# Patient Record
Sex: FEMALE | Race: WHITE | Employment: FULL TIME | URBAN - METROPOLITAN AREA
[De-identification: names, ages, dates, MRNs, and addresses within clinical notes are randomized per-mention and may not be internally consistent; named-entity substitution may affect disease eponyms.]

---

## 2017-07-19 PROBLEM — J30.89 NON-SEASONAL ALLERGIC RHINITIS: Status: ACTIVE | Noted: 2017-07-19

## 2017-08-08 ENCOUNTER — HOSPITAL ENCOUNTER (OUTPATIENT)
Dept: SURGERY | Age: 77
Discharge: HOME | End: 2017-08-08
Payer: COMMERCIAL

## 2017-08-08 ENCOUNTER — HOSPITAL ENCOUNTER (OUTPATIENT)
Dept: PHYSICAL THERAPY | Age: 77
Discharge: HOME | End: 2017-08-08
Payer: COMMERCIAL

## 2017-08-08 VITALS
RESPIRATION RATE: 16 BRPM | HEIGHT: 65 IN | SYSTOLIC BLOOD PRESSURE: 162 MMHG | OXYGEN SATURATION: 98 % | HEART RATE: 62 BPM | DIASTOLIC BLOOD PRESSURE: 90 MMHG | TEMPERATURE: 95.6 F | WEIGHT: 212.38 LBS | BODY MASS INDEX: 35.38 KG/M2

## 2017-08-08 LAB
ANION GAP BLD CALC-SCNC: 7 MMOL/L (ref 7–16)
APPEARANCE UR: ABNORMAL
APTT PPP: 28 SEC (ref 23.5–31.7)
ATRIAL RATE: 66 BPM
BACTERIA SPEC CULT: NORMAL
BACTERIA URNS QL MICRO: 0 /HPF
BASOPHILS # BLD AUTO: 0 K/UL (ref 0–0.2)
BASOPHILS # BLD: 1 % (ref 0–2)
BILIRUB UR QL: NEGATIVE
BUN SERPL-MCNC: 11 MG/DL (ref 8–23)
CALCIUM SERPL-MCNC: 9.2 MG/DL (ref 8.3–10.4)
CALCULATED P AXIS, ECG09: 71 DEGREES
CALCULATED R AXIS, ECG10: -12 DEGREES
CALCULATED T AXIS, ECG11: 74 DEGREES
CASTS URNS QL MICRO: 0 /LPF
CHLORIDE SERPL-SCNC: 105 MMOL/L (ref 98–107)
CO2 SERPL-SCNC: 30 MMOL/L (ref 21–32)
COLOR UR: YELLOW
CREAT SERPL-MCNC: 0.77 MG/DL (ref 0.6–1)
DIAGNOSIS, 93000: NORMAL
DIFFERENTIAL METHOD BLD: ABNORMAL
EOSINOPHIL # BLD: 0.3 K/UL (ref 0–0.8)
EOSINOPHIL NFR BLD: 5 % (ref 0.5–7.8)
EPI CELLS #/AREA URNS HPF: ABNORMAL /HPF
ERYTHROCYTE [DISTWIDTH] IN BLOOD BY AUTOMATED COUNT: 13.1 % (ref 11.9–14.6)
GLUCOSE SERPL-MCNC: 78 MG/DL (ref 65–100)
GLUCOSE UR STRIP.AUTO-MCNC: NEGATIVE MG/DL
HCT VFR BLD AUTO: 41.5 % (ref 35.8–46.3)
HGB BLD-MCNC: 13.8 G/DL (ref 11.7–15.4)
HGB UR QL STRIP: NEGATIVE
IMM GRANULOCYTES # BLD: 0 K/UL (ref 0–0.5)
IMM GRANULOCYTES NFR BLD AUTO: 0.2 % (ref 0–5)
INR PPP: 0.9 (ref 0.9–1.2)
KETONES UR QL STRIP.AUTO: NEGATIVE MG/DL
LEUKOCYTE ESTERASE UR QL STRIP.AUTO: ABNORMAL
LYMPHOCYTES # BLD AUTO: 39 % (ref 13–44)
LYMPHOCYTES # BLD: 2.1 K/UL (ref 0.5–4.6)
MCH RBC QN AUTO: 30.2 PG (ref 26.1–32.9)
MCHC RBC AUTO-ENTMCNC: 33.3 G/DL (ref 31.4–35)
MCV RBC AUTO: 90.8 FL (ref 79.6–97.8)
MONOCYTES # BLD: 0.4 K/UL (ref 0.1–1.3)
MONOCYTES NFR BLD AUTO: 8 % (ref 4–12)
NEUTS SEG # BLD: 2.6 K/UL (ref 1.7–8.2)
NEUTS SEG NFR BLD AUTO: 47 % (ref 43–78)
NITRITE UR QL STRIP.AUTO: NEGATIVE
P-R INTERVAL, ECG05: 162 MS
PH UR STRIP: 7 [PH] (ref 5–9)
PLATELET # BLD AUTO: 272 K/UL (ref 150–450)
PMV BLD AUTO: 10.2 FL (ref 10.8–14.1)
POTASSIUM SERPL-SCNC: 4.1 MMOL/L (ref 3.5–5.1)
PROT UR STRIP-MCNC: NEGATIVE MG/DL
PROTHROMBIN TIME: 9.8 SEC (ref 9.6–12)
Q-T INTERVAL, ECG07: 436 MS
QRS DURATION, ECG06: 82 MS
QTC CALCULATION (BEZET), ECG08: 457 MS
RBC # BLD AUTO: 4.57 M/UL (ref 4.05–5.25)
RBC #/AREA URNS HPF: ABNORMAL /HPF
SERVICE CMNT-IMP: NORMAL
SODIUM SERPL-SCNC: 142 MMOL/L (ref 136–145)
SP GR UR REFRACTOMETRY: 1 (ref 1–1.02)
UROBILINOGEN UR QL STRIP.AUTO: 0.2 EU/DL (ref 0.2–1)
VENTRICULAR RATE, ECG03: 66 BPM
WBC # BLD AUTO: 5.4 K/UL (ref 4.3–11.1)
WBC URNS QL MICRO: ABNORMAL /HPF

## 2017-08-08 PROCEDURE — 77030027138 HC INCENT SPIROMETER -A

## 2017-08-08 PROCEDURE — 85025 COMPLETE CBC W/AUTO DIFF WBC: CPT | Performed by: ORTHOPAEDIC SURGERY

## 2017-08-08 PROCEDURE — 85730 THROMBOPLASTIN TIME PARTIAL: CPT | Performed by: ORTHOPAEDIC SURGERY

## 2017-08-08 PROCEDURE — 80048 BASIC METABOLIC PNL TOTAL CA: CPT | Performed by: ORTHOPAEDIC SURGERY

## 2017-08-08 PROCEDURE — G8980 MOBILITY D/C STATUS: HCPCS

## 2017-08-08 PROCEDURE — G8979 MOBILITY GOAL STATUS: HCPCS

## 2017-08-08 PROCEDURE — 97161 PT EVAL LOW COMPLEX 20 MIN: CPT

## 2017-08-08 PROCEDURE — 81001 URINALYSIS AUTO W/SCOPE: CPT | Performed by: ORTHOPAEDIC SURGERY

## 2017-08-08 PROCEDURE — 93005 ELECTROCARDIOGRAM TRACING: CPT | Performed by: ANESTHESIOLOGY

## 2017-08-08 PROCEDURE — 85610 PROTHROMBIN TIME: CPT | Performed by: ORTHOPAEDIC SURGERY

## 2017-08-08 PROCEDURE — 87641 MR-STAPH DNA AMP PROBE: CPT | Performed by: ORTHOPAEDIC SURGERY

## 2017-08-08 PROCEDURE — G8978 MOBILITY CURRENT STATUS: HCPCS

## 2017-08-08 RX ORDER — TRAMADOL HYDROCHLORIDE 50 MG/1
50 TABLET ORAL
COMMUNITY
End: 2017-08-31

## 2017-08-08 NOTE — PROGRESS NOTES
Anmol Mtz Laniepos  : (78 y.o.) [address] at [address], [facility].  [phone]       Physical Therapy Prehab Plan of Treatment and Evaluation Summary:2017    ICD-10: Treatment Diagnosis:   · Pain in Right Hip (M25.551)  · Stiffness of Right Hip, Not elsewhere classified (M25.651)  Precautions/Allergies:   Codeine; Sulfa (sulfonamide antibiotics); and Zithromax [azithromycin]  MEDICAL/REFERRING DIAGNOSIS:  Unilateral primary osteoarthritis, right hip [M16.11]  REFERRING PHYSICIAN: Julianne Roldan MD  DATE OF SURGERY: 17   Assessment:   Comments:  She ios motivated and prepared for surgery. 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 Very sweet woman. She has had B TKAs in the past. She plans on going home at [place] and will have her family look after her at [place]. PROBLEM LIST (Impacting functional limitations):  Ms. Jac Wyatt presents with the following right lower extremity(s) problems:  1. Strength  2. Range of Motion  3. Home Exercise Program  4. Pain   INTERVENTIONS PLANNED:  1. Home Exercise Program  2. Educational Discussion     TREATMENT PLAN: Effective Dates: 2017 TO 2017. Frequency/Duration: Patient to continue to perform home exercise program at least twice per day up until her surgery. GOALS: (Goals have been discussed and agreed upon with patient.)  Discharge Goals: Time Frame: 1 Day  1. Patient will demonstrate independence with a home exercise program designed to increase strength, range of motion and pain control to minimize functional deficits and optimize patient for total joint replacement. Rehabilitation Potential For Stated Goals: Good  Regarding Dinorah A Ángel's therapy, I certify that the treatment plan above will be carried out by a therapist or under their direction.   Thank you for this referral,  Geraldine Gonsalves, PT               HISTORY:   Present Symptoms:  Pain Intensity 1: 8 (at its worst)  Pain Location 1: Buttocks, Hip, Groin, Knee  Pain Orientation 1: Anterior, Lateral, Medial, Posterior, Right   History of Present Injury/Illness (Reason for Referral):  Medical/Referring Diagnosis: Unilateral primary osteoarthritis, right hip [M16.11]   Past Medical History/Comorbidities:   Ms. Michell Smiley  has a past medical history of Asthma exacerbation and Bilateral arm fractures (2013). Ms. Michell Smiley  has a past surgical history that includes hysterectomy (1960's); knee replacement (Left, 2005); knee replacement (Right, 2006); and orthopaedic (2013). Social History/Living Environment:   Home Environment: Private residence  # Steps to Enter: 3  Hand Rails : Bilateral  One/Two Story Residence: Two story, live on 1st floor  # of Interior Steps: 8  Interior Rails: Left  Living Alone: Yes  Support Systems: Family member(s), Child(messi)  Patient Expects to be Discharged to[de-identified] Private residence  Current DME Used/Available at Home: Mayra Camacho, ajmee, [address] [facility], [place], straight, Shower chair, Commode, bedside  Tub or Shower Type: Tub  Work/Activity:  Works at [employer] in [address] [place] [address]. Does some sitting and walking.   Dominant Side:  RIGHT  Current Medications:  See Pre-assessment nursing note   Number of Personal Factors/Comorbidities that affect the Plan of Care: 0: LOW COMPLEXITY   EXAMINATION:   ADLs (Current Functional Status):   Ambulation:  [x] Independent  [] Walk Indoors Only  [x] Walk Outdoors  [x] Use Assistive Device  [] Use Wheelchair Only Dressing:  [x] Independent  Requires Assistance from Someone for:  [] Sock/Shoes  [] Pants  [] Everything   Bathing/Showering:   [x] Independent  [] Requires Assistance from Someone  [] Sponge Bath Only Household Activities:  [x] Routine house and yard work  [] Light Housework Only  [] None   Observation/Orthostatic Postural Assessment:    Leg length discrepancy, left (L LE 1/4\" shorter than R)  ROM/Flexibility:   AROM: Within functional limits (L LE)                       RLE AROM  R Hip Flexion: 110  R Hip ABduction: 20   Strength:   Strength: Generally decreased, functional (L LE 4/5)              RLE Strength  R Hip Flexion: 3+  R Hip ABduction: 3+  R Knee Extension: 4  R Ankle Dorsiflexion: 4   Functional Mobility:    Sensation: Intact (L LE)    Stand to Sit: Independent  Sit to Stand: Independent  Stand Pivot Transfers: Independent  Distance (ft): 1000 Feet (ft)  Ambulation - Level of Assistance: Independent  Speed/Robyn: Pace decreased (<100 feet/min)  Gait Abnormalities: Antalgic          Balance:    Sitting: Intact  Standing: Intact   Body Structures Involved:  1. Bones  2. Joints  3. Muscles Body Functions Affected:  1. Neuromusculoskeletal  2. Movement Related Activities and Participation Affected:  1. Mobility   Number of elements that affect the Plan of Care: 4+: HIGH COMPLEXITY   CLINICAL PRESENTATION:   Presentation: Stable and uncomplicated: LOW COMPLEXITY   CLINICAL DECISION MAKING:   Outcome Measure: Tool Used: Lower Extremity Functional Scale (LEFS)  Score:  Initial: 60/80 Most Recent: X/80 (Date: -- )   Interpretation of Score: 20 questions each scored on a 5 point scale with 0 representing \"extreme difficulty or unable to perform\" and 4 representing \"no difficulty\". The lower the score, the greater the functional disability. 80/80 represents no disability. Minimal detectable change is 9 points. Score 80 79-65 64-49 48-33 32-17 16-1 0   Modifier CH CI CJ CK CL CM CN     ? Mobility - Walking and Moving Around:     - CURRENT STATUS: CJ - 20%-39% impaired, limited or restricted    - GOAL STATUS: CJ - 20%-39% impaired, limited or restricted    - D/C STATUS:  CJ - 20%-39% impaired, limited or restricted    Medical Necessity:   · Ms. Neal is expected to optimize her lower extremity strength and ROM in preparation for joint replacement surgery.   Reason for Services/Other Comments:  · Achieve baseline assesment of musculoskeletal system, functional mobility and home environment. , educate in PT HEP in preparation for surgery, educate in hospital plan of care. Use of outcome tool(s) and clinical judgement create a POC that gives a: Clear prediction of patient's progress: LOW COMPLEXITY   TREATMENT:   Treatment/Session Assessment:  Patient was instructed in PT- HEP to increase strength and ROM in LEs. Answered all questions. · Post session pain:  1  · Compliance with Program/Exercises: compliant all of the time.   Total Treatment Duration:PT Patient Time In/Time Out  Time In: 3330  Time Out: [address], [place]

## 2017-08-08 NOTE — PERIOP NOTES
Patient verified name, , and surgery as listed in [facility]. Type III surgery, walk in assessment complete. Labs per surgeon: cbc,bmp,PT/PTT and UA ; results pending  Labs per anesthesia protocol: type & screen DOS  EKG:performed per anesthesia protocol, results within limits. Copy placed on chart. Pt with history of asthma, followed by [facility]. Last office visit 17. Per NP note:    Spirometry 2017moderate restrictive defect, no significant interval change. Scooping of expiratory loop consistent with obstructive pattern  She is felt to be at low risk for pulmonary complications associated with surgery. ..regarding CPAP therapy, may need in the immediate postoperative. And on a nightly basis during hospitalization. Discussed that if she reconsiders uses CPAP long-term, will likely need to have sleep study/CPAP titration. Continue Advair, as needed albuterol inhaler/nebulizer, Accolate twice daily. Hibiclens and instructions to return bottle on DOS given per hospital policy. Nasal Swab collected per MD order and instructions for Mupirocin nasal ointment if required. Patient provided with handouts including Guide to Surgery, Pain Management, Hand Hygiene, Blood Transfusion Education, and [place] [facility] Brochure. Patient answered medical/surgical history questions at their best of ability. All prior to admission medications documented in [facility]. Original medication prescription bottle  visualized during patient appointment. Patient instructed to hold all vitamins 7 days prior to surgery and NSAIDS 5 days prior to surgery. Medications to be held: none    Patient instructed to continue previous medications as prescribed prior to surgery and to take the following medications the day of surgery according to anesthesia guidelines with a small sip of water: zafirlukast and nexium.  Also instructed to use advair inhaler and to bring Advair and ProAir inhalers to hospital.      Patient teach back successful and patient demonstrates knowledge of instruction.

## 2017-08-08 NOTE — PROGRESS NOTES
08/08/17 1030   Oxygen Therapy   O2 Sat (%) 98 %   Pulse via Oximetry 87 beats per minute   O2 Device Room air   Pre-Treatment   Breath Sounds Bilateral Clear;Diminished   Pre FEV1 (liters) 1.2 liters   % Predicted 56   Incentive Spirometry Treatment   Actual Volume (ml) 1500 ml     Initial respiratory Assessment completed with pt. Pt was interviewed and evaluated in Joint camp prior to surgery. Patient ID:  Bety Sal  [id number]  36 y.o.  1940  Surgeon: Dr. Patricia Bronson  Date of Surgery: 8/29/2017  Procedure: Total Right Hip Arthroplasty  Primary Care Physician: Angela Fagan [phone]  Specialists: [facility] - LAST APPOINTMENT 7/19/2017                                 Pt instructed in the use of Incentive Spirometry. Pt instructed to bring Incentive Spirometer back on date of surgery & to start using Is upon return to pt room. Pt taught proper cough technique      History of smoking:   FORMER? 1 CIGARETTE PER WEEK FOR 6 MONTHS     Quit date:    Secondhand smoke:      Past procedures with Oxygen desaturation:NONE    Past Medical History:   Diagnosis Date    Arthritis     Asthma exacerbation     hospitalization 2004    Bilateral arm fractures 2013    GERD (gastroesophageal reflux disease)     Hiatal hernia     Morbid obesity ([address]                                     ENT:SINUS                                                                                                                      Respiratory history:ASTHMA                                    DENIES SOB                                  Respiratory meds:  ALBUTEROL NEB PRN,ADVAIR BID                                                        FAMILY PRESENT:                      NO                                        PAST SLEEP STUDY:        YES          HX OF LENNY:                        YES       POSITIVE FOR LENNY- GAVE CPAP TO HER SON.   DANGERS OF NON-COMPLIANCE EXPLAINED TO PT. PT STATES [facility] TOLD HER THE SAME. PT AGREEABLE AT THIS TIME FOR CONSULT TO SLEEP CENTER TO FOLLOW UP                                  LENNY assessment:                                                                                              PHYSICAL EXAM   Body mass index is 35.34 kg/(m^2). Visit Vitals    /90 (BP 1 Location: Left arm, BP Patient Position: Sitting)    Pulse 62    Temp 95.6 °F (35.3 °C)    Resp 16    Ht 5' 5\" (1.651 m)    Wt 96.3 kg (212 lb 6 oz)    SpO2 (P) 98%    BMI 35.34 kg/m2     Neck circumference:  40    cm    Loud snoring:YES       Witnessed apnea or wakening gasping or choking: , APNEA    Awakens with headaches:DENIES    Morning or daytime tiredness/ sleepiness: DENIES       Dry mouth or sore throat in morning:YES   Freidman stage:4    SACS score:22    STOP/BAN                              CPAP:NONE        ALBUTEROL NEB Q6 PRN          SPACER       CONT SAT HS       O2 AT 3 LPM HS     Referrals:SLEEP CENTER CONSULTATION    Pt.  Phone AVWVQY:[phone]

## 2017-08-08 NOTE — PERIOP NOTES
Lab results within limits per anesthesia protocol; OK for surgery. Recent Results (from the past 12 hour(s))   CBC WITH AUTOMATED DIFF    Collection Time: 08/08/17 10:55 AM   Result Value Ref Range    WBC 5.4 4.3 - 11.1 K/uL    RBC 4.57 4.05 - 5.25 M/uL    HGB 13.8 11.7 - 15.4 g/dL    HCT 41.5 35.8 - 46.3 %    MCV 90.8 79.6 - 97.8 FL    MCH 30.2 26.1 - 32.9 PG    MCHC 33.3 31.4 - 35.0 g/dL    RDW 13.1 11.9 - 14.6 %    PLATELET 670 892 - 972 K/uL    MPV 10.2 (L) 10.8 - 14.1 FL    DF AUTOMATED      NEUTROPHILS 47 43 - 78 %    LYMPHOCYTES 39 13 - 44 %    MONOCYTES 8 4.0 - 12.0 %    EOSINOPHILS 5 0.5 - 7.8 %    BASOPHILS 1 0.0 - 2.0 %    IMMATURE GRANULOCYTES 0.2 0.0 - 5.0 %    ABS. NEUTROPHILS 2.6 1.7 - 8.2 K/UL    ABS. LYMPHOCYTES 2.1 0.5 - 4.6 K/UL    ABS. MONOCYTES 0.4 0.1 - 1.3 K/UL    ABS. EOSINOPHILS 0.3 0.0 - 0.8 K/UL    ABS. BASOPHILS 0.0 0.0 - 0.2 K/UL    ABS. IMM.  GRANS. 0.0 0.0 - 0.5 K/UL   METABOLIC PANEL, BASIC    Collection Time: 08/08/17 10:55 AM   Result Value Ref Range    Sodium 142 136 - 145 mmol/L    Potassium 4.1 3.5 - 5.1 mmol/L    Chloride 105 98 - 107 mmol/L    CO2 30 21 - 32 mmol/L    Anion gap 7 7 - 16 mmol/L    Glucose 78 65 - 100 mg/dL    BUN 11 8 - 23 MG/DL    Creatinine 0.77 0.6 - 1.0 MG/DL    GFR est AA >60 >60 ml/min/1.73m2    GFR est non-AA >60 >60 ml/min/1.73m2    Calcium 9.2 8.3 - 10.4 MG/DL   PROTHROMBIN TIME + INR    Collection Time: 08/08/17 10:55 AM   Result Value Ref Range    Prothrombin time 9.8 9.6 - 12.0 sec    INR 0.9 0.9 - 1.2     PTT    Collection Time: 08/08/17 10:55 AM   Result Value Ref Range    aPTT 28.0 23.5 - 31.7 SEC   URINALYSIS W/ RFLX MICROSCOPIC    Collection Time: 08/08/17 10:55 AM   Result Value Ref Range    Color YELLOW      Appearance CLOUDY      Specific gravity 1.005 1.001 - 1.023      pH (UA) 7.0 5.0 - 9.0      Protein NEGATIVE  NEG mg/dL    Glucose NEGATIVE  mg/dL    Ketone NEGATIVE  NEG mg/dL    Bilirubin NEGATIVE  NEG      Blood NEGATIVE  NEG Urobilinogen 0.2 0.2 - 1.0 EU/dL    Nitrites NEGATIVE  NEG      Leukocyte Esterase SMALL (A) NEG      WBC 0-3 0 /hpf    RBC 0-3 0 /hpf    Epithelial cells 0-3 0 /hpf    Bacteria 0 0 /hpf    Casts 0 0 /lpf

## 2017-08-14 PROBLEM — Z91.14 NONCOMPLIANCE WITH CPAP TREATMENT: Status: ACTIVE | Noted: 2017-08-14

## 2017-08-14 NOTE — ADVANCED PRACTICE NURSE
Preoperative Assessment Total Joint Replacement    Patient ID:  Lakia Angeles  [id number]  69 y.o.  1940  Surgeon: Dr. Jayashree Rosas  Date of Surgery: 8/29/2017  Procedure: Total Right Hip Arthroplasty  Primary Care Physician: Christine Aquino -[phone]      SUBJECTIVE:  Lakia Angeles is a 78 y.o. WHITE [place]  female who presents for preoperative evaluation for Total Right Hip arthroplasty. Past Medical History:   Diagnosis Date    Arthritis     Asthma exacerbation     hospitalization 2004    Bilateral arm fractures 2013    GERD (gastroesophageal reflux disease)     Hiatal hernia     Morbid obesity ([address]         Past Surgical History:   Procedure Laterality Date    HX HYSTERECTOMY  1960's    HX KNEE REPLACEMENT Left 2005    HX KNEE REPLACEMENT Right 2006    HX ORTHOPAEDIC  2013    arm     Family History   Problem Relation Age of Onset    Lung Disease Neg Hx       Social History   Substance Use Topics    Smoking status: Never Smoker    Smokeless tobacco: Never Used    Alcohol use No       Prior to Admission medications    Medication Sig Start Date End Date Taking? Authorizing Provider   traMADol (ULTRAM) 50 mg tablet Take 50 mg by mouth every six (6) hours as needed for Pain. Yes Historical Provider   fluticasone-salmeterol (ADVAIR DISKUS) 250-50 mcg/dose diskus inhaler 1 inhalation bid, rinse mouth after use 7/19/17  Yes Kassie Thompson NP   albuterol (PROAIR HFA) 90 mcg/actuation inhaler 2 puffs qid prn 7/19/17  Yes Kassie Thompson NP   zafirlukast (ACCOLATE) 20 mg tablet 1 po bid 7/19/17  Yes Kassie Thompson NP   esomeprazole (NEXIUM) 20 mg capsule Take 20 mg by mouth daily.    Yes Historical Provider   albuterol (PROVENTIL VENTOLIN) 2.5 mg /3 mL (0.083 %) nebulizer solution 1 vial via nebulizer qid prn    Dx - asthma J45.909 7/19/17   Kassie Thompson NP   magic mouthwash solution 1 tsp swish and swallow tid 7/19/17   Kassie Thompson NP   OTHER Handicap Decal  Dx: Asthma J45.40 5/19/16   Fady Alvares MD     Allergies   Allergen Reactions    Codeine Unknown (comments)    Sulfa (Sulfonamide Antibiotics) Unknown (comments)    Zithromax [Azithromycin] Unknown (comments)        REVIEW OF SYSTEMS:  CONSTITUTIONAL:  Denies fever, decreased appetite, weight loss/gain or night sweats. Positive for fatigue. HEENT: Denies vision or hearing changes. CARDIAC: Denies CP, palpitations, carotid artery disease or syncopal episodes. RESPIRATORY: Denies dyspnea on exertion. Positive for obesity and snoring. Falls asleep during the day. GI: Positive GERD. Denies history of GI bleed or melena, PUD. : Denies dysuria, hematuria. Bela Eves HEMATOLOGIC: Denies anemia or blood disorders. ENDOCRINE: Denies thyroid disease. MUSCULOSKELETAL: See HPI. NEUROLOGIC: Denies seizure, peripheral neuropathy or memory loss. PSYCH: Denies depression, anxiety or  insomnia. SKIN: Denies rash or open sores. OBJECTIVE:  PHYSICAL EXAM   Body mass index is 35.34 kg/(m^2). Visit Vitals    /90 (BP 1 Location: Left arm, BP Patient Position: Sitting)    Pulse 62    Temp 95.6 °F (35.3 °C)    Resp 16    Ht 5' 5\" (1.651 m)    Wt 96.3 kg (212 lb 6 oz)    SpO2 98%    BMI 35.34 kg/m2     GENERAL  EYES: Normal Conjunctivae . NECK: Normal ROM. Trachea midline. CARDIOVASCULAR: Regular rate and rhythm. No murmur or gallops. No Lower extremity edema. LUNGS: CTA bilaterally. No wheezes, rhonchi or rales. GI: Abdomen nontender. NEUROLOGIC: Alert and oriented x 3. SKIN: No rash, bruising, swelling, redness or warmth.    Labs:     Reviewed    Problem List:  Patient Active Problem List   Diagnosis Code    Asthma J45.909    LENNY (obstructive sleep apnea) G47.33    Obesity E66.9    Allergic rhinitis J30.9    GERD (gastroesophageal reflux disease) K21.9    Moderate persistent asthma without complication T24.15    Non-seasonal allergic rhinitis J30.89    Noncompliance with CPAP treatment Z91.14     Discussed the importance of compliance with CPAP. We will refer her  to the sleep lab for consultation. Total Joint Surgery Pre-Assessment Recommendations: The patient is not compliant with wearing CPAP. Recommend oxygen saturation monitoring during hospitalization and oxygen at 3 liter via nc qhs.       Signed By: SANGEETA Rosenthal    August 14, 2017

## 2017-08-25 NOTE — H&P
H&P    Patient ID:  Alma Granados  [id number]  77 y.o.  1940  Surgeon:  Connie Gambino MD  Date of Surgery: * No surgery date entered *  Procedure: Right Hip Total Arthroplasty  Primary Care Physician: Cassie Ferrari MD        Subjective:  Alma Granados is a 78 y.o. WHITE OR  female who presents with Right Hip pain. She has history of Right Hip pain for several months ago. Symptoms worse with walking and relieved with rest. Conservative treatment consisting of  activity modification has not helped. The patient  lives with their spouse. The patients goal after surgery is improved pain and function. Past Medical History:   Diagnosis Date    Arthritis     Asthma exacerbation     hospitalization 2004    Bilateral arm fractures 2013    GERD (gastroesophageal reflux disease)     Hiatal hernia     Morbid obesity ([address]       Past Surgical History:   Procedure Laterality Date    HX HYSTERECTOMY  1960's    HX KNEE REPLACEMENT Left 2005    HX KNEE REPLACEMENT Right 2006    HX ORTHOPAEDIC  2013    arm     Family History   Problem Relation Age of Onset    Lung Disease Neg Hx       Social History   Substance Use Topics    Smoking status: Never Smoker    Smokeless tobacco: Never Used    Alcohol use No       Prior to Admission medications    Medication Sig Start Date End Date Taking? Authorizing Provider   traMADol (ULTRAM) 50 mg tablet Take 50 mg by mouth every six (6) hours as needed for Pain.     Historical Provider   albuterol (PROVENTIL VENTOLIN) 2.5 mg /3 mL (0.083 %) nebulizer solution 1 vial via nebulizer qid prn    Dx - asthma J45.909 7/19/17   Vinnie García NP   magic mouthwash solution 1 tsp swish and swallow tid 7/19/17   Vinnie García NP   fluticasone-salmeterol (ADVAIR DISKUS) 250-50 mcg/dose diskus inhaler 1 inhalation bid, rinse mouth after use 7/19/17   Vinnie García NP   albuterol (PROAIR HFA) 90 mcg/actuation inhaler 2 puffs qid prn 7/19/17   Riddhi Spoon Mino Tijerina NP   zafirlukast (ACCOLATE) 20 mg tablet 1 po bid 7/19/17   Ady Garcia NP   OTHER Handicap Decal  Dx: Asthma J45.40 5/19/16   Maria Isabel Melendez MD   esomeprazole (NEXIUM) 20 mg capsule Take 20 mg by mouth daily. Historical Provider     Allergies   Allergen Reactions    Codeine Unknown (comments)    Sulfa (Sulfonamide Antibiotics) Unknown (comments)    Zithromax [Azithromycin] Unknown (comments)        REVIEW OF SYSTEMS:  CONSTITUTIONAL: Denies fever, decreased appetite, weight loss/gain, night sweats or fatigue. HEENT: Denies vision or hearing changes. denies glasses. denies hearing aids. CARDIAC: Denies CP, palpitations, rheumatic fever, murmur, peripheral edema, carotid artery disease or syncopal episodes. RESPIRATORY: Denies dyspnea on exertion, asthma, COPD or orthopnea. GI: Denies GERD, history of GI bleed or melena, PUD, hepatitis or cirrhosis. : Denies dysuria, hematuria. denies BPH symptoms. HEMATOLOGIC: Denies anemia or blood disorders. ENDOCRINE: Denies thyroid disease. MUSCULOSKELETAL: See HPI. NEUROLOGIC: Denies seizure, peripheral neuropathy or memory loss. PSYCH: Denies depression, anxiety or insomnia. SKIN: Denies rash or open sores. Objective:    PHYSICAL EXAM  GENERAL: No data found. EYES: PERRL. EOM intact. MOUTH:Teeth and Gums normal. NECK: Full ROM. Trachea midline. No thyromegaly or JVD. CARDIOVASCULAR: Regular rate and rhythm. No murmur or gallops. No carotid bruits. Peripheral pulses: radial 2 +, PT 2+, DP 2+ bilaterally. LUNGS: CTA bilaterally. No wheezes, rhonchi or rales. GI: positive BS. Abdomen nontender. NEUROLOGIC: Alert and oriented x 3. Bilateral equal strong had grasp and bilateral equal strong plantar flexion and dorsiflexion. GAIT: abnormal  MUSCULOSKELETAL: ROM: full range of motion. Tenderness: None. Crepitus: not present. SKIN: No rash, bruising, swelling, redness or warmth.      Labs:  No results found for this or any previous visit (from the past 24 hour(s)). Xray Right Hip: Joint space narrowing    Assessment:  Advanced Right Hip Osteoarthritis. Total Right Hip Arthroplasty Indicated. Patient Active Problem List   Diagnosis Code    Asthma J45.909    LENNY (obstructive sleep apnea) G47.33    Obesity E66.9    Allergic rhinitis J30.9    GERD (gastroesophageal reflux disease) K21.9    Moderate persistent asthma without complication K27.03    Non-seasonal allergic rhinitis J30.89    Noncompliance with CPAP treatment Z91.14       Plan:  I have advised the patient of the risks and consequences, including possible complications of performing total joint replacement, as well as not doing this operation. The patient had the opportunity to ask questions and have them answered to their satisfaction.      Signed:  YOHANNES More 8/25/2017

## 2017-08-28 ENCOUNTER — ANESTHESIA EVENT (OUTPATIENT)
Dept: SURGERY | Age: 77
DRG: 470 | End: 2017-08-28
Payer: COMMERCIAL

## 2017-08-29 ENCOUNTER — HOME HEALTH ADMISSION (OUTPATIENT)
Dept: HOME HEALTH SERVICES | Facility: HOME HEALTH | Age: 77
End: 2017-08-29
Payer: COMMERCIAL

## 2017-08-29 ENCOUNTER — ANESTHESIA (OUTPATIENT)
Dept: SURGERY | Age: 77
DRG: 470 | End: 2017-08-29
Payer: COMMERCIAL

## 2017-08-29 ENCOUNTER — HOSPITAL ENCOUNTER (INPATIENT)
Age: 77
LOS: 2 days | Discharge: HOME HEALTH | DRG: 470 | End: 2017-08-31
Attending: ORTHOPAEDIC SURGERY | Admitting: ORTHOPAEDIC SURGERY
Payer: COMMERCIAL

## 2017-08-29 ENCOUNTER — APPOINTMENT (OUTPATIENT)
Dept: GENERAL RADIOLOGY | Age: 77
DRG: 470 | End: 2017-08-29
Attending: ORTHOPAEDIC SURGERY
Payer: COMMERCIAL

## 2017-08-29 PROBLEM — M19.90 OSTEOARTHRITIS: Status: ACTIVE | Noted: 2017-08-29

## 2017-08-29 LAB
ABO + RH BLD: NORMAL
BLOOD GROUP ANTIBODIES SERPL: NORMAL
GLUCOSE BLD STRIP.AUTO-MCNC: 96 MG/DL (ref 65–100)
HGB BLD-MCNC: 13.2 G/DL (ref 11.7–15.4)
SPECIMEN EXP DATE BLD: NORMAL

## 2017-08-29 PROCEDURE — 72170 X-RAY EXAM OF PELVIS: CPT

## 2017-08-29 PROCEDURE — 77030011640 HC PAD GRND REM COVD -A: Performed by: ORTHOPAEDIC SURGERY

## 2017-08-29 PROCEDURE — 77030007880 HC KT SPN EPDRL BBMI -B: Performed by: ANESTHESIOLOGY

## 2017-08-29 PROCEDURE — 77030018547 HC SUT ETHBND1 J&J -B: Performed by: ORTHOPAEDIC SURGERY

## 2017-08-29 PROCEDURE — 77030018836 HC SOL IRR NACL ICUM -A: Performed by: ORTHOPAEDIC SURGERY

## 2017-08-29 PROCEDURE — 77030020788: Performed by: ORTHOPAEDIC SURGERY

## 2017-08-29 PROCEDURE — 94762 N-INVAS EAR/PLS OXIMTRY CONT: CPT

## 2017-08-29 PROCEDURE — 77010033678 HC OXYGEN DAILY

## 2017-08-29 PROCEDURE — 74011250636 HC RX REV CODE- 250/636: Performed by: ANESTHESIOLOGY

## 2017-08-29 PROCEDURE — 74011250637 HC RX REV CODE- 250/637: Performed by: ANESTHESIOLOGY

## 2017-08-29 PROCEDURE — 77030032490 HC SLV COMPR SCD KNE COVD -B

## 2017-08-29 PROCEDURE — 82962 GLUCOSE BLOOD TEST: CPT

## 2017-08-29 PROCEDURE — 85018 HEMOGLOBIN: CPT | Performed by: ORTHOPAEDIC SURGERY

## 2017-08-29 PROCEDURE — 74011250636 HC RX REV CODE- 250/636

## 2017-08-29 PROCEDURE — C1776 JOINT DEVICE (IMPLANTABLE): HCPCS | Performed by: ORTHOPAEDIC SURGERY

## 2017-08-29 PROCEDURE — 74011000250 HC RX REV CODE- 250: Performed by: ANESTHESIOLOGY

## 2017-08-29 PROCEDURE — 74011250636 HC RX REV CODE- 250/636: Performed by: ORTHOPAEDIC SURGERY

## 2017-08-29 PROCEDURE — 74011000250 HC RX REV CODE- 250: Performed by: ORTHOPAEDIC SURGERY

## 2017-08-29 PROCEDURE — 77030002966 HC SUT PDS J&J -A: Performed by: ORTHOPAEDIC SURGERY

## 2017-08-29 PROCEDURE — 77030012890

## 2017-08-29 PROCEDURE — 77030003665 HC NDL SPN BBMI -A: Performed by: ANESTHESIOLOGY

## 2017-08-29 PROCEDURE — 65270000029 HC RM PRIVATE

## 2017-08-29 PROCEDURE — 77030006777 HC BLD SAW OSC CNMD -B: Performed by: ORTHOPAEDIC SURGERY

## 2017-08-29 PROCEDURE — 77030003666 HC NDL SPINAL BD -A: Performed by: ORTHOPAEDIC SURGERY

## 2017-08-29 PROCEDURE — 77030035236 HC SUT PDS STRATFX BARB J&J -B: Performed by: ORTHOPAEDIC SURGERY

## 2017-08-29 PROCEDURE — 94760 N-INVAS EAR/PLS OXIMETRY 1: CPT

## 2017-08-29 PROCEDURE — 74011250637 HC RX REV CODE- 250/637: Performed by: ORTHOPAEDIC SURGERY

## 2017-08-29 PROCEDURE — 0SR901A REPLACEMENT OF RIGHT HIP JOINT WITH METAL SYNTHETIC SUBSTITUTE, UNCEMENTED, OPEN APPROACH: ICD-10-PCS | Performed by: ORTHOPAEDIC SURGERY

## 2017-08-29 PROCEDURE — 77030013727 HC IRR FAN PULSVC ZIMM -B: Performed by: ORTHOPAEDIC SURGERY

## 2017-08-29 PROCEDURE — 74011000250 HC RX REV CODE- 250

## 2017-08-29 PROCEDURE — 76010000162 HC OR TIME 1.5 TO 2 HR INTENSV-TIER 1: Performed by: ORTHOPAEDIC SURGERY

## 2017-08-29 PROCEDURE — 74011000258 HC RX REV CODE- 258: Performed by: ORTHOPAEDIC SURGERY

## 2017-08-29 PROCEDURE — 77030018074 HC RTVR SUT ARTH4 S&N -B: Performed by: ORTHOPAEDIC SURGERY

## 2017-08-29 PROCEDURE — 77030018846 HC SOL IRR STRL H20 ICUM -A: Performed by: ORTHOPAEDIC SURGERY

## 2017-08-29 PROCEDURE — 77030020782 HC GWN BAIR PAWS FLX 3M -B: Performed by: ANESTHESIOLOGY

## 2017-08-29 PROCEDURE — 77030031139 HC SUT VCRL2 J&J -A: Performed by: ORTHOPAEDIC SURGERY

## 2017-08-29 PROCEDURE — 77030034849: Performed by: ORTHOPAEDIC SURGERY

## 2017-08-29 PROCEDURE — 77030008467 HC STPLR SKN COVD -B: Performed by: ORTHOPAEDIC SURGERY

## 2017-08-29 PROCEDURE — 76210000017 HC OR PH I REC 1.5 TO 2 HR: Performed by: ORTHOPAEDIC SURGERY

## 2017-08-29 PROCEDURE — 86900 BLOOD TYPING SEROLOGIC ABO: CPT | Performed by: ORTHOPAEDIC SURGERY

## 2017-08-29 PROCEDURE — 36415 COLL VENOUS BLD VENIPUNCTURE: CPT | Performed by: ORTHOPAEDIC SURGERY

## 2017-08-29 PROCEDURE — 77030012935 HC DRSG AQUACEL BMS -B: Performed by: ORTHOPAEDIC SURGERY

## 2017-08-29 PROCEDURE — 76060000034 HC ANESTHESIA 1.5 TO 2 HR: Performed by: ORTHOPAEDIC SURGERY

## 2017-08-29 DEVICE — STEM FEMORAL SUMMIT: Type: IMPLANTABLE DEVICE | Site: HIP | Status: FUNCTIONAL

## 2017-08-29 DEVICE — SHELL ACET DIA54MM HIP TI GRIPTION PRI SLD LOK RNG WIHOUT H: Type: IMPLANTABLE DEVICE | Site: HIP | Status: FUNCTIONAL

## 2017-08-29 DEVICE — LINER ACET OD54MM ID36MM HIP ALTRX PINN: Type: IMPLANTABLE DEVICE | Site: HIP | Status: FUNCTIONAL

## 2017-08-29 DEVICE — ELIMINATOR H APEX FOR 48-60MM PINN HIP SHELL: Type: IMPLANTABLE DEVICE | Site: HIP | Status: FUNCTIONAL

## 2017-08-29 DEVICE — HEAD FEM DIA36MM +5MM OFFSET 12/14 TAPR HIP CERAMIC BIOLOX: Type: IMPLANTABLE DEVICE | Site: HIP | Status: FUNCTIONAL

## 2017-08-29 RX ORDER — ZOLPIDEM TARTRATE 5 MG/1
5 TABLET ORAL
Status: DISCONTINUED | OUTPATIENT
Start: 2017-08-29 | End: 2017-08-31 | Stop reason: HOSPADM

## 2017-08-29 RX ORDER — ALBUTEROL SULFATE 0.83 MG/ML
2.5 SOLUTION RESPIRATORY (INHALATION)
Status: DISCONTINUED | OUTPATIENT
Start: 2017-08-29 | End: 2017-08-29 | Stop reason: SDUPTHER

## 2017-08-29 RX ORDER — SODIUM CHLORIDE, SODIUM LACTATE, POTASSIUM CHLORIDE, CALCIUM CHLORIDE 600; 310; 30; 20 MG/100ML; MG/100ML; MG/100ML; MG/100ML
75 INJECTION, SOLUTION INTRAVENOUS CONTINUOUS
Status: DISCONTINUED | OUTPATIENT
Start: 2017-08-29 | End: 2017-08-29 | Stop reason: HOSPADM

## 2017-08-29 RX ORDER — DEXAMETHASONE SODIUM PHOSPHATE 4 MG/ML
INJECTION, SOLUTION INTRA-ARTICULAR; INTRALESIONAL; INTRAMUSCULAR; INTRAVENOUS; SOFT TISSUE AS NEEDED
Status: DISCONTINUED | OUTPATIENT
Start: 2017-08-29 | End: 2017-08-29 | Stop reason: HOSPADM

## 2017-08-29 RX ORDER — SODIUM CHLORIDE 0.9 % (FLUSH) 0.9 %
5-10 SYRINGE (ML) INJECTION AS NEEDED
Status: DISCONTINUED | OUTPATIENT
Start: 2017-08-29 | End: 2017-08-31 | Stop reason: HOSPADM

## 2017-08-29 RX ORDER — DEXAMETHASONE SODIUM PHOSPHATE 100 MG/10ML
10 INJECTION INTRAMUSCULAR; INTRAVENOUS ONCE
Status: ACTIVE | OUTPATIENT
Start: 2017-08-30 | End: 2017-08-31

## 2017-08-29 RX ORDER — ACETAMINOPHEN 10 MG/ML
1000 INJECTION, SOLUTION INTRAVENOUS ONCE
Status: DISCONTINUED | OUTPATIENT
Start: 2017-08-29 | End: 2017-08-29 | Stop reason: ALTCHOICE

## 2017-08-29 RX ORDER — MIDAZOLAM HYDROCHLORIDE 1 MG/ML
2 INJECTION, SOLUTION INTRAMUSCULAR; INTRAVENOUS ONCE
Status: DISCONTINUED | OUTPATIENT
Start: 2017-08-29 | End: 2017-08-29 | Stop reason: HOSPADM

## 2017-08-29 RX ORDER — HYDRALAZINE HYDROCHLORIDE 20 MG/ML
10 INJECTION INTRAMUSCULAR; INTRAVENOUS
Status: DISCONTINUED | OUTPATIENT
Start: 2017-08-29 | End: 2017-08-31 | Stop reason: HOSPADM

## 2017-08-29 RX ORDER — FAMOTIDINE 20 MG/1
20 TABLET, FILM COATED ORAL ONCE
Status: COMPLETED | OUTPATIENT
Start: 2017-08-29 | End: 2017-08-29

## 2017-08-29 RX ORDER — FENTANYL CITRATE 50 UG/ML
100 INJECTION, SOLUTION INTRAMUSCULAR; INTRAVENOUS ONCE
Status: DISCONTINUED | OUTPATIENT
Start: 2017-08-29 | End: 2017-08-29 | Stop reason: HOSPADM

## 2017-08-29 RX ORDER — OXYCODONE AND ACETAMINOPHEN 5; 325 MG/1; MG/1
1 TABLET ORAL AS NEEDED
Status: DISCONTINUED | OUTPATIENT
Start: 2017-08-29 | End: 2017-08-29 | Stop reason: HOSPADM

## 2017-08-29 RX ORDER — PROMETHAZINE HYDROCHLORIDE 25 MG/1
25 TABLET ORAL
Status: DISCONTINUED | OUTPATIENT
Start: 2017-08-29 | End: 2017-08-31 | Stop reason: HOSPADM

## 2017-08-29 RX ORDER — MIDAZOLAM HYDROCHLORIDE 1 MG/ML
INJECTION, SOLUTION INTRAMUSCULAR; INTRAVENOUS AS NEEDED
Status: DISCONTINUED | OUTPATIENT
Start: 2017-08-29 | End: 2017-08-29 | Stop reason: HOSPADM

## 2017-08-29 RX ORDER — DIPHENHYDRAMINE HYDROCHLORIDE 50 MG/ML
INJECTION, SOLUTION INTRAMUSCULAR; INTRAVENOUS AS NEEDED
Status: DISCONTINUED | OUTPATIENT
Start: 2017-08-29 | End: 2017-08-29 | Stop reason: HOSPADM

## 2017-08-29 RX ORDER — SODIUM CHLORIDE 9 MG/ML
100 INJECTION, SOLUTION INTRAVENOUS CONTINUOUS
Status: DISPENSED | OUTPATIENT
Start: 2017-08-29 | End: 2017-08-31

## 2017-08-29 RX ORDER — HYDROMORPHONE HYDROCHLORIDE 1 MG/ML
1 INJECTION, SOLUTION INTRAMUSCULAR; INTRAVENOUS; SUBCUTANEOUS
Status: DISCONTINUED | OUTPATIENT
Start: 2017-08-29 | End: 2017-08-31 | Stop reason: HOSPADM

## 2017-08-29 RX ORDER — CEFAZOLIN SODIUM IN 0.9 % NACL 2 G/50 ML
2 INTRAVENOUS SOLUTION, PIGGYBACK (ML) INTRAVENOUS ONCE
Status: COMPLETED | OUTPATIENT
Start: 2017-08-29 | End: 2017-08-29

## 2017-08-29 RX ORDER — SODIUM CHLORIDE 0.9 % (FLUSH) 0.9 %
5-10 SYRINGE (ML) INJECTION EVERY 8 HOURS
Status: DISCONTINUED | OUTPATIENT
Start: 2017-08-29 | End: 2017-08-31 | Stop reason: HOSPADM

## 2017-08-29 RX ORDER — NALOXONE HYDROCHLORIDE 0.4 MG/ML
0.2 INJECTION, SOLUTION INTRAMUSCULAR; INTRAVENOUS; SUBCUTANEOUS AS NEEDED
Status: DISCONTINUED | OUTPATIENT
Start: 2017-08-29 | End: 2017-08-29 | Stop reason: HOSPADM

## 2017-08-29 RX ORDER — ONDANSETRON 8 MG/1
8 TABLET, ORALLY DISINTEGRATING ORAL
Status: DISCONTINUED | OUTPATIENT
Start: 2017-08-29 | End: 2017-08-31 | Stop reason: HOSPADM

## 2017-08-29 RX ORDER — ALBUTEROL SULFATE 0.83 MG/ML
2.5 SOLUTION RESPIRATORY (INHALATION)
Status: DISCONTINUED | OUTPATIENT
Start: 2017-08-29 | End: 2017-08-31 | Stop reason: HOSPADM

## 2017-08-29 RX ORDER — SODIUM CHLORIDE 0.9 % (FLUSH) 0.9 %
5-10 SYRINGE (ML) INJECTION AS NEEDED
Status: DISCONTINUED | OUTPATIENT
Start: 2017-08-29 | End: 2017-08-29 | Stop reason: HOSPADM

## 2017-08-29 RX ORDER — LIDOCAINE HYDROCHLORIDE 10 MG/ML
0.1 INJECTION INFILTRATION; PERINEURAL AS NEEDED
Status: DISCONTINUED | OUTPATIENT
Start: 2017-08-29 | End: 2017-08-29 | Stop reason: HOSPADM

## 2017-08-29 RX ORDER — ASPIRIN 81 MG/1
81 TABLET ORAL EVERY 12 HOURS
Status: DISCONTINUED | OUTPATIENT
Start: 2017-08-29 | End: 2017-08-31 | Stop reason: HOSPADM

## 2017-08-29 RX ORDER — ONDANSETRON 2 MG/ML
INJECTION INTRAMUSCULAR; INTRAVENOUS AS NEEDED
Status: DISCONTINUED | OUTPATIENT
Start: 2017-08-29 | End: 2017-08-29 | Stop reason: HOSPADM

## 2017-08-29 RX ORDER — HYDROMORPHONE HYDROCHLORIDE 2 MG/ML
0.5 INJECTION, SOLUTION INTRAMUSCULAR; INTRAVENOUS; SUBCUTANEOUS
Status: DISCONTINUED | OUTPATIENT
Start: 2017-08-29 | End: 2017-08-29 | Stop reason: HOSPADM

## 2017-08-29 RX ORDER — ALBUTEROL SULFATE 90 UG/1
2 AEROSOL, METERED RESPIRATORY (INHALATION)
Status: DISCONTINUED | OUTPATIENT
Start: 2017-08-29 | End: 2017-08-31 | Stop reason: HOSPADM

## 2017-08-29 RX ORDER — CELECOXIB 200 MG/1
200 CAPSULE ORAL EVERY 12 HOURS
Status: DISCONTINUED | OUTPATIENT
Start: 2017-08-29 | End: 2017-08-31 | Stop reason: HOSPADM

## 2017-08-29 RX ORDER — CEFAZOLIN SODIUM IN 0.9 % NACL 2 G/50 ML
2 INTRAVENOUS SOLUTION, PIGGYBACK (ML) INTRAVENOUS EVERY 8 HOURS
Status: COMPLETED | OUTPATIENT
Start: 2017-08-29 | End: 2017-08-30

## 2017-08-29 RX ORDER — ZAFIRLUKAST 20 MG/1
20 TABLET, FILM COATED ORAL
Status: DISCONTINUED | OUTPATIENT
Start: 2017-08-29 | End: 2017-08-31 | Stop reason: HOSPADM

## 2017-08-29 RX ORDER — ROPIVACAINE HYDROCHLORIDE 2 MG/ML
INJECTION, SOLUTION EPIDURAL; INFILTRATION; PERINEURAL AS NEEDED
Status: DISCONTINUED | OUTPATIENT
Start: 2017-08-29 | End: 2017-08-29 | Stop reason: HOSPADM

## 2017-08-29 RX ORDER — PROPOFOL 10 MG/ML
INJECTION, EMULSION INTRAVENOUS
Status: DISCONTINUED | OUTPATIENT
Start: 2017-08-29 | End: 2017-08-29 | Stop reason: HOSPADM

## 2017-08-29 RX ORDER — DIPHENHYDRAMINE HCL 25 MG
25 CAPSULE ORAL
Status: DISCONTINUED | OUTPATIENT
Start: 2017-08-29 | End: 2017-08-31 | Stop reason: HOSPADM

## 2017-08-29 RX ORDER — ACETAMINOPHEN 500 MG
1000 TABLET ORAL EVERY 6 HOURS
Status: DISCONTINUED | OUTPATIENT
Start: 2017-08-30 | End: 2017-08-29 | Stop reason: ALTCHOICE

## 2017-08-29 RX ORDER — MORPHINE SULFATE 10 MG/ML
INJECTION, SOLUTION INTRAMUSCULAR; INTRAVENOUS AS NEEDED
Status: DISCONTINUED | OUTPATIENT
Start: 2017-08-29 | End: 2017-08-29 | Stop reason: HOSPADM

## 2017-08-29 RX ORDER — TRANEXAMIC ACID 100 MG/ML
INJECTION, SOLUTION INTRAVENOUS AS NEEDED
Status: DISCONTINUED | OUTPATIENT
Start: 2017-08-29 | End: 2017-08-29 | Stop reason: HOSPADM

## 2017-08-29 RX ORDER — PANTOPRAZOLE SODIUM 40 MG/1
40 TABLET, DELAYED RELEASE ORAL
Status: DISCONTINUED | OUTPATIENT
Start: 2017-08-30 | End: 2017-08-31 | Stop reason: HOSPADM

## 2017-08-29 RX ORDER — FLUTICASONE PROPIONATE AND SALMETEROL 250; 50 UG/1; UG/1
1 POWDER RESPIRATORY (INHALATION)
Status: DISCONTINUED | OUTPATIENT
Start: 2017-08-29 | End: 2017-08-31 | Stop reason: HOSPADM

## 2017-08-29 RX ORDER — FENTANYL CITRATE 50 UG/ML
INJECTION, SOLUTION INTRAMUSCULAR; INTRAVENOUS AS NEEDED
Status: DISCONTINUED | OUTPATIENT
Start: 2017-08-29 | End: 2017-08-29 | Stop reason: HOSPADM

## 2017-08-29 RX ORDER — AMOXICILLIN 250 MG
2 CAPSULE ORAL DAILY
Status: DISCONTINUED | OUTPATIENT
Start: 2017-08-30 | End: 2017-08-31 | Stop reason: HOSPADM

## 2017-08-29 RX ORDER — HYDROCODONE BITARTRATE AND ACETAMINOPHEN 7.5; 325 MG/1; MG/1
1 TABLET ORAL
Status: DISCONTINUED | OUTPATIENT
Start: 2017-08-29 | End: 2017-08-31 | Stop reason: HOSPADM

## 2017-08-29 RX ORDER — HYDROCODONE BITARTRATE AND ACETAMINOPHEN 7.5; 325 MG/1; MG/1
2 TABLET ORAL
Status: DISCONTINUED | OUTPATIENT
Start: 2017-08-29 | End: 2017-08-31 | Stop reason: HOSPADM

## 2017-08-29 RX ORDER — NALOXONE HYDROCHLORIDE 0.4 MG/ML
.2-.4 INJECTION, SOLUTION INTRAMUSCULAR; INTRAVENOUS; SUBCUTANEOUS
Status: DISCONTINUED | OUTPATIENT
Start: 2017-08-29 | End: 2017-08-31 | Stop reason: HOSPADM

## 2017-08-29 RX ADMIN — CEFAZOLIN 2 G: 1 INJECTION, POWDER, FOR SOLUTION INTRAMUSCULAR; INTRAVENOUS; PARENTERAL at 11:24

## 2017-08-29 RX ADMIN — FENTANYL CITRATE 25 MCG: 50 INJECTION, SOLUTION INTRAMUSCULAR; INTRAVENOUS at 11:51

## 2017-08-29 RX ADMIN — SODIUM CHLORIDE, SODIUM LACTATE, POTASSIUM CHLORIDE, AND CALCIUM CHLORIDE: 600; 310; 30; 20 INJECTION, SOLUTION INTRAVENOUS at 11:24

## 2017-08-29 RX ADMIN — ONDANSETRON 4 MG: 2 INJECTION INTRAMUSCULAR; INTRAVENOUS at 11:45

## 2017-08-29 RX ADMIN — ASPIRIN 81 MG: 81 TABLET, COATED ORAL at 20:07

## 2017-08-29 RX ADMIN — HYDROMORPHONE HYDROCHLORIDE 0.5 MG: 2 INJECTION, SOLUTION INTRAMUSCULAR; INTRAVENOUS; SUBCUTANEOUS at 14:32

## 2017-08-29 RX ADMIN — CELECOXIB 200 MG: 200 CAPSULE ORAL at 20:07

## 2017-08-29 RX ADMIN — FENTANYL CITRATE 25 MCG: 50 INJECTION, SOLUTION INTRAMUSCULAR; INTRAVENOUS at 11:53

## 2017-08-29 RX ADMIN — FAMOTIDINE 20 MG: 20 TABLET ORAL at 09:39

## 2017-08-29 RX ADMIN — MIDAZOLAM HYDROCHLORIDE 2 MG: 1 INJECTION, SOLUTION INTRAMUSCULAR; INTRAVENOUS at 11:24

## 2017-08-29 RX ADMIN — SODIUM CHLORIDE, SODIUM LACTATE, POTASSIUM CHLORIDE, AND CALCIUM CHLORIDE: 600; 310; 30; 20 INJECTION, SOLUTION INTRAVENOUS at 11:58

## 2017-08-29 RX ADMIN — FENTANYL CITRATE 50 MCG: 50 INJECTION, SOLUTION INTRAMUSCULAR; INTRAVENOUS at 11:24

## 2017-08-29 RX ADMIN — DIPHENHYDRAMINE HYDROCHLORIDE 12.5 MG: 50 INJECTION, SOLUTION INTRAMUSCULAR; INTRAVENOUS at 12:23

## 2017-08-29 RX ADMIN — PROPOFOL 50 MCG/KG/MIN: 10 INJECTION, EMULSION INTRAVENOUS at 11:55

## 2017-08-29 RX ADMIN — MIDAZOLAM HYDROCHLORIDE 2 MG: 1 INJECTION, SOLUTION INTRAMUSCULAR; INTRAVENOUS at 12:23

## 2017-08-29 RX ADMIN — CEFAZOLIN 2 G: 1 INJECTION, POWDER, FOR SOLUTION INTRAMUSCULAR; INTRAVENOUS; PARENTERAL at 18:28

## 2017-08-29 RX ADMIN — HYDROCODONE BITARTRATE AND ACETAMINOPHEN 1 TABLET: 7.5; 325 TABLET ORAL at 20:07

## 2017-08-29 RX ADMIN — DEXAMETHASONE SODIUM PHOSPHATE 10 MG: 4 INJECTION, SOLUTION INTRA-ARTICULAR; INTRALESIONAL; INTRAMUSCULAR; INTRAVENOUS; SOFT TISSUE at 11:45

## 2017-08-29 RX ADMIN — HYDROMORPHONE HYDROCHLORIDE 0.5 MG: 2 INJECTION, SOLUTION INTRAMUSCULAR; INTRAVENOUS; SUBCUTANEOUS at 14:37

## 2017-08-29 RX ADMIN — TRANEXAMIC ACID 1000 MG: 100 INJECTION, SOLUTION INTRAVENOUS at 11:44

## 2017-08-29 RX ADMIN — FLUTICASONE PROPIONATE AND SALMETEROL 1 PUFF: 250; 50 POWDER RESPIRATORY (INHALATION) at 20:07

## 2017-08-29 RX ADMIN — LIDOCAINE HYDROCHLORIDE 0.1 ML: 10 INJECTION, SOLUTION INFILTRATION; PERINEURAL at 09:32

## 2017-08-29 NOTE — ANESTHESIA PROCEDURE NOTES
Spinal Block    Start time: 8/29/2017 11:32 AM  End time: 8/29/2017 11:38 AM  Performed by: Viri Dailey  Authorized by: Viri Dailey     Pre-procedure:   Indications: primary anesthetic  Preanesthetic Checklist: patient identified, risks and benefits discussed, anesthesia consent, site marked, patient being monitored and timeout performed    Timeout Time: 11:32          Spinal Block:   Patient Position:  Seated  Prep Region:  Lumbar  Prep: chlorhexidine      Location:  L3-4  Technique:  Single shot  Local:  Lidocaine 1% (1 mL)  Local Dose (mL):  1    Needle:   Needle Type:  Pencan  Needle Gauge:  25 G  Attempts:  1      Events: CSF confirmed, no blood with aspiration and no paresthesia        Assessment:  Insertion:  Uncomplicated  Patient tolerance:  Patient tolerated the procedure well with no immediate complications  Anesthetic medications:  Marcaine: 12 mg in 8.25% Dextrose

## 2017-08-29 NOTE — PERIOP NOTES
TRANSFER - OUT REPORT:    Verbal report given to receiving nurse Bernie(name) on [address]  being transferred to [facility](unit) for routine progression of care       Report consisted of patients Situation, Background, Assessment and   Recommendations(SBAR). Information from the following report(s) OR Summary, Procedure Summary, Intake/Output and MAR was reviewed with the receiving nurse. Opportunity for questions and clarification was provided.       Patient transported with:   O2 @ 1 liters  Tech

## 2017-08-29 NOTE — IP AVS SNAPSHOT
Aba Fernández 
 
 
 [address] 
[phone] Patient: Evette Villa MRN: [id number] DMP:15/24/3933 You are allergic to the following Allergen Reactions Codeine Unknown (comments) Sulfa (Sulfonamide Antibiotics) Unknown (comments) Zithromax (Azithromycin) Unknown (comments) Recent Documentation Height Weight Breastfeeding? BMI OB Status Smoking Status 1.651 m 96.2 kg No 35.28 kg/m2 Hysterectomy Never Smoker Emergency Contacts Name Discharge Info Relation Home Work Mobile Lashawn Johnson  Other Relative [6] [phone] Maurizio Hanson  Brother [24] [phone] About your hospitalization You were admitted on:  August 29, 2017 You last received care in the:  [facility] You were discharged on:  August 31, 2017 Unit phone number:  [phone] Why you were hospitalized Your primary diagnosis was:  S/P Total Hip Arthroplasty Your diagnoses also included:  Osteoarthritis Providers Seen During Your Hospitalizations Provider Role Specialty Primary office phone Alfreda Mancuso MD Attending Provider Orthopedic Surgery [phone] Your Primary Care Physician (PCP) Primary Care Physician Office Phone Office Fax [address] [facility] [phone] ** None ** Follow-up Information Follow up With Details Comments Contact Info Kathleen Gleason MD  As needed [address] [facility] 
[phone] Alfreda Mancuso MD  As scheduled by office [address] [address] 
[phone] [address]  Will contact you within 48 hrs Stari [address] [place] [address] [address] [facility] [address] 
[phone] Current Discharge Medication List  
  
START taking these medications Dose & Instructions Dispensing Information Comments Morning Noon Evening Bedtime  
 aspirin delayed-release 81 mg tablet Your next dose is: Today Dose:  81 mg Take 1 Tab by mouth every twelve (12) hours every twelve (12) hours for 30 days. Quantity:  60 Tab Refills:  0 HYDROcodone-acetaminophen 7.5-325 mg per tablet Commonly known as:  Lavern Dove Your next dose is: Today Dose:  1-2 Tab Take 1-2 Tabs by mouth every four (4) hours as needed. Max Daily Amount: 12 Tabs. Quantity:  60 Tab Refills:  0 CONTINUE these medications which have NOT CHANGED Dose & Instructions Dispensing Information Comments Morning Noon Evening Bedtime * albuterol 2.5 mg /3 mL (0.083 %) nebulizer solution Commonly known as:  PROVENTIL VENTOLIN Your next dose is: Today 1 vial via nebulizer qid prn   Dx - asthma J45.909 Quantity:  120 Each Refills:  11 Dispense # 120 vials * albuterol 90 mcg/actuation inhaler Commonly known as:  PROAIR HFA Your next dose is: Today 2 puffs qid prn Quantity:  1 Inhaler Refills:  11  
     
   
   
  
   
  
 fluticasone-salmeterol 250-50 mcg/dose diskus inhaler Commonly known as:  ADVAIR DISKUS Your next dose is: Today 1 inhalation bid, rinse mouth after use Quantity:  1 Inhaler Refills:  11  
     
   
   
  
   
  
 magic mouthwash solution Your next dose is: Today 1 tsp swish and swallow tid Quantity:  240 mL Refills:  0 NexIUM 20 mg capsule Generic drug:  esomeprazole Your next dose is:  Tomorrow Dose:  20 mg Take 20 mg by mouth daily. Refills:  0  
     
  
   
   
   
  
 OTHER Handicap Decal Dx: Asthma J45.40 Quantity:  1 Each Refills:  0  
     
   
   
   
  
 zafirlukast 20 mg tablet Commonly known as:  Fredo Cruz Your next dose is: Today 1 po bid Quantity:  60 Tab Refills:  11  
     
   
   
 * Notice: This list has 2 medication(s) that are the same as other medications prescribed for you. Read the directions carefully, and ask your doctor or other care provider to review them with you. STOP taking these medications   
 traMADol 50 mg tablet Commonly known as:  ULTRAM  
   
  
  
  
Where to Get Your Medications Information on where to get these meds will be given to you by the nurse or doctor. ! Ask your nurse or doctor about these medications  
  aspirin delayed-release 81 mg tablet HYDROcodone-acetaminophen 7.5-325 mg per tablet Discharge Instructions [facility] [employer] Patient Discharge Instructions Helene Corrales / [id number] : 1940 Admitted 2017 Discharged: 2017 IF YOU HAVE ANY PROBLEMS ONCE YOU ARE AT HOME CALL THE FOLLOWING NUMBERS:  
Main office number: [phone] Take Home Medications · It is important that you take the medication exactly as they are prescribed. · Keep your medication in the bottles provided by the pharmacist and keep a list of the medication names, dosages, and times to be taken in your wallet. · Do not take other medications without consulting your doctor. What to do at [facility] Resume your prehospital diet. If you have excessive nausea or vomitting call your doctor's office Home Physical Therapy is arranged. Use rolling walker when walking. Use Jin Hose stockings until we see you in the office for your follow up appointment with Dr. Tiff Alcantara. Patients who have had a joint replacement should not drive until you are seen for your follow up appointment by Dr. Tiff Alcantara. When to Call - Call if you have a temperature greater then 101 
- Unable to keep food down - Loose control of your bladder or bowel function - Are unable to bear any weight  
- Need a pain medication refill DISCHARGE SUMMARY from Nurse The following personal items collected during your admission are returned to you:  
Dental Appliance: Dental Appliances: Uppers, Lowers, With patient Vision: Visual Aid: Glasses Hearing Aid:   na 
Jewelry: Jewelry: None Clothing: Clothing: Footwear, Pants, Shirt Other Valuables: Other Valuables: Cell Phone, Leeanna Krause Valuables sent to safe:   na 
 
PATIENT INSTRUCTIONS: 
 
After general anesthesia or intravenous sedation, for 24 hours or while taking prescription Narcotics: · Limit your activities · Do not drive and operate hazardous machinery · Do not make important personal or business decisions · Do  not drink alcoholic beverages · If you have not urinated within 8 hours after discharge, please contact your surgeon on call. Report the following to your surgeon: 
· Excessive pain, swelling, redness or odor of or around the surgical area · Temperature over 101 · Nausea and vomiting lasting longer than 4 hours or if unable to take medications · Any signs of decreased circulation or nerve impairment to extremity: change in color, persistent  numbness, tingling, coldness or increase pain · Follow hip precautions @ all times! · Any questions, call office @ [phone] Keep scheduled follow up appointment. If need to change, call office @ [phone]. *  Please give a list of your current medications to your Primary Care Provider. *  Please update this list whenever your medications are discontinued, doses are 
    changed, or new medications (including over-the-counter products) are added. *  Please carry medication information at all times in case of emergency situations. Hip Replacement Surgery: What to Expect at [facility] Your Recovery Hip replacement surgery replaces the worn parts of your hip joint. When you leave the hospital, you will probably be walking with crutches or a walker.  You may be able to climb a few stairs and get in and out of bed and chairs. But you will need someone to help you at home for the next few weeks or until you have more energy and can move around better. If there is no one to help you at home, you may go to a rehabilitation center or long-term care center. You will go home with a bandage and stitches or staples. You can remove the bandage when your doctor tells you to. Your doctor will remove your stitches or staples 10 days to 3 weeks after your surgery. You may still have some mild pain, and the area may be swollen for 3 to 4 months after surgery. Your doctor will give you medicine for the pain. You will continue the rehabilitation program (rehab) you started in the hospital. The better you do with your rehab exercises, the sooner you will get your strength and movement back. Most people are able to return to work 4 weeks to 4 months after surgery. This care sheet gives you a general idea about how long it will take for you to recover. But each person recovers at a different pace. Follow the steps below to get better as quickly as possible. How can you care for yourself at home? Activity · Your doctor may not want your affected leg to cross the center of your body toward the other leg. If so, your therapist may suggest these ideas: ¨ Do not cross your legs. ¨ Be very careful as you get in or out of bed or a car, so your leg does not cross that imaginary line in the middle of your body. · Rest when you feel tired. You may take a nap, but do not stay in bed all day. · Work with your physical therapist to learn the best way to exercise. You may be able to take frequent, short walks using crutches or a walker. You will probably have to use crutches or a walker for at least 4 to 6 weeks. · Your doctor may advise you to stay away from activities that put stress on the joint. This includes sports such as tennis, football, and jogging. · Do not sit for longer than 30 to 45 minutes at a time.  When you sit, use chairs with arms, and do not sit in low chairs. · Do not bend over more than 90 degrees (like the angle in a letter \"L\"). · Sleep on your back with your legs slightly apart or on your side with a pillow between your knees for about 6 weeks or as your doctor tells you. Do not sleep on your stomach or affected leg. · You may need to take sponge baths until your stitches or staples have been removed. You will probably be able to shower 24 hours after they are removed. · Ask your doctor when you can drive again. · Most people are able to return to work 4 weeks to 4 months after surgery. · Ask your doctor when it is okay for you to have sex. Diet · By the time you leave the hospital, you will probably be eating your normal diet. If your stomach is upset, try bland, low-fat foods like plain rice, broiled chicken, toast, and yogurt. Your doctor may recommend that you take iron and vitamin supplements. · Drink plenty of fluids (unless your doctor tells you not to). · Eat healthy foods, and watch your portion sizes. Try to stay at your ideal weight. Too much weight puts more stress on your new hip joint. · You may notice that your bowel movements are not regular right after your surgery. This is common. Try to avoid constipation and straining with bowel movements. You may want to take a fiber supplement every day. If you have not had a bowel movement after a couple of days, ask your doctor about taking a mild laxative. Medicines · Your doctor will tell you if and when you can restart your medicines. He or she will also give you instructions about taking any new medicines. · If you take blood thinners, such as warfarin (Coumadin), clopidogrel (Plavix), or aspirin, be sure to talk to your doctor. He or she will tell you if and when to start taking those medicines again. Make sure that you understand exactly what your doctor wants you to do.  
· Your doctor may give you a blood-thinning medicine to prevent blood clots. If you take a blood thinner, be sure you get instructions about how to take your medicine safely. Blood thinners can cause serious bleeding problems. This medicine could be in pill form or as a shot (injection). If a shot is necessary, your doctor will tell you how to do this. · Be safe with medicines. Take pain medicines exactly as directed. ¨ If the doctor gave you a prescription medicine for pain, take it as prescribed. ¨ If you are not taking a prescription pain medicine, ask your doctor if you can take an over-the-counter medicine. · If you think your pain medicine is making you sick to your stomach: 
¨ Take your medicine after meals (unless your doctor has told you not to). ¨ Ask your doctor for a different pain medicine. · If your doctor prescribed antibiotics, take them as directed. Do not stop taking them just because you feel better. You need to take the full course of antibiotics. Incision care · You will have a bandage over the cut (incision) and staples or stitches. Follow your doctor's instructions on when to take the bandage off. Giving the incision air will help it heal. 
· Your doctor will remove the staples or stitches 10 days to 3 weeks after the surgery and replace them with strips of tape. Leave the strips on for a week or until they fall off. Exercise · Your rehab program will include a number of exercises to do. Always do them as your therapist tells you. Ice and elevation · For pain, put ice or a cold pack on the area for 10 to 20 minutes at a time. Put a thin cloth between the ice and your skin. · Your ankle may swell for about 3 months. Prop up your ankle when you ice it or anytime you sit or lie down. Try to keep it above the level of your heart. This will help reduce swelling. Other instructions · Continue to wear your support stockings as your doctor says. These help to prevent blood clots.  The length of time that you will have to wear them depends on your activity level and the amount of swelling you have. Most people wear these stockings for 4 to 6 weeks after surgery. · Wear medical alert jewelry that says you may need antibiotics before any procedure, including dental work. You can buy this at most drugsClearwater Analyticses. Preventing falls is also very important. To prevent falls: · Arrange furniture so that you will not trip on it. · Get rid of throw rugs, and move electrical cords out of the way. · Walk only in areas with plenty of light. · Put grab bars in showers and bathtubs. · Avoid icy or snowy sidewalks. · Wear shoes with sturdy, flat soles. Follow-up care is a key part of your treatment and safety. Be sure to make and go to all appointments, and call your doctor if you are having problems. It's also a good idea to know your test results and keep a list of the medicines you take. When should you call for help? Call 911 anytime you think you may need emergency care. For example, call if: 
· You passed out (lost consciousness). · You have severe trouble breathing. · You have sudden chest pain and shortness of breath, or you cough up blood. Call your doctor now or seek immediate medical care if: 
· You have signs that your hip may be dislocated, including: ¨ Severe pain and not being able to stand. ¨ A crooked leg that looks like your hip is out of position. ¨ Not being able to bend or straighten your leg. · Your leg or foot is cool or pale or changes color. · You cannot feel or move your leg. · You have signs of a blood clot, such as: 
¨ Pain in your calf, back of the knee, thigh, or groin. ¨ Redness and swelling in your leg or groin. · Your incision comes open and begins to bleed, or the bleeding increases. · You feel like your heart is racing or beating irregularly. · You have signs of infection, such as: 
¨ Increased pain, swelling, warmth, or redness. ¨ Red streaks leading from the incision. ¨ Pus draining from the incision. ¨ A fever. Watch closely for changes in your health, and be sure to contact your doctor if: 
· You do not have a bowel movement after taking a laxative. · You do not get better as expected. Where can you learn more? Go to http://sammi-urszula.info/. Enter R278 in the search box to learn more about \"Hip Replacement Surgery: What to Expect at Home. \" Current as of: March 21, 2017 Content Version: 11.3 © 5740-4476 [employer]. Care instructions adapted under license by [employer] (which disclaims liability or warranty for this information). If you have questions about a medical condition or this instruction, always ask your healthcare professional. [address] any warranty or liability for your use of this information. These are general instructions for a healthy lifestyle: No smoking/ No tobacco products/ Avoid exposure to second hand smoke Surgeon General's Warning:  Quitting smoking now greatly reduces serious risk to your health. Obesity, smoking, and sedentary lifestyle greatly increases your risk for illness A healthy diet, regular physical exercise & weight monitoring are important for maintaining a healthy lifestyle You may be retaining fluid if you have a history of heart failure or if you experience any of the following symptoms:  Weight gain of 3 pounds or more overnight or 5 pounds in a week, increased swelling in our hands or feet or shortness of breath while lying flat in bed. Please call your doctor as soon as you notice any of these symptoms; do not wait until your next office visit. Recognize signs and symptoms of STROKE: 
 
F-face looks uneven A-arms unable to move or move even S-speech slurred or non-existent T-time-call 911 as soon as signs and symptoms begin-DO NOT go Back to bed or wait to see if you get better-TIME IS BRAIN. The discharge information has been reviewed with the patient. The patient verbalized understanding. Information obtained by : 
I understand that if any problems occur once I am at home I am to contact my physician. I understand and acknowledge receipt of the instructions indicated above. Physician's or R.N.'s Signature                                                                  Date/Time Patient or Representative Signature                                                          Date/Time Discharge Orders None [employer] Announcement We are excited to announce that we are making your provider's discharge notes available to you in [employer]. You will see these notes when they are completed and signed by the physician that discharged you from your recent hospital stay. If you have any questions or concerns about any information you see in [employer], please call the Health Information Department where you were seen or reach out to your Primary Care Provider for more information about your plan of care. Introducing [facility]! [facility] introduces [employer] patient portal. Now you can access parts of your medical record, email your doctor's office, and request medication refills online. 1. In your internet browser, go to [email or web address] 2. Click on the First Time User? Click Here link in the Sign In box. You will see the New Member Sign Up page. 3. Enter your [employer] Access Code exactly as it appears below. You will not need to use this code after youve completed the sign-up process. If you do not sign up before the expiration date, you must request a new code. · [employer] Access Code: [email or web address] Expires: 10/3/2017  2:21 PM 
 
4. Enter the last four digits of your Social Security Number (xxxx) and Date of Birth (mm/dd/yyyy) as indicated and click Submit. You will be taken to the next sign-up page. 5. Create a [employer] ID. This will be your [employer] login ID and cannot be changed, so think of one that is secure and easy to remember. 6. Create a [employer] password. You can change your password at any time. 7. Enter your Password Reset Question and Answer. This can be used at a later time if you forget your password. 8. Enter your e-mail address. You will receive e-mail notification when new information is available in [address] 9. Click Sign Up. You can now view and download portions of your medical record. 10. Click the Download Summary menu link to download a portable copy of your medical information. If you have questions, please visit the Frequently Asked Questions section of the [employer] website. Remember, [employer] is NOT to be used for urgent needs. For medical emergencies, dial 911. Now available from your iPhone and Android! General Information Please provide this summary of care documentation to your next provider. Patient Signature:  ____________________________________________________________ Date:  ____________________________________________________________  
  
Sara Smith Provider Signature:  ____________________________________________________________ Date:  ____________________________________________________________

## 2017-08-29 NOTE — ANESTHESIA PREPROCEDURE EVALUATION
Anesthetic History   No history of anesthetic complications            Review of Systems / Medical History  Patient summary reviewed, nursing notes reviewed and pertinent labs reviewed    Pulmonary        Sleep apnea    Asthma : well controlled       Neuro/Psych   Within defined limits           Cardiovascular                  Exercise tolerance: >4 METS     GI/Hepatic/Renal     GERD: well controlled      Hiatal hernia     Endo/Other        Obesity     Other Findings            Physical Exam    Airway  Mallampati: II  TM Distance: 4 - 6 cm  Neck ROM: normal range of motion   Mouth opening: Normal     Cardiovascular    Rhythm: regular           Dental    Dentition: Lower partial plate and Full upper dentures     Pulmonary  Breath sounds clear to auscultation               Abdominal  GI exam deferred       Other Findings            Anesthetic Plan    ASA: 3  Anesthesia type: spinal            Anesthetic plan and risks discussed with: Patient and Son / Daughter

## 2017-08-29 NOTE — PROGRESS NOTES
08/29/17 1700   Oxygen Therapy   O2 Sat (%) 96 %   Pulse via Oximetry 81 beats per minute   O2 Device Nasal cannula   O2 Flow Rate (L/min) 1 l/min   Patient achieved   1500    Ml/sec on IS. Patient encouraged to do every hour while awake-patient agreed and demonstrated. No shortness of breath or distress noted. BS are clear b/l. Pt sleepy, o2 left on.    Joint camp notes reviewed- continuous sat # 6 ordered HS

## 2017-08-29 NOTE — PERIOP NOTES
Betadine lavage:  17.5cc of betadine lot #[id number], exp. 04/19,  in 500cc of . 9NS Lot #[id number], exp.  [id number] : RIGHT HIP

## 2017-08-29 NOTE — OP NOTES
[facility]  Total Hip Procedure Note  Patient:Dinorah Neal   : 1940  Medical Record [id number]      Pre-operative Diagnosis:  Unilateral primary osteoarthritis, right hip [M16.11]  Post-operative Diagnosis: Unilateral primary osteoarthritis, right hip [M16.11]    Surgeon: Wiley Severance, MD  Assistant:Steven Horn PA-C    Anesthesia: Spinal      Procedure: Total Hip Arthroplasty   The complexity of the total joint surgery requires the use of a first assistant for positioning, retraction and assistance in closure. The patient's Body mass index is 35.28 kg/(m^2). , BMI's greater then 40 make surgical exposure and retraction extremely difficult and increase operative time. Radha Cheng was brought to the operating room and positioned on the operating table. She was anethestized with anesthesia. A flores catheter was placed preoperatively and IV antibiotics per CMS protocol was administered. Prior to the incision being made a timeout was called identifying the patient, procedure ,operative side and surgeon. Radha Cheng was positioned in the lateral decubitus position with the  right  side up. The limb was prepped and draped in the usual sterile fashion. The direct lateral approach was utilized to expose the hip. The incision was carried through the subcutaneous tissue and underlying fascia with homeostasis obtained using the bovie cauterization. A Charmley retractor was inserted. The abductors were taken down at the junction of the anterior and middle third. The sciatic nerve was palpated and identified. Following comparison of leg lengths, the femoral head was dislocated. The neck was osteotomized in the appropriate position just above the lesser trochanteric region. The acetabular retractor was placed and the appropriate capsulotomy performed. Soft tissue was removed from the acetabulum. The acetabulum was sequentially reamed.   Using trial components the acetabulum was sized to a 54 mm acetabular cup. Utilizing the femoral retractor, the canal was prepared with appropriate laterization and reamed with the starter reamer. The canal was progressively reamed to a 5 tapered reamer. The canal was then broached progressively to size 5. The calcar planar was untilized. A trial reduction with a size +5.0 neck length was utilized. The Head size was 36mm. This was found to be the most stable to flexion greater than 90 degrees and an internal and external rotation. Limb lengths were found to be equilibrated and with appropriate stability as mentioned above. All trial components were removed. The cementless permanent stem was impacted into place. A trial reduction was performed and the appropiate neck length was selected. A permanent 36mm femoral head was impacted into place. Dinorah A Peskopos's hip was reduced and the stability was as mentioned as above. The betadine lavage protocol was used. The sciatic nerve was palpated and noted to be intact. The abductors were repaired through drill holes in the greater trochanter. The remainder was closed in layers with staples for the skin. The patient was then rolled to a supine position. The sponge and needle counts were correct. The patient tolerated the procedure without difficulty and left the operating room in satisfactory condition. EBL:300cc  Additional Findings: Severe DJD with chronic RCT of the hip  Implants:   Implant Name Type Inv.  Item Serial No.  Lot No. LRB No. Used Action   LINER ACET PINN NEUT [id number] -- ALTRX - [id number]  LINER ACET PINN NEUT [id number] -- ALTRX [id number] [facility] [id number] Right 1 Implanted   CUP ACET PINN 100 W/ 54 --  - [id number]  CUP ACET PINN 100 W/ 54 --  [id number] [facility] [id number] Right 1 Implanted   PLUG ACET APCL H ELIM POS STP --  - [id number]  PLUG ACET APCL H ELIM POS STP --  [id number] [facility] [id number] Right 1 Implanted   STEM FEM 12/14 TAPR STD SZ 5 -- [place] - [id number]  STEM FEM 12/14 TAPR STD SZ 5 -- [employer] [id number] [facility] [employer] [id number] Right 1 Implanted   HEAD FEM S-ROM 36MM +5MM NK -- Mariangel Boateng - [id number]   HEAD FEM S-ROM 36MM +5MM NK -- Mariangel Boateng [id number] [facility] [employer] [id number] Right 1 Implanted     Signed By: Srini Sotelo MD

## 2017-08-29 NOTE — PERIOP NOTES
TRANSFER - OUT REPORT:    Verbal report given to Jody(name) on Dinorah Neal  being transferred to pre-op(unit) for routine progression of care       Report consisted of patients Situation, Background, Assessment and   Recommendations(SBAR). Information from the following report(s) SBAR and MAR was reviewed with the receiving nurse. Lines:   Peripheral IV 86/60/40 Left Cephalic (Active)   Site Assessment Clean, dry, & intact 8/29/2017  9:31 AM   Phlebitis Assessment 0 8/29/2017  9:31 AM   Infiltration Assessment 0 8/29/2017  9:31 AM   Dressing Status Clean, dry, & intact 8/29/2017  9:31 AM   Dressing Type Transparent;Tape 8/29/2017  9:31 AM   Hub Color/Line Status Green; Infusing 8/29/2017  9:31 AM        Opportunity for questions and clarification was provided.       Patient transported with:   Tech   BS=96  Heart rate irregular

## 2017-08-29 NOTE — PERIOP NOTES
TRANSFER - IN REPORT:    Verbal report received from Domingo Simon RN on [address]  being received from [address] [place] [address] for routine progression of care      Report consisted of patients Situation, Background, Assessment and   Recommendations(SBAR). Information from the following report(s) SBAR and MAR was reviewed with the receiving nurse. Opportunity for questions and clarification was provided. Assessment completed upon patients arrival to unit and care assumed.

## 2017-08-29 NOTE — INTERVAL H&P NOTE
H&P Update:  Dinorah Neal was seen and examined. History and physical has been reviewed. The patient has been examined.  There have been no significant clinical changes since the completion of the originally dated History and Physical.    Signed By: YOHANNES Aguilar     August 29, 2017 11:06 AM

## 2017-08-29 NOTE — CONSULTS
Hospitalist Consult Note     Admit Date:  2017  5:07 AM   Name:  Kasey Villa   Age:  78 y.o.  :  1940   MRN:  [id number]   PCP:  Hua Christy MD  Treatment Team: Attending Provider: Leticia Keller MD; Utilization Review: Jodell Essex, RN; Consulting Provider: Carlyle Goldman MD; Care Manager: CARLOS Sommers    HPI:       Ms. Richard Cunningham is a 77 yo female with PMH of asthma, LENNY on CPAP postop right RAVI. Sleepy postop, denies dyspnea         ROS  as noted in HPI.   Past Medical History:   Diagnosis Date    Arthritis     Asthma exacerbation     hospitalization     Bilateral arm fractures 2013    GERD (gastroesophageal reflux disease)     Hiatal hernia     Morbid obesity ([address]       Past Surgical History:   Procedure Laterality Date    HX HYSTERECTOMY  's    HX KNEE REPLACEMENT Left     HX KNEE REPLACEMENT Right 2006    HX ORTHOPAEDIC  2013    arm      Current Facility-Administered Medications   Medication Dose Route Frequency    albuterol (PROVENTIL VENTOLIN) nebulizer solution 2.5 mg  2.5 mg Nebulization Q6H PRN    albuterol (PROVENTIL HFA, VENTOLIN HFA, PROAIR HFA) inhaler 2 Puff  2 Puff Inhalation Q6H PRN    albuterol (PROVENTIL VENTOLIN) nebulizer solution 2.5 mg  2.5 mg Nebulization Q6H PRN    [START ON 2017] pantoprazole (PROTONIX) tablet 40 mg  40 mg Oral ACB    zafirlukast (ACCOLATE) tablet 20 mg  20 mg Oral ACB&D    0.9% sodium chloride infusion  100 mL/hr IntraVENous CONTINUOUS    sodium chloride (NS) flush 5-10 mL  5-10 mL IntraVENous Q8H    sodium chloride (NS) flush 5-10 mL  5-10 mL IntraVENous PRN    ceFAZolin in 0.9% NS (ANCEF) IVPB soln 2 g  2 g IntraVENous Q8H    celecoxib (CELEBREX) capsule 200 mg  200 mg Oral Q12H    HYDROmorphone (PF) (DILAUDID) injection 1 mg  1 mg IntraVENous Q3H PRN    naloxone (NARCAN) injection 0.2-0.4 mg  0.2-0.4 mg IntraVENous Q10MIN PRN    [START ON 2017] dexamethasone (DECADRON) injection 10 mg  10 mg IntraVENous ONCE    promethazine (PHENERGAN) tablet 25 mg  25 mg Oral Q6H PRN    diphenhydrAMINE (BENADRYL) capsule 25 mg  25 mg Oral Q4H PRN    [START ON 8/30/2017] senna-docusate (PERICOLACE) 8.6-50 mg per tablet 2 Tab  2 Tab Oral DAILY    zolpidem (AMBIEN) tablet 5 mg  5 mg Oral QHS PRN    aspirin delayed-release tablet 81 mg  81 mg Oral Q12H    HYDROcodone-acetaminophen (NORCO) 7.5-325 mg per tablet 1 Tab  1 Tab Oral Q4H PRN    ondansetron (ZOFRAN ODT) tablet 8 mg  8 mg Oral Q8H PRN    hydrALAZINE (APRESOLINE) 20 mg/mL injection 10 mg  10 mg IntraVENous Q6H PRN    fluticasone-salmeterol (ADVAIR) 250mcg-50mcg/puff  1 Puff Inhalation BID RT    HYDROcodone-acetaminophen (NORCO) 7.5-325 mg per tablet 2 Tab  2 Tab Oral Q4H PRN     Allergies   Allergen Reactions    Codeine Unknown (comments)    Sulfa (Sulfonamide Antibiotics) Unknown (comments)    Zithromax [Azithromycin] Unknown (comments)      Social History   Substance Use Topics    Smoking status: Never Smoker    Smokeless tobacco: Never Used    Alcohol use No      Family History   Problem Relation Age of Onset    Lung Disease Neg Hx       Immunization History   Administered Date(s) Administered    Influenza Vaccine 09/01/2014, 09/01/2015, 10/01/2016    Pneumococcal Polysaccharide (PPSV-23) 07/01/2010    TB Skin Test (PPD) Intradermal 01/28/2013       Objective:   Patient Vitals for the past 24 hrs:   Temp Pulse Resp BP SpO2   08/29/17 1500 - 87 15 174/81 97 %   08/29/17 1430 - 91 14 175/84 96 %   08/29/17 1415 - 92 15 (!) 183/94 97 %   08/29/17 1400 - 79 15 (!) 195/98 100 %   08/29/17 1345 - 86 15 188/81 98 %   08/29/17 1330 - 79 14 149/68 99 %   08/29/17 1325 - 86 15 160/72 98 %   08/29/17 1320 - 83 15 125/69 100 %   08/29/17 1315 98.2 °F (36.8 °C) 87 12 119/65 100 %   08/29/17 0911 97.6 °F (36.4 °C) 86 17 (!) 163/97 94 %     Oxygen Therapy  O2 Sat (%): 97 % (08/29/17 1500)  O2 Device: Nasal cannula (08/29/17 1320)  O2 Flow Rate (L/min): 1 l/min (08/29/17 1500)    Intake/Output Summary (Last 24 hours) at 08/29/17 1603  Last data filed at 08/29/17 1421   Gross per 24 hour   Intake             2290 ml   Output              400 ml   Net             1890 ml       Physical Exam:  General:    Well nourished. Alert. Eyes:   Normal sclera. Extraocular movements intact. ENT:  Normocephalic, atraumatic. Moist mucous membranes  CV:   RRR. No murmur, rub, or gallop. Lungs:  CTAB. No wheezing, rhonchi, or rales. Anterior exam   Abdomen: Soft, nontender, nondistended. Bowel sounds normal.   Extremities: Warm and dry. No cyanosis or edema. Neurologic: grossly intact. Skin:     No rashes or jaundice. Psych:  Normal mood and affect. I reviewed the labs, imaging, EKGs, telemetry, and other studies done this admission. Data Review:   Recent Results (from the past 24 hour(s))   TYPE & SCREEN    Collection Time: 08/29/17  9:20 AM   Result Value Ref Range    Crossmatch Expiration 09/01/2017     ABO/Rh(D) Nesha Nicolas POSITIVE     Antibody screen NEG    GLUCOSE, POC    Collection Time: 08/29/17  9:36 AM   Result Value Ref Range    Glucose (POC) 96 65 - 100 mg/dL       All Micro Results     None          Other Studies:  No results found.     Assessment and Plan:     Hospital Problems as of 8/29/2017  Date Reviewed: 7/19/2017          Codes Class Noted - Resolved POA    Osteoarthritis ICD-10-CM: M19.90  ICD-9-CM: 715.90  8/29/2017 - Present Unknown              PLAN:  · Continue albuterol, advair and accolate  · CPAP QHS  · Hydralazine prn  · Will be available as needed thanks     Signed:  Juana Vidal MD

## 2017-08-30 PROBLEM — Z96.649 S/P TOTAL HIP ARTHROPLASTY: Status: ACTIVE | Noted: 2017-08-30

## 2017-08-30 LAB
ANION GAP SERPL CALC-SCNC: 4 MMOL/L (ref 7–16)
BUN SERPL-MCNC: 13 MG/DL (ref 8–23)
CALCIUM SERPL-MCNC: 8.7 MG/DL (ref 8.3–10.4)
CHLORIDE SERPL-SCNC: 105 MMOL/L (ref 98–107)
CO2 SERPL-SCNC: 31 MMOL/L (ref 21–32)
CREAT SERPL-MCNC: 0.69 MG/DL (ref 0.6–1)
GLUCOSE SERPL-MCNC: 128 MG/DL (ref 65–100)
HGB BLD-MCNC: 12.3 G/DL (ref 11.7–15.4)
POTASSIUM SERPL-SCNC: 5.6 MMOL/L (ref 3.5–5.1)
SODIUM SERPL-SCNC: 140 MMOL/L (ref 136–145)

## 2017-08-30 PROCEDURE — 65270000029 HC RM PRIVATE

## 2017-08-30 PROCEDURE — 74011250637 HC RX REV CODE- 250/637: Performed by: ORTHOPAEDIC SURGERY

## 2017-08-30 PROCEDURE — 74011250636 HC RX REV CODE- 250/636: Performed by: ORTHOPAEDIC SURGERY

## 2017-08-30 PROCEDURE — 77010033678 HC OXYGEN DAILY

## 2017-08-30 PROCEDURE — 94760 N-INVAS EAR/PLS OXIMETRY 1: CPT

## 2017-08-30 PROCEDURE — 97110 THERAPEUTIC EXERCISES: CPT

## 2017-08-30 PROCEDURE — 36415 COLL VENOUS BLD VENIPUNCTURE: CPT | Performed by: ORTHOPAEDIC SURGERY

## 2017-08-30 PROCEDURE — 97116 GAIT TRAINING THERAPY: CPT

## 2017-08-30 PROCEDURE — 94762 N-INVAS EAR/PLS OXIMTRY CONT: CPT

## 2017-08-30 PROCEDURE — 97535 SELF CARE MNGMENT TRAINING: CPT

## 2017-08-30 PROCEDURE — 97161 PT EVAL LOW COMPLEX 20 MIN: CPT

## 2017-08-30 PROCEDURE — 85018 HEMOGLOBIN: CPT | Performed by: ORTHOPAEDIC SURGERY

## 2017-08-30 PROCEDURE — 80048 BASIC METABOLIC PNL TOTAL CA: CPT | Performed by: ORTHOPAEDIC SURGERY

## 2017-08-30 PROCEDURE — 97165 OT EVAL LOW COMPLEX 30 MIN: CPT

## 2017-08-30 PROCEDURE — 97150 GROUP THERAPEUTIC PROCEDURES: CPT

## 2017-08-30 RX ORDER — ASPIRIN 81 MG/1
81 TABLET ORAL EVERY 12 HOURS
Qty: 60 TAB | Refills: 0 | Status: SHIPPED | OUTPATIENT
Start: 2017-08-30 | End: 2017-09-29

## 2017-08-30 RX ORDER — HYDROCODONE BITARTRATE AND ACETAMINOPHEN 7.5; 325 MG/1; MG/1
1-2 TABLET ORAL
Qty: 60 TAB | Refills: 0 | Status: SHIPPED | OUTPATIENT
Start: 2017-08-30 | End: 2018-08-10

## 2017-08-30 RX ADMIN — CELECOXIB 200 MG: 200 CAPSULE ORAL at 21:48

## 2017-08-30 RX ADMIN — HYDROCODONE BITARTRATE AND ACETAMINOPHEN 1 TABLET: 7.5; 325 TABLET ORAL at 05:20

## 2017-08-30 RX ADMIN — ASPIRIN 81 MG: 81 TABLET, COATED ORAL at 21:48

## 2017-08-30 RX ADMIN — SODIUM CHLORIDE 100 ML/HR: 900 INJECTION, SOLUTION INTRAVENOUS at 00:51

## 2017-08-30 RX ADMIN — PANTOPRAZOLE SODIUM 40 MG: 40 TABLET, DELAYED RELEASE ORAL at 05:20

## 2017-08-30 RX ADMIN — SENNOSIDES AND DOCUSATE SODIUM 2 TABLET: 8.6; 5 TABLET ORAL at 10:09

## 2017-08-30 RX ADMIN — ZAFIRLUKAST 20 MG: 20 TABLET, FILM COATED ORAL at 16:30

## 2017-08-30 RX ADMIN — FLUTICASONE PROPIONATE AND SALMETEROL 1 PUFF: 250; 50 POWDER RESPIRATORY (INHALATION) at 21:49

## 2017-08-30 RX ADMIN — Medication 10 ML: at 05:20

## 2017-08-30 RX ADMIN — HYDROCODONE BITARTRATE AND ACETAMINOPHEN 1 TABLET: 7.5; 325 TABLET ORAL at 10:09

## 2017-08-30 RX ADMIN — FLUTICASONE PROPIONATE AND SALMETEROL 1 PUFF: 250; 50 POWDER RESPIRATORY (INHALATION) at 08:59

## 2017-08-30 RX ADMIN — ZAFIRLUKAST 20 MG: 20 TABLET, FILM COATED ORAL at 05:22

## 2017-08-30 RX ADMIN — HYDROCODONE BITARTRATE AND ACETAMINOPHEN 1 TABLET: 7.5; 325 TABLET ORAL at 00:52

## 2017-08-30 RX ADMIN — ASPIRIN 81 MG: 81 TABLET, COATED ORAL at 10:09

## 2017-08-30 RX ADMIN — HYDROCODONE BITARTRATE AND ACETAMINOPHEN 1 TABLET: 7.5; 325 TABLET ORAL at 13:14

## 2017-08-30 RX ADMIN — CEFAZOLIN 2 G: 1 INJECTION, POWDER, FOR SOLUTION INTRAMUSCULAR; INTRAVENOUS; PARENTERAL at 01:48

## 2017-08-30 NOTE — PROGRESS NOTES
Patient is A&Ox4. Able to verbalize needs. Resting quietly with no distress noted. Dressing to surgical site is dry and intact. Neurovascular and peripheral vascular checks WNL. Arcos draining clear yellow urine to bag. Denies needs. Bed low and locked. Call light within reach. Instructed to call for assistance. Patient verbalizes understanding. Will monitor.

## 2017-08-30 NOTE — ANESTHESIA POSTPROCEDURE EVALUATION
Post-Anesthesia Evaluation and Assessment    Patient: Yolanda Streeter MRN: [id number]  SSN: [id number]    YOB: 1940  Age: 78 y.o. Sex: female       Cardiovascular Function/Vital Signs  Visit Vitals    /75    Pulse 75    Temp 35.9 °C (96.6 °F)    Resp 16    Ht 5' 5\" (1.651 m)    Wt 96.2 kg (212 lb)    SpO2 98%    Breastfeeding No    BMI 35.28 kg/m2       Patient is status post spinal anesthesia for Procedure(s):  HIP ARTHROPLASTY TOTAL/ RIGHT. Nausea/Vomiting: None    Postoperative hydration reviewed and adequate. Pain:  Pain Scale 1: Numeric (0 - 10) (08/30/17 0522)  Pain Intensity 1: 0 (08/30/17 0522)   Managed    Neurological Status:   Neuro (WDL): Within Defined Limits (08/29/17 1415)  Neuro  Neurologic State: Alert (08/29/17 1900)  Orientation Level: Oriented X4 (08/29/17 1900)  Speech: Clear (08/29/17 1900)  LUE Motor Response: Purposeful (08/29/17 1900)  LLE Motor Response: Purposeful (08/29/17 1900)  RUE Motor Response: Purposeful (08/29/17 1900)  RLE Motor Response: Purposeful (08/29/17 1900)   At baseline    Mental Status and Level of Consciousness: Arousable    Pulmonary Status:   O2 Device: Nasal cannula (08/29/17 2030)   Adequate oxygenation and airway patent    Complications related to anesthesia: None    Post-anesthesia assessment completed.  No concerns    Signed By: Uvaldo You MD     August 30, 2017

## 2017-08-30 NOTE — DISCHARGE SUMMARY
[address] [place] [address]  Total Joint Discharge Summary      Patient ID:  Radha Cheng  [id number]  45 y.o.  1940    Admit date: 8/29/2017  Discharge date and time: 8-30-17  Admitting Physician: Wiley Severance, MD  Surgeon: Same  Admission Diagnoses: Unilateral primary osteoarthritis, right hip [M16.11]  Discharge Diagnoses: Principal Problem:    S/P total hip arthroplasty (8/30/2017)    Active Problems:    Osteoarthritis (8/29/2017)                                Perioperative Antibiotics: Ancef 1 to 2 mg was given depending on patient's weight. If allergic to Ancef or due to other indications, patient was given Vancomycin. Hospital Medications given:   [unfilled]  [unfilled]  [unfilled]    Discharge Medications given:  Current Discharge Medication List      START taking these medications    Details   aspirin delayed-release 81 mg tablet Take 1 Tab by mouth every twelve (12) hours every twelve (12) hours for 30 days. Qty: 60 Tab, Refills: 0      HYDROcodone-acetaminophen (NORCO) 7.5-325 mg per tablet Take 1-2 Tabs by mouth every four (4) hours as needed. Max Daily Amount: 12 Tabs. Qty: 60 Tab, Refills: 0         CONTINUE these medications which have NOT CHANGED    Details   albuterol (PROAIR HFA) 90 mcg/actuation inhaler 2 puffs qid prn  Qty: 1 Inhaler, Refills: 11      zafirlukast (ACCOLATE) 20 mg tablet 1 po bid  Qty: 60 Tab, Refills: 11      esomeprazole (NEXIUM) 20 mg capsule Take 20 mg by mouth daily.       albuterol (PROVENTIL VENTOLIN) 2.5 mg /3 mL (0.083 %) nebulizer solution 1 vial via nebulizer qid prn    Dx - asthma J45.909  Qty: 120 Each, Refills: 11    Comments: Dispense # 120 vials      magic mouthwash solution 1 tsp swish and swallow tid  Qty: 240 mL, Refills: 0      fluticasone-salmeterol (ADVAIR DISKUS) 250-50 mcg/dose diskus inhaler 1 inhalation bid, rinse mouth after use  Qty: 1 Inhaler, Refills: 11      OTHER Handicap Decal  Dx: Asthma J45.40  Qty: 1 Each, Refills: 0 STOP taking these medications       traMADol (ULTRAM) 50 mg tablet Comments:   Reason for Stopping:                Additional DVT Prophylaxis:  MATTHEW Hose,Plexi-Pulse    Postoperative transfusions:   none  Post Op complications: none    Hemoglobin at discharge:   Lab Results   Component Value Date/Time    HGB 12.3 08/30/2017 04:09 AM       Wound appears to be healing without any evidence of infection. Physical Therapy started on the day following surgery and progressed to independent ambulation with the aid of a walker. At the time of discharge, able to go up and down stairs and had understanding of precautions needed following surgery.       PT/OT:            Assistive Device: Walker (comment)                Discharged to: home    Discharge instructions:  -Rx pain medication given  - Anticoagulate with: Ecotrin 81 mg PO BID x 4 weeks  -Resume pre hospital diet             -Resume home medications per medical continuation form     -Ambulate with walker, appropriate total joint protocol  -Follow up in office as scheduled       Signed:  YOHANNES Mason  8/30/2017  7:18 AM

## 2017-08-30 NOTE — PROGRESS NOTES
2017         Post Op day: 1 Day Post-Op     Admit Date: 2017  Admit Diagnosis: Unilateral primary osteoarthritis, right hip [M16.11]        Subjective: Doing well, No complaints, No SOB, No Chest Pain, No Nausea or Vomiting     Objective:   Vital Signs are Stable, No Acute Distress, Alert and Oriented, Dressing is Dry,  Neurovascular exam is normal.     Assessment / Plan :  Patient Active Problem List   Diagnosis Code    Asthma J45.909    LENNY (obstructive sleep apnea) G47.33    Obesity E66.9    Allergic rhinitis J30.9    GERD (gastroesophageal reflux disease) K21.9    Moderate persistent asthma without complication U08.40    Non-seasonal allergic rhinitis J30.89    Noncompliance with CPAP treatment Z91.14    Osteoarthritis M19.90    S/P total hip arthroplasty Z96.649    Patient Vitals for the past 8 hrs:   BP Temp Pulse Resp SpO2   17 0500 130/75 96.6 °F (35.9 °C) 75 16 98 %   17 0052 156/83 97.4 °F (36.3 °C) 87 18 98 %    Temp (24hrs), Av.9 °F (36.1 °C), Min:95.6 °F (35.3 °C), Max:98.2 °F (36.8 °C)    Body mass index is 35.28 kg/(m^2).     Lab Results   Component Value Date/Time    HGB 12.3 2017 04:09 AM      Pt seen by and discussed with [address] today if therapy goes well       Signed By: YOHANNES Wyatt

## 2017-08-30 NOTE — PROGRESS NOTES
Care Management Interventions  Mode of Transport at Discharge: Self  Transition of Care Consult (CM Consult): [address]: Yes  Discharge Durable Medical Equipment: No  Physical Therapy Consult: Yes  Occupational Therapy Consult: Yes  Current Support Network: Lives with Spouse  Confirm Follow Up Transport: Family  Plan discussed with Pt/Family/Caregiver: Yes  Freedom of Choice Offered: Yes  Discharge Location  Discharge Placement: Home with home health    Patient is a 78y.o. year old female admitted for Right RAVI . Patient lives with Her spouse and plans to return home on discharge. Order received to arrange home health. Patient without preference towards agency. Referral sent to [facility]. Patient denies any equipment needs as he has a walker and bedside commode. Will follow until discharge.   Idalia Ho

## 2017-08-30 NOTE — PROGRESS NOTES
Problem: Mobility Impaired (Adult and Pediatric)  Goal: *Acute Goals and Plan of Care (Insert Text)  GOALS (1-4 days):  (1.)Ms. Neal will move from supine to sit and sit to supine in bed with INDEPENDENT. (2.)Ms. Neal will transfer from bed to chair and chair to bed with INDEPENDENT using the least restrictive device. (3.)Ms. Neal will ambulate with INDEPENDENT for 250 feet with the least restrictive device. (4.)Ms. Neal will ambulate up/down 3 steps with bilateral railing with MINIMAL ASSIST with no device. (5.)Ms. Neal will increase right knee ROM to 5°-80°.  ________________________________________________________________________________________________  Outcome: Progressing Towards Goal      PHYSICAL THERAPY JOINT CAMP TKA: Initial Assessment, Treatment Day: 1st and PM 8/30/2017  INPATIENT: Hospital Day: 2  Payor: Ace Ellison / Plan: Gini Beltrán / Product Type: PPO /      NAME/AGE/GENDER: Helene Corrales is a 78 y.o. female           PRIMARY DIAGNOSIS:  Unilateral primary osteoarthritis, right hip [M16.11]              Procedure(s) and Anesthesia Type:     * HIP ARTHROPLASTY TOTAL/ RIGHT - Spinal (Right)  ICD-10: Treatment Diagnosis:        · Pain in Right Hip (M25.551)  · Stiffness of Right Hip, Not elsewhere classified (M25.651)  · Difficulty in walking, Not elsewhere classified (R26.2)  · Other abnormalities of gait and mobility (R26.89)       ASSESSMENT:      Ms. Michael Luis presents status post right total hip replacement with decreased independence with bed mobility, transfers, gait, and therapeutic exercise. Therapy will maximize independence with functional mobility. Pt progressing with out of bed activity this AM. Pt progressing with ambulation. This section established at most recent assessment   PROBLEM LIST (Impairments causing functional limitations):  1. Decreased Strength  2. Decreased Transfer Abilities  3.  Decreased Ambulation Ability/Technique  4. Decreased Balance  5. Increased Pain  6. Decreased Activity Tolerance  7. Decreased Flexibility/Joint Mobility    INTERVENTIONS PLANNED: (Benefits and precautions of physical therapy have been discussed with the patient.)  1. Bed Mobility  2. Gait Training  3. Home Exercise Program (HEP)  4. Therapeutic Exercise/Strengthening  5. Transfer Training  6. Range of Motion: active/assisted/passive  7. Therapeutic Activities  8. Group Therapy      TREATMENT PLAN: Frequency/Duration: Follow patient BID   to address above goals. Rehabilitation Potential For Stated Goals: GOOD      RECOMMENDED REHABILITATION/EQUIPMENT: (at time of discharge pending progress): Home Health: Physical Therapy. HISTORY:   History of Present Injury/Illness (Reason for Referral):  Decreased mobility ability  Past Medical History/Comorbidities:   Ms. Michael Luis  has a past medical history of Arthritis; Asthma exacerbation; Bilateral arm fractures (2013); GERD (gastroesophageal reflux disease); Hiatal hernia; and Morbid obesity ([address] She also has no past medical history of Aneurysm ([address] Arrhythmia; Autoimmune disease ([address] CAD (coronary artery disease); Cancer ([address] Chronic kidney disease; Chronic obstructive pulmonary disease ([address] Chronic pain; Coagulation disorder ([address] Diabetes ([address] Difficult intubation; Endocarditis; Heart failure ([address] Hypertension; Liver disease; Malignant hyperthermia due to anesthesia; Nausea & vomiting; Pseudocholinesterase deficiency; Psychiatric disorder; PUD (peptic ulcer disease); Rheumatic fever; Seizures ([address] Stroke [facility]); Thromboembolus ([address] or Thyroid disease. Ms. Michael Luis  has a past surgical history that includes hysterectomy (1960's); knee replacement (Left, 2005); knee replacement (Right, 2006); and orthopaedic (2013). Social History/Living Environment:      Prior Level of Function/Work/Activity:  Independent with ambulation and ADLs. Pt lives alone. Number of Personal Factors/Comorbidities that affect the Plan of Care: 0: LOW COMPLEXITY   EXAMINATION:   Most Recent Physical Functioning:   Gross Assessment: Yes  Gross Assessment  AROM: Generally decreased, functional  Strength: Generally decreased, functional  Coordination: Generally decreased, functional  Tone: Normal  Sensation: Intact                        Bed Mobility  Supine to Sit: Minimum assistance     Transfers  Sit to Stand: Minimum assistance  Stand to Sit: Minimum assistance  Bed to Chair: Contact guard assistance     Balance  Sitting: Intact  Standing: Pull to stand; With support    Posture  Posture (WDL): Within defined limits           Weight Bearing Status  Right Side Weight Bearing: As tolerated  Distance (ft): 75 Feet (ft) (walked distance twice)  Ambulation - Level of Assistance: Minimal assistance  Assistive Device: Walker, rolling  Gait Abnormalities: Antalgic         Braces/Orthotics: none     Right Hip Cold  Type: Cold/ice packs       Body Structures Involved:  1. Bones  2. Joints  3. Muscles Body Functions Affected:  1. Neuromusculoskeletal  2. Movement Related Activities and Participation Affected:  1. Mobility   Number of elements that affect the Plan of Care: 1-2: LOW COMPLEXITY   CLINICAL PRESENTATION:   Presentation: Stable and uncomplicated: LOW COMPLEXITY   CLINICAL DECISION MAKIN[address] [place] [address] AM-PAC 6 Clicks   Basic Mobility Inpatient Short Form  How much difficulty does the patient currently have. .. Unable A Lot A Little None   1. Turning over in bed (including adjusting bedclothes, sheets and blankets)? [ ] 1   [ ] 2   [X] 3   [ ] 4   2. Sitting down on and standing up from a chair with arms ( e.g., wheelchair, bedside commode, etc.)   [ ] 1   [ ] 2   [X] 3   [ ] 4   3. Moving from lying on back to sitting on the side of the bed? [ ] 1   [ ] 2   [X] 3   [ ] 4   How much help from another person does the patient currently need. ..  Total A Lot A Little None 4. Moving to and from a bed to a chair (including a wheelchair)? [ ] 1   [ ] 2   [X] 3   [ ] 4   5. Need to walk in hospital room? [ ] 1   [ ] 2   [X] 3   [ ] 4   6. Climbing 3-5 steps with a railing? [ ] 1   [ ] 2   [X] 3   [ ] 4   © 2007, Trustees of [address] [place] [address] [facility], under license to [employer]. All rights reserved       Score:  Initial: 18 Most Recent: X (Date: -- )     Interpretation of Tool:  Represents activities that are increasingly more difficult (i.e. Bed mobility, Transfers, Gait). Score 24 23 22-20 19-15 14-10 9-7 6       Modifier CH CI CJ CK CL CM CN         · Mobility - Walking and Moving Around:               - CURRENT STATUS:    CK - 40%-59% impaired, limited or restricted               - GOAL STATUS:           CK - 40%-59% impaired, limited or restricted               - D/C STATUS:                       CK - 40%-59% impaired, limited or restricted  Payor: Celio Ni / Plan: Garry Chaney / Product Type: PPO /       Medical Necessity:     · Skilled intervention continues to be required due to decreased mobility ability. Reason for Services/Other Comments:  · Patient continues to require skilled intervention due to decreased mobility ability. Use of outcome tool(s) and clinical judgement create a POC that gives a: Clear prediction of patient's progress: LOW COMPLEXITY                 TREATMENT:   (In addition to Assessment/Re-Assessment sessions the following treatments were rendered)      Pre-treatment Symptoms/Complaints:  none  Pain: Initial:   Pain Intensity 1: 0  Pain Location 1: Hip  Pain Orientation 1: Right  Post Session:  0      Therapeutic Exercise: (  45 minutes--group):  Exercises per grid below to improve mobility. Gait Training (  15 minutes):  Gait training to improve and/or restore physical functioning as related to mobility.    Assistive Device: Walker, rolling  Ambulation - Level of Assistance: Minimal assistance  Distance (ft): 75 Feet (ft) (walked distance twice)            Date:  8/30/17 Date:    Date:      ACTIVITY/EXERCISE AM PM AM PM AM PM   GROUP THERAPY  [ ]  Jazmin ]  [ ]  [ ]  [ ]  [ ]   Ankle Pumps 10  15           Quad Sets 10  15           Gluteal Sets 10  15           Hip ABd/ADduction 10  15           Straight Leg Raises               Knee Slides 10  15           Short Arc Quads   15           Long Arc Quads   15            Chair Slides                               B = bilateral; AA = active assistive; A = active; P = passive       Treatment/Session Assessment:         Response to Treatment:  Pt agreeable to ambulate to rehab room. Education:  [X] Home Exercises  [X] Fall Precautions  Jazmin ] Hip Precautions [ ] D/C Instruction Review  Jazmin ] Knee/Hip Prosthesis Review  [ ] Al Gandhi Management/Safety [ ] Adaptive Equipment as Needed         Interdisciplinary Collaboration:   · Physical Therapist, Registered Nurse and Rehabilitation Attendant     After treatment position/precautions:   · Up in chair  · Bed/Chair-wheels locked  · Bed in low position  · Call light within reach  · RN notified     Compliance with Program/Exercises: compliant most of the time. Recommendations/Intent for next treatment session:  Treatment next visit will focus on increasing Ms. Neal's independence with bed mobility, transfers, gait training, strength/ROM exercises, modalities for pain, and patient education.        Total Treatment Duration:  PT Patient Time In/Time Out  Time In: 1300  Time Out: 11[facility], PT

## 2017-08-30 NOTE — PROGRESS NOTES
Problem: Self Care Deficits Care Plan (Adult)  Goal: *Acute Goals and Plan of Care (Insert Text)  GOALS:   DISCHARGE GOALS (in preparation for going home/rehab): 3 days  1. Ms. Michell Smiley will perform one lower body dressing activity with minimal assistance with adaptive equipment to demonstrate improved functional mobility and safety. 2. Ms. Michell Smiley will perform one lower body bathing activity with minimal assistance with adaptive equipment to demonstrate improved functional mobility and safety. 3. Ms. Michell Smiley will perform toileting/toilet transfer with contact guard assistance with adaptive equipment to demonstrate improved functional mobility and safety. 4. Ms. Michell Smiley will perform shower transfer with contact guard assistance with adaptive equipment to demonstrate improved functional mobility and safety. 5. Ms. Michell Smiley will state RAVI precautions with two verbal cues to demonstrate improved functional mobility and safety. JOINT CAMP OCCUPATIONAL THERAPY RAVI: Initial Assessment and Daily Note 8/30/2017  INPATIENT: Hospital Day: 2  Payor: Nguyen Burton / Plan: Ling Wilhelm / Product Type: PPO /      NAME/AGE/GENDER: Ananda Murphy is a 78 y.o. female           PRIMARY DIAGNOSIS:  Unilateral primary osteoarthritis, right hip [M16.11]              Procedure(s) and Anesthesia Type:     * HIP ARTHROPLASTY TOTAL/ RIGHT - Spinal (Right)  ICD-10: Treatment Diagnosis:        · Pain in Right Hip (M25.551)  · Stiffness of Right Hip, Not elsewhere classified (M25.651)       ASSESSMENT:      Ms. Michell Neal is s/p R RAVI and presents with decreased weight bearing on R LE and decreased independence with functional mobility and activities of daily living.   Initial evaluation completed and Patient completed shower and dressing as charter below in ADL grid and is ambulating with rolling walker and contact guard assist.  Patient has met 5/5 goals and plans to return home with good family support. Family able to provide patient with appropriate level of assistance at this time. OT reviewed hip precautions throughout session and issued long handled sponge for home use. Patient instructed to call for assistance when needing to get up from recliner and all needs in reach. Patient verbalized understanding of call light. This section established at most recent assessment   PROBLEM LIST (Impairments causing functional limitations):  1. Decreased Strength  2. Decreased ADL/Functional Activities  3. Decreased Transfer Abilities  4. Increased Pain  5. Increased Fatigue  6. Decreased Flexibility/Joint Mobility  7. Decreased Knowledge of Precautions    INTERVENTIONS PLANNED: (Benefits and precautions of occupational therapy have been discussed with the patient.)  1. Activities of daily living training  2. Adaptive equipment training  3. Balance training  4. Clothing management  5. Donning&doffing training  6. Theraputic activity      TREATMENT PLAN: Frequency/Duration: Follow patient qd to address above goals. Rehabilitation Potential For Stated Goals: GOOD      RECOMMENDED REHABILITATION/EQUIPMENT: (at time of discharge pending progress): Continue Skilled Therapy and Home Health: Physical Therapy. OCCUPATIONAL PROFILE AND HISTORY:   History of Present Injury/Illness (Reason for Referral): Pt presents this date s/p (R) RAVI. Past Medical History/Comorbidities:   Ms. Bibiana Frias  has a past medical history of Arthritis; Asthma exacerbation; Bilateral arm fractures (2013); GERD (gastroesophageal reflux disease); Hiatal hernia; and Morbid obesity ([address] She also has no past medical history of Aneurysm ([address] Arrhythmia; Autoimmune disease ([address] CAD (coronary artery disease); Cancer ([address] Chronic kidney disease; Chronic obstructive pulmonary disease ([address] Chronic pain; Coagulation disorder ([address] Diabetes ([address] Difficult intubation; Endocarditis;  Heart failure ([address] Hypertension; Liver disease; Malignant hyperthermia due to anesthesia; Nausea & vomiting; Pseudocholinesterase deficiency; Psychiatric disorder; PUD (peptic ulcer disease); Rheumatic fever; Seizures ([facility] Stroke [facility]); Thromboembolus ([facility] or Thyroid disease. Ms. Bre Chowdhury  has a past surgical history that includes hysterectomy (1960's); knee replacement (Left, 2005); knee replacement (Right, 2006); and orthopaedic (2013). Social History/Living Environment:      Prior Level of Function/Work/Activity:  Independent    Number of Personal Factors/Comorbidities that affect the Plan of Care: Expanded review of therapy/medical records (1-2):  MODERATE COMPLEXITY   ASSESSMENT OF OCCUPATIONAL PERFORMANCE[de-identified]   Most Recent Physical Functioning:   Balance  Sitting: Intact  Standing: Pull to stand; With support        Patient Vitals for the past 6 hrs:       BP SpO2 O2 Flow Rate (L/min) Pulse   08/30/17 0500 130/75 98 % - 75   08/30/17 0739 129/75 99 % - 79   08/30/17 0857 - 96 % 3 l/min -   08/30/17 0903 - 94 % 0 l/min -        Gross Assessment: Yes  Gross Assessment  AROM: Within functional limits (BUE)  Strength: Within functional limits (BUE)  Coordination: Within functional limits (BUE)  Tone: Normal  Sensation: Intact                    Vision  Acuity: Within Defined Limits     Mental Status  Neurologic State: Alert  Orientation Level: Oriented X4  Cognition: Follows commands  Perception: Appears intact  Perseveration: No perseveration noted  Safety/Judgement: Awareness of environment; Fall prevention                    Basic ADLs (From Assessment) Complex ADLs (From Assessment)   Basic ADL  Feeding: Setup  Oral Facial Hygiene/Grooming: Setup  Bathing: Setup  Upper Body Dressing: Setup  Lower Body Dressing: Setup (with reacher)  Toileting: Setup     Grooming/Bathing/Dressing Activities of Daily Living     Cognitive Retraining  Safety/Judgement: Awareness of environment; Fall prevention                 Functional Transfers  Toilet Transfer : Contact guard assistance  Shower Transfer: Contact guard assistance     Bed/Mat Mobility  Supine to Sit: Minimum assistance  Sit to Stand: Minimum assistance  Bed to Chair: Contact guard assistance           Physical Skills Involved:  1. Balance  2. Strength  3. Activity Tolerance Cognitive Skills Affected (resulting in the inability to perform in a timely and safe manner): 1. none Psychosocial Skills Affected:  1. none   Number of elements that affect the Plan of Care: 1-3:  LOW COMPLEXITY   CLINICAL DECISION MAKIN[address] [place] [address] AM-PAC 6 Clicks   Daily Activity Inpatient Short Form  How much help from another person does the patient currently need. .. Total A Lot A Little None   1. Putting on and taking off regular lower body clothing?   [ ] 1   [X] 2   [ ] 3   [ ] 4   2. Bathing (including washing, rinsing, drying)? [ ] 1   [X] 2   [ ] 3   [ ] 4   3. Toileting, which includes using toilet, bedpan or urinal?   [ ] 1   [X] 2   [ ] 3   [ ] 4   4. Putting on and taking off regular upper body clothing?   [ ] 1   [ ] 2   [X] 3   [ ] 4   5. Taking care of personal grooming such as brushing teeth? [ ] 1   [ ] 2   [X] 3   [ ] 4   6. Eating meals? [ ] 1   [ ] 2   [X] 3   [ ] 4   © , Trustees of [address] [place] [address], under license to [employer]. All rights reserved   Score:  Initial: 15 Most Recent: X (Date: -- )     Interpretation of Tool:  Represents activities that are increasingly more difficult (i.e. Bed mobility, Transfers, Gait).        Score 24 23 22-20 19-15 14-10 9-7 6       Modifier CH CI CJ CK CL CM CN         · Self Care:               - CURRENT STATUS:    CK - 40%-59% impaired, limited or restricted               - GOAL STATUS:           CJ - 20%-39% impaired, limited or restricted               - D/C STATUS:                       ---------------To be determined---------------  Payor: Garret Thompson / Plan: Joe Ayala / Product Type: PPO /       Medical Necessity:     · Patient is expected to demonstrate progress in strength, balance, coordination and functional technique to increase independence with self care and functional mobility. Reason for Services/Other Comments:  · Patient continues to require skilled intervention due to decrease self care and mobility. Use of outcome tool(s) and clinical judgement create a POC that gives a: LOW COMPLEXITY                 TREATMENT:   (In addition to Assessment/Re-Assessment sessions the following treatments were rendered)      Pre-treatment Symptoms/Complaints:  No complaints  Pain: Initial:   Pain Intensity 1: 0  Post Session:  0      Self Care: (40): Procedure(s) (per grid) utilized to improve and/or restore self-care/home management as related to dressing, bathing, toileting and grooming. Required minimal verbal and   cueing to facilitate activities of daily living skills and compensatory activities. Assessment/Reassessment only, no treatment provided today     Treatment/Session Assessment:         Response to Treatment:  Tolerated well. Education:  [ ] Home Exercises  [X] Fall Precautions  [X] Hip Precautions [ ] Going Home Video  [ ] Knee/Hip Prosthesis Review  [X] Walker Management/Safety [X] Adaptive Equipment as Needed         Interdisciplinary Collaboration:   · Physical Therapist  · Occupational Therapist  · Registered Nurse     After treatment position/precautions:   · Up in chair  · Bed/Chair-wheels locked  · Call light within reach  · RN notified  · LEs reclined     Compliance with Program/Exercises: Will assess as treatment progresses. Recommendations/Intent for next treatment session:  Treatment next visit will focus on increasing Ms. Neal's independence with bed mobility, transfers, self care, functional mobility, self care, modalities for pain, and patient education.        Total Treatment Duration:  OT Patient Time In/Time Out  Time In: 0930  [facility]

## 2017-08-30 NOTE — PROGRESS NOTES
Problem: Mobility Impaired (Adult and Pediatric)  Goal: *Acute Goals and Plan of Care (Insert Text)  GOALS (1-4 days):  (1.)Ms. Neal will move from supine to sit and sit to supine in bed with INDEPENDENT. (2.)Ms. Neal will transfer from bed to chair and chair to bed with INDEPENDENT using the least restrictive device. (3.)Ms. Neal will ambulate with INDEPENDENT for 250 feet with the least restrictive device. (4.)Ms. Neal will ambulate up/down 3 steps with bilateral railing with MINIMAL ASSIST with no device. (5.)Ms. Neal will increase right knee ROM to 5°-80°.  ________________________________________________________________________________________________  Outcome: Progressing Towards Goal      PHYSICAL THERAPY JOINT CAMP TKA: INITIAL ASSESSMENT, TREATMENT DAY: DAY OF ASSESSMENT, AM 8/30/2017  INPATIENT: Hospital Day: 2  Payor: Debria Gaucher / Plan: Sosa Juarez / Product Type: PPO /      NAME/AGE/GENDER: Darvin Sims is a 78 y.o. female           PRIMARY DIAGNOSIS:  Unilateral primary osteoarthritis, right hip [M16.11]              Procedure(s) and Anesthesia Type:     * HIP ARTHROPLASTY TOTAL/ RIGHT - Spinal (Right)  ICD-10: Treatment Diagnosis:        · Pain in Right Hip (M25.551)  · Stiffness of Right Hip, Not elsewhere classified (M25.651)  · Difficulty in walking, Not elsewhere classified (R26.2)  · Other abnormalities of gait and mobility (R26.89)       ASSESSMENT:      Ms. Mekhi Alex presents status post right total hip replacement with decreased independence with bed mobility, transfers, gait, and therapeutic exercise. Therapy will maximize independence with functional mobility. Pt progressing with out of bed activity this AM.      This section established at most recent assessment   PROBLEM LIST (Impairments causing functional limitations):  1. Decreased Strength  2. Decreased Transfer Abilities  3. Decreased Ambulation Ability/Technique  4.  Decreased Balance  5. Increased Pain  6. Decreased Activity Tolerance  7. Decreased Flexibility/Joint Mobility    INTERVENTIONS PLANNED: (Benefits and precautions of physical therapy have been discussed with the patient.)  1. Bed Mobility  2. Gait Training  3. Home Exercise Program (HEP)  4. Therapeutic Exercise/Strengthening  5. Transfer Training  6. Range of Motion: active/assisted/passive  7. Therapeutic Activities  8. Group Therapy      TREATMENT PLAN: Frequency/Duration: Follow patient BID   to address above goals. Rehabilitation Potential For Stated Goals: GOOD      RECOMMENDED REHABILITATION/EQUIPMENT: (at time of discharge pending progress): Home Health: Physical Therapy. HISTORY:   History of Present Injury/Illness (Reason for Referral):  Decreased mobility ability  Past Medical History/Comorbidities:   Ms. Belkys Landis  has a past medical history of Arthritis; Asthma exacerbation; Bilateral arm fractures (2013); GERD (gastroesophageal reflux disease); Hiatal hernia; and Morbid obesity ([address] She also has no past medical history of Aneurysm ([address] Arrhythmia; Autoimmune disease ([address] CAD (coronary artery disease); Cancer ([address] Chronic kidney disease; Chronic obstructive pulmonary disease ([address] Chronic pain; Coagulation disorder ([address] Diabetes ([address] Difficult intubation; Endocarditis; Heart failure ([address] Hypertension; Liver disease; Malignant hyperthermia due to anesthesia; Nausea & vomiting; Pseudocholinesterase deficiency; Psychiatric disorder; PUD (peptic ulcer disease); Rheumatic fever; Seizures ([address] Stroke [facility]); Thromboembolus ([address] or Thyroid disease. Ms. Belkys Landis  has a past surgical history that includes hysterectomy (1960's); knee replacement (Left, 2005); knee replacement (Right, 2006); and orthopaedic (2013). Social History/Living Environment:      Prior Level of Function/Work/Activity:  Independent with ambulation and ADLs. Pt lives alone.    Number of Personal Factors/Comorbidities that affect the Plan of Care: 0: LOW COMPLEXITY   EXAMINATION:   Most Recent Physical Functioning:   Gross Assessment: Yes  Gross Assessment  AROM: Generally decreased, functional  Strength: Generally decreased, functional  Coordination: Generally decreased, functional  Tone: Normal  Sensation: Intact                        Bed Mobility  Supine to Sit: Minimum assistance     Transfers  Sit to Stand: Minimum assistance  Stand to Sit: Minimum assistance  Bed to Chair: Contact guard assistance     Balance  Sitting: Intact  Standing: Pull to stand; With support    Posture  Posture (WDL): Within defined limits           Weight Bearing Status  Right Side Weight Bearing: As tolerated  Distance (ft): 25 Feet (ft) (to bathroom and to bedside chair)  Ambulation - Level of Assistance: Minimal assistance  Assistive Device: Walker, rolling  Gait Abnormalities: Antalgic         Braces/Orthotics: none     Right Hip Cold  Type: Cold/ice packs       Body Structures Involved:  1. Bones  2. Joints  3. Muscles Body Functions Affected:  1. Neuromusculoskeletal  2. Movement Related Activities and Participation Affected:  1. Mobility   Number of elements that affect the Plan of Care: 1-2: LOW COMPLEXITY   CLINICAL PRESENTATION:   Presentation: Stable and uncomplicated: LOW COMPLEXITY   CLINICAL DECISION MAKIN[address] [place], [place] [id number] AM-PAC 6 Clicks   Basic Mobility Inpatient Short Form  How much difficulty does the patient currently have. .. Unable A Lot A Little None   1. Turning over in bed (including adjusting bedclothes, sheets and blankets)? [ ] 1   [ ] 2   [X] 3   [ ] 4   2. Sitting down on and standing up from a chair with arms ( e.g., wheelchair, bedside commode, etc.)   [ ] 1   [ ] 2   [X] 3   [ ] 4   3. Moving from lying on back to sitting on the side of the bed? [ ] 1   [ ] 2   [X] 3   [ ] 4   How much help from another person does the patient currently need. .. Total A Lot A Little None   4.   Moving to and from a bed to a chair (including a wheelchair)? [ ] 1   [ ] 2   [X] 3   [ ] 4   5. Need to walk in hospital room? [ ] 1   [ ] 2   [X] 3   [ ] 4   6. Climbing 3-5 steps with a railing? [ ] 1   [ ] 2   [X] 3   [ ] 4   © 2007, Trustees of [address] [place] [address] [address] [address], under license to [employer]. All rights reserved       Score:  Initial: 18 Most Recent: X (Date: -- )     Interpretation of Tool:  Represents activities that are increasingly more difficult (i.e. Bed mobility, Transfers, Gait). Score 24 23 22-20 19-15 14-10 9-7 6       Modifier CH CI CJ CK CL CM CN         · Mobility - Walking and Moving Around:               - CURRENT STATUS:    CK - 40%-59% impaired, limited or restricted               - GOAL STATUS:           CK - 40%-59% impaired, limited or restricted               - D/C STATUS:                       CK - 40%-59% impaired, limited or restricted  Payor: Davis Jennings / Plan: April Back / Product Type: PPO /       Medical Necessity:     · Skilled intervention continues to be required due to decreased mobility ability. Reason for Services/Other Comments:  · Patient continues to require skilled intervention due to decreased mobility ability. Use of outcome tool(s) and clinical judgement create a POC that gives a: Clear prediction of patient's progress: LOW COMPLEXITY                 TREATMENT:   (In addition to Assessment/Re-Assessment sessions the following treatments were rendered)      Pre-treatment Symptoms/Complaints:  none  Pain: Initial:   Pain Intensity 1: 0  Pain Location 1: Hip  Pain Orientation 1: Right  Post Session:  0      Therapeutic Exercise: (  15 minutes):  Exercises per grid below to improve mobility.       Assessment/Reassessment only, no treatment provided today        Date:  8/30/17 Date:    Date:      ACTIVITY/EXERCISE AM PM AM PM AM PM   GROUP THERAPY  [ ]  [ ]  [ ]  [ ]  [ ]  [ ]   Ankle Pumps 10             Quad Sets 10 Gluteal Sets 10             Hip ABd/ADduction 10             Straight Leg Raises               Knee Slides 10             Short Arc Quads               Long Arc Quads               Chair Slides                               B = bilateral; AA = active assistive; A = active; P = passive       Treatment/Session Assessment:         Response to Treatment:  Pt ready to get to bedside chair. Education:  [X] Home Exercises  [X] Fall Precautions  [ ] Hip Precautions [ ] D/C Instruction Review  [ ] Knee/Hip Prosthesis Review  [ ] Cathleen Goldberg Management/Safety [ ] Adaptive Equipment as Needed         Interdisciplinary Collaboration:   · Physical Therapist  · Occupational Therapist  · Registered Nurse     After treatment position/precautions:   · Up in chair  · Bed/Chair-wheels locked  · Bed in low position  · Call light within reach  · RN notified     Compliance with Program/Exercises: compliant most of the time. Recommendations/Intent for next treatment session:  Treatment next visit will focus on increasing Ms. Neal's independence with bed mobility, transfers, gait training, strength/ROM exercises, modalities for pain, and patient education.        Total Treatment Duration:  PT Patient Time In/Time Out  Time In: 0900  Time Out: 0930     Adam Haney, PT

## 2017-08-30 NOTE — PROGRESS NOTES
08/29/17 2015   Oxygen Therapy   O2 Sat (%) 97 %   Pulse via Oximetry 80 beats per minute   O2 Device Nasal cannula   O2 Flow Rate (L/min) 3 l/min   Patient achieved    1500      Ml/sec on IS. Patient encouraged to do every hour while awake-patient agreed and demonstrated. No shortness of breath or distress noted. BS are clear b/l. Continuous sat monitor # 6 placed on patient QHS.  Patient placed on o2 at 3 L QHS per Sukhdev Burris NP

## 2017-08-30 NOTE — PROGRESS NOTES
Alert and oriented x3. States she feels great. No NV deficits noted. Right hip bandage is intact.  Patient is waiting for breakfast.

## 2017-08-30 NOTE — PROGRESS NOTES
08/30/17 0857   Oxygen Therapy   O2 Sat (%) 96 %   Pulse via Oximetry 76 beats per minute   O2 Device Nasal cannula   O2 Flow Rate (L/min) 3 l/min  (Decreased to room air )   FIO2 (%) 32 %   Home med (advair) administered.

## 2017-08-31 VITALS
WEIGHT: 212 LBS | RESPIRATION RATE: 20 BRPM | TEMPERATURE: 98.7 F | DIASTOLIC BLOOD PRESSURE: 75 MMHG | HEART RATE: 74 BPM | OXYGEN SATURATION: 96 % | SYSTOLIC BLOOD PRESSURE: 124 MMHG | BODY MASS INDEX: 35.32 KG/M2 | HEIGHT: 65 IN

## 2017-08-31 LAB — HGB BLD-MCNC: 11.3 G/DL (ref 11.7–15.4)

## 2017-08-31 PROCEDURE — 36415 COLL VENOUS BLD VENIPUNCTURE: CPT | Performed by: ORTHOPAEDIC SURGERY

## 2017-08-31 PROCEDURE — 97116 GAIT TRAINING THERAPY: CPT

## 2017-08-31 PROCEDURE — 74011250637 HC RX REV CODE- 250/637: Performed by: ORTHOPAEDIC SURGERY

## 2017-08-31 PROCEDURE — 97150 GROUP THERAPEUTIC PROCEDURES: CPT

## 2017-08-31 PROCEDURE — 97110 THERAPEUTIC EXERCISES: CPT

## 2017-08-31 PROCEDURE — 85018 HEMOGLOBIN: CPT | Performed by: ORTHOPAEDIC SURGERY

## 2017-08-31 RX ADMIN — SENNOSIDES AND DOCUSATE SODIUM 2 TABLET: 8.6; 5 TABLET ORAL at 08:21

## 2017-08-31 RX ADMIN — CELECOXIB 200 MG: 200 CAPSULE ORAL at 08:21

## 2017-08-31 RX ADMIN — ZAFIRLUKAST 20 MG: 20 TABLET, FILM COATED ORAL at 07:30

## 2017-08-31 RX ADMIN — ASPIRIN 81 MG: 81 TABLET, COATED ORAL at 08:21

## 2017-08-31 RX ADMIN — FLUTICASONE PROPIONATE AND SALMETEROL 1 PUFF: 250; 50 POWDER RESPIRATORY (INHALATION) at 08:00

## 2017-08-31 RX ADMIN — HYDROCODONE BITARTRATE AND ACETAMINOPHEN 1 TABLET: 7.5; 325 TABLET ORAL at 14:13

## 2017-08-31 RX ADMIN — HYDROCODONE BITARTRATE AND ACETAMINOPHEN 1 TABLET: 7.5; 325 TABLET ORAL at 09:44

## 2017-08-31 NOTE — PROGRESS NOTES
Shift assessment complete. Pt resting in bed. Call light within reach, Bed low to ground and wheels locked. Pt able to dosi/plantar flex bilaterally with +2 pedal pulses. Neurovascular status WNL. Dressing is dry and intact. Voiding yellow clear urine. IS at bedside. No needs at this time.

## 2017-08-31 NOTE — PROGRESS NOTES
Problem: Mobility Impaired (Adult and Pediatric)  Goal: *Acute Goals and Plan of Care (Insert Text)  GOALS (1-4 days):  (1.)Ms. Neal will move from supine to sit and sit to supine in bed with INDEPENDENT. (2.)Ms. Neal will transfer from bed to chair and chair to bed with INDEPENDENT using the least restrictive device. (3.)Ms. Neal will ambulate with INDEPENDENT for 250 feet with the least restrictive device. (4.)Ms. Neal will ambulate up/down 3 steps with bilateral railing with MINIMAL ASSIST with no device. (5.)Ms. Neal will increase right knee ROM to 5°-80°.  ________________________________________________________________________________________________  Outcome: Progressing Towards Goal      PHYSICAL THERAPY JOINT CAMP TKA: Daily Note and AM 8/31/2017  INPATIENT: Hospital Day: 3  Payor: David Méndez / Plan: Len Gibson / Product Type: PPO /      NAME/AGE/GENDER: Bety Sal is a 78 y.o. female           PRIMARY DIAGNOSIS:  Unilateral primary osteoarthritis, right hip [M16.11]              Procedure(s) and Anesthesia Type:     * HIP ARTHROPLASTY TOTAL/ RIGHT - Spinal (Right)  ICD-10: Treatment Diagnosis:        · Pain in Right Hip (M25.551)  · Stiffness of Right Hip, Not elsewhere classified (M25.651)  · Difficulty in walking, Not elsewhere classified (R26.2)  · Other abnormalities of gait and mobility (R26.89)       ASSESSMENT:      Ms. Moo Alvarado presents status post right total hip replacement with decreased independence with bed mobility, transfers, gait, and therapeutic exercise. Therapy will maximize independence with functional mobility. She is making slow, yet steady progress. She plans on going home later today. I suggest supervision for first few days to insure safe transition to home environment. This section established at most recent assessment   PROBLEM LIST (Impairments causing functional limitations):  1. Decreased Strength  2.  Decreased Transfer Abilities  3. Decreased Ambulation Ability/Technique  4. Decreased Balance  5. Increased Pain  6. Decreased Activity Tolerance  7. Decreased Flexibility/Joint Mobility    INTERVENTIONS PLANNED: (Benefits and precautions of physical therapy have been discussed with the patient.)  1. Bed Mobility  2. Gait Training  3. Home Exercise Program (HEP)  4. Therapeutic Exercise/Strengthening  5. Transfer Training  6. Range of Motion: active/assisted/passive  7. Therapeutic Activities  8. Group Therapy      TREATMENT PLAN: Frequency/Duration: Follow patient BID   to address above goals. Rehabilitation Potential For Stated Goals: GOOD      RECOMMENDED REHABILITATION/EQUIPMENT: (at time of discharge pending progress): Home Health: Physical Therapy. HISTORY:   History of Present Injury/Illness (Reason for Referral):  Decreased mobility ability/ S/P R RAVI  Past Medical History/Comorbidities:   Ms. Irving Carpenter  has a past medical history of Arthritis; Asthma exacerbation; Bilateral arm fractures (2013); GERD (gastroesophageal reflux disease); Hiatal hernia; and Morbid obesity ([address] She also has no past medical history of Aneurysm ([address] Arrhythmia; Autoimmune disease ([address] CAD (coronary artery disease); Cancer ([address] Chronic kidney disease; Chronic obstructive pulmonary disease ([address] Chronic pain; Coagulation disorder ([address] Diabetes ([address] Difficult intubation; Endocarditis; Heart failure ([address] Hypertension; Liver disease; Malignant hyperthermia due to anesthesia; Nausea & vomiting; Pseudocholinesterase deficiency; Psychiatric disorder; PUD (peptic ulcer disease); Rheumatic fever; Seizures ([address] Stroke [facility]); Thromboembolus ([address] or Thyroid disease. Ms. Irving Carpenter  has a past surgical history that includes hysterectomy (1960's); knee replacement (Left, 2005); knee replacement (Right, 2006); and orthopaedic (2013).   Social History/Living Environment:   Home Environment: Private residence  # Steps to Enter: 1  One/Two Story Residence: One story  Living Alone: No  Support Systems: Family member(s)  Patient Expects to be Discharged to[de-identified] Private residence  Current DME Used/Available at Home: Walker, rolling  Prior Level of Function/Work/Activity:  Independent with ambulation and ADLs. Pt lives alone. Number of Personal Factors/Comorbidities that affect the Plan of Care: 0: LOW COMPLEXITY   EXAMINATION:   Most Recent Physical Functioning:                                     Transfers  Sit to Stand: Contact guard assistance  Stand to Sit: Contact guard assistance (cues to sit slowly)     Balance  Sitting: Intact  Standing: With support                Weight Bearing Status  Right Side Weight Bearing: As tolerated  Distance (ft): 75 Feet (ft) (x 2)  Ambulation - Level of Assistance: Contact guard assistance  Assistive Device: Walker, rolling  Speed/Robyn: Pace decreased (<100 feet/min); Slow  Step Length: Left shortened  Stance: Right decreased  Gait Abnormalities: Antalgic; Step to gait  Stairs - Level of Assistance:  (up/down 4 steps with rails and close CGA)  Stand Pivot Transfers:  (close CGA with RW)  Interventions: Safety awareness training;Verbal cues      Braces/Orthotics: none     Right Hip Cold  Type: Cold/ice packs       Body Structures Involved:  1. Bones  2. Joints  3. Muscles Body Functions Affected:  1. Neuromusculoskeletal  2. Movement Related Activities and Participation Affected:  1. Mobility   Number of elements that affect the Plan of Care: 1-2: LOW COMPLEXITY   CLINICAL PRESENTATION:   Presentation: Stable and uncomplicated: LOW COMPLEXITY   CLINICAL DECISION MAKIN [facility] [address] [address] AM-PAC 6 Clicks   Basic Mobility Inpatient Short Form  How much difficulty does the patient currently have. .. Unable A Lot A Little None   1. Turning over in bed (including adjusting bedclothes, sheets and blankets)? [ ] 1   [ ] 2   [X] 3   [ ] 4   2.   Sitting down on and standing up from a chair with arms ( e.g., wheelchair, bedside commode, etc.)   [ ] 1   [ ] 2   [X] 3   [ ] 4   3. Moving from lying on back to sitting on the side of the bed? [ ] 1   [ ] 2   [X] 3   [ ] 4   How much help from another person does the patient currently need. .. Total A Lot A Little None   4. Moving to and from a bed to a chair (including a wheelchair)? [ ] 1   [ ] 2   [X] 3   [ ] 4   5. Need to walk in hospital room? [ ] 1   [ ] 2   [X] 3   [ ] 4   6. Climbing 3-5 steps with a railing? [ ] 1   [ ] 2   [X] 3   [ ] 4   © 2007, Trustees of [facility] [employer], under license to [employer]. All rights reserved       Score:  Initial: 18 Most Recent: 18 (Date:8/31/17 )     Interpretation of Tool:  Represents activities that are increasingly more difficult (i.e. Bed mobility, Transfers, Gait). Score 24 23 22-20 19-15 14-10 9-7 6       Modifier CH CI CJ CK CL CM CN         · Mobility - Walking and Moving Around:               - CURRENT STATUS:    CK - 40%-59% impaired, limited or restricted               - GOAL STATUS:           CK - 40%-59% impaired, limited or restricted               - D/C STATUS:                       CK - 40%-59% impaired, limited or restricted  Payor: Miladis Woodard / Plan: Judith Malik / Product Type: PPO /       Medical Necessity:     · Skilled intervention continues to be required due to decreased mobility ability. Reason for Services/Other Comments:  · Patient continues to require skilled intervention due to decreased mobility ability.    Use of outcome tool(s) and clinical judgement create a POC that gives a: Clear prediction of patient's progress: LOW COMPLEXITY                 TREATMENT:   (In addition to Assessment/Re-Assessment sessions the following treatments were rendered)      Pre-treatment Symptoms/Complaints: minimal pain in the hip  Pain: Initial:   Pain Intensity 1: 3  Pain Location 1: Hip  Pain Orientation 1: Right  Pain Intervention(s) 1: Ambulation/Increased Activity, Cold pack, Elevation, Exercise, Repositioned  Post Session:  3      Therapeutic Exercise: (30 Minutes (group) ):  Exercises per grid below to improve mobility. Gait Training (15 Minutes ):  Gait training to improve and/or restore physical functioning as related to mobility. Assistive Device: Walker, rolling  Ambulation - Level of Assistance: Contact guard assistance  Distance (ft): 75 Feet (ft) (x 2)  Stairs - Level of Assistance:  (up/down 4 steps with rails and close CGA)  Interventions: Safety awareness training, Verbal cues  Duration: 15 Minutes            Date:  8/30/17 Date:  8/31/17  Date:      ACTIVITY/EXERCISE AM PM AM PM AM PM   GROUP THERAPY  [ ]  Tammy ]  x[ ]  [ ]  [ ]  [ ]   Ankle Pumps 10  15  20         Quad Sets 10  15  20         Gluteal Sets 10  15  20         Hip ABd/ADduction 10  15  20         Straight Leg Raises               Knee Slides 10  15  20         Short Arc Quads   15  20         Long Arc Quads   15   20         Chair Slides                               B = bilateral; AA = active assistive; A = active; P = passive       Treatment/Session Assessment:         Response to Treatment:  Tolerated well. Fatigues easily     Education:  [X] Home Exercises  [X] Fall Precautions  Tammy ] Hip Precautions [x ] D/C Instruction Review  Tammy ] Knee/Hip Prosthesis Review  [x ] Walker Management/Safety [ ] Adaptive Equipment as Needed         Interdisciplinary Collaboration:   · Physical Therapist, Registered Nurse and Rehabilitation Attendant     After treatment position/precautions:   · Up in chair, Bed/Chair-wheels locked, Call light within reach and RN notified     Compliance with Program/Exercises: compliant most of the time. Recommendations/Intent for next treatment session:  Treatment next visit will focus on increasing Ms. Neal's independence with bed mobility, transfers, gait training, strength/ROM exercises, modalities for pain, and patient education. Total Treatment Duration:  PT Patient Time In/Time Out  Time In: 1030  Time Out: 145 Tiana Fisher

## 2017-08-31 NOTE — PROGRESS NOTES
Problem: Mobility Impaired (Adult and Pediatric)  Goal: *Acute Goals and Plan of Care (Insert Text)  GOALS (1-4 days):  (1.)Ms. Neal will move from supine to sit and sit to supine in bed with INDEPENDENT. (2.)Ms. Neal will transfer from bed to chair and chair to bed with INDEPENDENT using the least restrictive device. (3.)Ms. Neal will ambulate with INDEPENDENT for 250 feet with the least restrictive device. (4.)Ms. Neal will ambulate up/down 3 steps with bilateral railing with MINIMAL ASSIST with no device. (5.)Ms. Neal will increase right knee ROM to 5°-80°.  ________________________________________________________________________________________________  Outcome: Progressing Towards Goal      PHYSICAL THERAPY JOINT CAMP TKA: Daily Note and PM 8/31/2017  INPATIENT: Hospital Day: 3  Payor: Bri Quintana / Plan: Jang Shows / Product Type: PPO /      NAME/AGE/GENDER: Bruno Amezquita is a 78 y.o. female           PRIMARY DIAGNOSIS:  Unilateral primary osteoarthritis, right hip [M16.11]              Procedure(s) and Anesthesia Type:     * HIP ARTHROPLASTY TOTAL/ RIGHT - Spinal (Right)  ICD-10: Treatment Diagnosis:        · Pain in Right Hip (M25.551)  · Stiffness of Right Hip, Not elsewhere classified (M25.651)  · Difficulty in walking, Not elsewhere classified (R26.2)  · Other abnormalities of gait and mobility (R26.89)       ASSESSMENT:      Ms. Katia Gilmore presents status post right total hip replacement with decreased independence with bed mobility, transfers, gait, and therapeutic exercise. Therapy will maximize independence with functional mobility. She is doing better this. More fluid movement      This section established at most recent assessment   PROBLEM LIST (Impairments causing functional limitations):  1. Decreased Strength  2. Decreased Transfer Abilities  3. Decreased Ambulation Ability/Technique  4. Decreased Balance  5. Increased Pain  6.  Decreased Activity Tolerance  7. Decreased Flexibility/Joint Mobility    INTERVENTIONS PLANNED: (Benefits and precautions of physical therapy have been discussed with the patient.)  1. Bed Mobility  2. Gait Training  3. Home Exercise Program (HEP)  4. Therapeutic Exercise/Strengthening  5. Transfer Training  6. Range of Motion: active/assisted/passive  7. Therapeutic Activities  8. Group Therapy      TREATMENT PLAN: Frequency/Duration: Follow patient BID   to address above goals. Rehabilitation Potential For Stated Goals: GOOD      RECOMMENDED REHABILITATION/EQUIPMENT: (at time of discharge pending progress): Home Health: Physical Therapy. HISTORY:   History of Present Injury/Illness (Reason for Referral):  Decreased mobility ability/ S/P R RAVI  Past Medical History/Comorbidities:   Ms. Alvarado Mcmahan  has a past medical history of Arthritis; Asthma exacerbation; Bilateral arm fractures (2013); GERD (gastroesophageal reflux disease); Hiatal hernia; and Morbid obesity ([address] She also has no past medical history of Aneurysm ([address] Arrhythmia; Autoimmune disease ([address] CAD (coronary artery disease); Cancer ([address] Chronic kidney disease; Chronic obstructive pulmonary disease ([address] Chronic pain; Coagulation disorder ([address] Diabetes ([address] Difficult intubation; Endocarditis; Heart failure ([address] Hypertension; Liver disease; Malignant hyperthermia due to anesthesia; Nausea & vomiting; Pseudocholinesterase deficiency; Psychiatric disorder; PUD (peptic ulcer disease); Rheumatic fever; Seizures ([address] Stroke [facility]); Thromboembolus ([address] or Thyroid disease. Ms. Alvarado Mcmahan  has a past surgical history that includes hysterectomy (1960's); knee replacement (Left, 2005); knee replacement (Right, 2006); and orthopaedic (2013).   Social History/Living Environment:   Home Environment: Private residence  # Steps to Enter: 1  One/Two Story Residence: One story  Living Alone: No  Support Systems: Family member(s)  Patient Expects to be Discharged to[de-identified] Private residence  Current DME Used/Available at Home: Walker, rolling  Prior Level of Function/Work/Activity:  Independent with ambulation and ADLs. Pt lives alone. Number of Personal Factors/Comorbidities that affect the Plan of Care: 0: LOW COMPLEXITY   EXAMINATION:   Most Recent Physical Functioning:                                     Transfers  Sit to Stand: Contact guard assistance  Stand to Sit: Contact guard assistance     Balance  Sitting: Intact  Standing: With support      Assisted her to the restroom. Able to void and clean vamshi area on her own. Min assist to pull pants back up             Weight Bearing Status  Right Side Weight Bearing: As tolerated  Distance (ft): 75 Feet (ft) (then 20' more)  Ambulation - Level of Assistance: Contact guard assistance  Assistive Device: Walker, rolling  Speed/Robyn: Pace decreased (<100 feet/min)  Step Length: Left shortened  Stance: Right decreased  Gait Abnormalities: Antalgic; Step to gait  Stairs - Level of Assistance:  (up/down 4 steps with rails and close CGA)  Stand Pivot Transfers: Contact guard assistance  Interventions: Safety awareness training;Verbal cues      Braces/Orthotics: none     Right Hip Cold  Type: Cold/ice packs       Body Structures Involved:  1. Bones  2. Joints  3. Muscles Body Functions Affected:  1. Neuromusculoskeletal  2. Movement Related Activities and Participation Affected:  1. Mobility   Number of elements that affect the Plan of Care: 1-2: LOW COMPLEXITY   CLINICAL PRESENTATION:   Presentation: Stable and uncomplicated: LOW COMPLEXITY   CLINICAL DECISION MAKIN [facility] [address] [id number] AM-PAC 6 Clicks   Basic Mobility Inpatient Short Form  How much difficulty does the patient currently have. .. Unable A Lot A Little None   1. Turning over in bed (including adjusting bedclothes, sheets and blankets)? [ ] 1   [ ] 2   [X] 3   [ ] 4   2.   Sitting down on and standing up from a chair with arms ( e.g., wheelchair, bedside commode, etc.)   [ ] 1   [ ] 2   [X] 3   [ ] 4   3. Moving from lying on back to sitting on the side of the bed? [ ] 1   [ ] 2   [X] 3   [ ] 4   How much help from another person does the patient currently need. .. Total A Lot A Little None   4. Moving to and from a bed to a chair (including a wheelchair)? [ ] 1   [ ] 2   [X] 3   [ ] 4   5. Need to walk in hospital room? [ ] 1   [ ] 2   [X] 3   [ ] 4   6. Climbing 3-5 steps with a railing? [ ] 1   [ ] 2   [X] 3   [ ] 4   © 2007, Trustees of [address] [place] [address] [address] [address], under license to [employer]. All rights reserved       Score:  Initial: 18 Most Recent: 18 (Date:8/31/17 )     Interpretation of Tool:  Represents activities that are increasingly more difficult (i.e. Bed mobility, Transfers, Gait). Score 24 23 22-20 19-15 14-10 9-7 6       Modifier CH CI CJ CK CL CM CN         · Mobility - Walking and Moving Around:               - CURRENT STATUS:    CK - 40%-59% impaired, limited or restricted               - GOAL STATUS:           CK - 40%-59% impaired, limited or restricted               - D/C STATUS:                       CK - 40%-59% impaired, limited or restricted  Payor: Lupe Paniagua / Plan: Salina Smith / Product Type: PPO /       Medical Necessity:     · Skilled intervention continues to be required due to decreased mobility ability. Reason for Services/Other Comments:  · Patient continues to require skilled intervention due to decreased mobility ability.    Use of outcome tool(s) and clinical judgement create a POC that gives a: Clear prediction of patient's progress: LOW COMPLEXITY                 TREATMENT:   (In addition to Assessment/Re-Assessment sessions the following treatments were rendered)      Pre-treatment Symptoms/Complaints: minimal pain  Pain: Initial:   Pain Intensity 1: 2  Pain Location 1: Hip  Pain Orientation 1: Right  Pain Intervention(s) 1: Ambulation/Increased Activity, Cold pack, Elevation, Exercise, Repositioned  Post Session: 2      Therapeutic Exercise: (15 Minutes ):  Exercises per grid below to improve mobility. Gait Training (15 Minutes ):  Gait training to improve and/or restore physical functioning as related to mobility. Assistive Device: Walker, rolling  Ambulation - Level of Assistance: Contact guard assistance  Distance (ft): 75 Feet (ft) (then 20' more)  Stairs - Level of Assistance:  (up/down 4 steps with rails and close CGA)  Interventions: Safety awareness training, Verbal cues  Duration: 15 Minutes            Date:  8/30/17 Date:  8/31/17  Date:      ACTIVITY/EXERCISE AM PM AM PM AM PM   GROUP THERAPY  [ ]  Marciano ]  x[ ]  [ ]  [ ]  [ ]   Ankle Pumps 10  15  20 20        Quad Sets 10  15  20  20       Gluteal Sets 10  15  20  20       Hip ABd/ADduction 10  15  20  20       Straight Leg Raises               Knee Slides 10  15  20  20       Short Arc Quads   15  20  20       Long Arc Quads   15   20  20       Chair Slides                               B = bilateral; AA = active assistive; A = active; P = passive       Treatment/Session Assessment:         Response to Treatment:  Better this pm     Education:  [X] Home Exercises  [X] Fall Precautions  Marciano ] Hip Precautions [x ] D/C Instruction Review  [ ] Knee/Hip Prosthesis Review  [x ] Walker Management/Safety [ ] Adaptive Equipment as Needed         Interdisciplinary Collaboration:   · Physical Therapist and Registered Nurse     After treatment position/precautions:   · Up in chair, Bed/Chair-wheels locked, Call light within reach and RN notified     Compliance with Program/Exercises: compliant most of the time. Recommendations/Intent for next treatment session:  Treatment next visit will focus on increasing Ms. Neal's independence with bed mobility, transfers, gait training, strength/ROM exercises, modalities for pain, and patient education.        Total Treatment Duration:  PT Patient Time In/Time Out  Time In: 1300  Time Out: [address]

## 2017-08-31 NOTE — PROGRESS NOTES
I have reviewed discharge instructions with the patient. The patient verbalized understanding. Prescription for Norco given and explained. [facility]

## 2017-08-31 NOTE — DISCHARGE INSTRUCTIONS
[id number] [facility]   Patient Discharge Instructions    Darvin Sims / [id number] : 1940    Admitted 2017 Discharged: 2017     IF YOU HAVE ANY PROBLEMS ONCE YOU ARE AT HOME CALL THE FOLLOWING NUMBERS:   Main office number: [phone]    Take Home Medications     · It is important that you take the medication exactly as they are prescribed. · Keep your medication in the bottles provided by the pharmacist and keep a list of the medication names, dosages, and times to be taken in your wallet. · Do not take other medications without consulting your doctor. What to do at [address] your prehospital diet. If you have excessive nausea or vomitting call your doctor's office     Home Physical Therapy is arranged. Use rolling walker when walking. Use Jin Hose stockings until we see you in the office for your follow up appointment with Dr. Ilya Carson. Patients who have had a joint replacement should not drive until you are seen for your follow up appointment by Dr. Ilya Carson. When to Call    - Call if you have a temperature greater then 101  - Unable to keep food down  - Loose control of your bladder or bowel function  - Are unable to bear any weight   - Need a pain medication refill       DISCHARGE SUMMARY from Nurse    The following personal items collected during your admission are returned to you:   Dental Appliance: Dental Appliances: Uppers, Lowers, With patient  Vision: Visual Aid: Glasses  Hearing Aid:   na  Jewelry: Jewelry: None  Clothing: Clothing: Footwear, Pants, Shirt  Other Valuables:  Other Valuables: Cell Phone, [address] sent to safe:   na    PATIENT INSTRUCTIONS:    After general anesthesia or intravenous sedation, for 24 hours or while taking prescription Narcotics:  · Limit your activities  · Do not drive and operate hazardous machinery  · Do not make important personal or business decisions  · Do  not drink alcoholic beverages  · If you have not urinated within 8 hours after discharge, please contact your surgeon on call. Report the following to your surgeon:  · Excessive pain, swelling, redness or odor of or around the surgical area  · Temperature over 101  · Nausea and vomiting lasting longer than 4 hours or if unable to take medications  · Any signs of decreased circulation or nerve impairment to extremity: change in color, persistent  numbness, tingling, coldness or increase pain  · Follow hip precautions @ all times! · Any questions, call office @ [phone]      Keep scheduled follow up appointment. If need to change, call office @ [phone]. *  Please give a list of your current medications to your Primary Care Provider. *  Please update this list whenever your medications are discontinued, doses are      changed, or new medications (including over-the-counter products) are added. *  Please carry medication information at all times in case of emergency situations. Hip Replacement Surgery: What to Expect at Home  Your Recovery  Hip replacement surgery replaces the worn parts of your hip joint. When you leave the hospital, you will probably be walking with crutches or a walker. You may be able to climb a few stairs and get in and out of bed and chairs. But you will need someone to help you at home for the next few weeks or until you have more energy and can move around better. If there is no one to help you at home, you may go to a rehabilitation center or long-term care center. You will go home with a bandage and stitches or staples. You can remove the bandage when your doctor tells you to. Your doctor will remove your stitches or staples 10 days to 3 weeks after your surgery. You may still have some mild pain, and the area may be swollen for 3 to 4 months after surgery. Your doctor will give you medicine for the pain.   You will continue the rehabilitation program (rehab) you started in the hospital. The better you do with your rehab exercises, the sooner you will get your strength and movement back. Most people are able to return to work 4 weeks to 4 months after surgery. This care sheet gives you a general idea about how long it will take for you to recover. But each person recovers at a different pace. Follow the steps below to get better as quickly as possible. How can you care for yourself at home? Activity  · Your doctor may not want your affected leg to cross the center of your body toward the other leg. If so, your therapist may suggest these ideas:  ¨ Do not cross your legs. ¨ Be very careful as you get in or out of bed or a car, so your leg does not cross that imaginary line in the middle of your body. · Rest when you feel tired. You may take a nap, but do not stay in bed all day. · Work with your physical therapist to learn the best way to exercise. You may be able to take frequent, short walks using crutches or a walker. You will probably have to use crutches or a walker for at least 4 to 6 weeks. · Your doctor may advise you to stay away from activities that put stress on the joint. This includes sports such as tennis, football, and jogging. · Do not sit for longer than 30 to 45 minutes at a time. When you sit, use chairs with arms, and do not sit in low chairs. · Do not bend over more than 90 degrees (like the angle in a letter \"L\"). · Sleep on your back with your legs slightly apart or on your side with a pillow between your knees for about 6 weeks or as your doctor tells you. Do not sleep on your stomach or affected leg. · You may need to take sponge baths until your stitches or staples have been removed. You will probably be able to shower 24 hours after they are removed. · Ask your doctor when you can drive again. · Most people are able to return to work 4 weeks to 4 months after surgery. · Ask your doctor when it is okay for you to have sex.   Diet  · By the time you leave the hospital, you will probably be eating your normal diet. If your stomach is upset, try bland, low-fat foods like plain rice, broiled chicken, toast, and yogurt. Your doctor may recommend that you take iron and vitamin supplements. · Drink plenty of fluids (unless your doctor tells you not to). · Eat healthy foods, and watch your portion sizes. Try to stay at your ideal weight. Too much weight puts more stress on your new hip joint. · You may notice that your bowel movements are not regular right after your surgery. This is common. Try to avoid constipation and straining with bowel movements. You may want to take a fiber supplement every day. If you have not had a bowel movement after a couple of days, ask your doctor about taking a mild laxative. Medicines  · Your doctor will tell you if and when you can restart your medicines. He or she will also give you instructions about taking any new medicines. · If you take blood thinners, such as warfarin (Coumadin), clopidogrel (Plavix), or aspirin, be sure to talk to your doctor. He or she will tell you if and when to start taking those medicines again. Make sure that you understand exactly what your doctor wants you to do. · Your doctor may give you a blood-thinning medicine to prevent blood clots. If you take a blood thinner, be sure you get instructions about how to take your medicine safely. Blood thinners can cause serious bleeding problems. This medicine could be in pill form or as a shot (injection). If a shot is necessary, your doctor will tell you how to do this. · Be safe with medicines. Take pain medicines exactly as directed. ¨ If the doctor gave you a prescription medicine for pain, take it as prescribed. ¨ If you are not taking a prescription pain medicine, ask your doctor if you can take an over-the-counter medicine. · If you think your pain medicine is making you sick to your stomach:  ¨ Take your medicine after meals (unless your doctor has told you not to).   ¨ Ask your doctor for a different pain medicine. · If your doctor prescribed antibiotics, take them as directed. Do not stop taking them just because you feel better. You need to take the full course of antibiotics. Incision care  · You will have a bandage over the cut (incision) and staples or stitches. Follow your doctor's instructions on when to take the bandage off. Giving the incision air will help it heal.  · Your doctor will remove the staples or stitches 10 days to 3 weeks after the surgery and replace them with strips of tape. Leave the strips on for a week or until they fall off. Exercise  · Your rehab program will include a number of exercises to do. Always do them as your therapist tells you. Ice and elevation  · For pain, put ice or a cold pack on the area for 10 to 20 minutes at a time. Put a thin cloth between the ice and your skin. · Your ankle may swell for about 3 months. Prop up your ankle when you ice it or anytime you sit or lie down. Try to keep it above the level of your heart. This will help reduce swelling. Other instructions  · Continue to wear your support stockings as your doctor says. These help to prevent blood clots. The length of time that you will have to wear them depends on your activity level and the amount of swelling you have. Most people wear these stockings for 4 to 6 weeks after surgery. · Wear medical alert jewelry that says you may need antibiotics before any procedure, including dental work. You can buy this at most drugstores. Preventing falls is also very important. To prevent falls:  · Arrange furniture so that you will not trip on it. · Get rid of throw rugs, and move electrical cords out of the way. · Walk only in areas with plenty of light. · Put grab bars in showers and bathtubs. · Avoid icy or snowy sidewalks. · Wear shoes with sturdy, flat soles. Follow-up care is a key part of your treatment and safety.  Be sure to make and go to all appointments, and call your doctor if you are having problems. It's also a good idea to know your test results and keep a list of the medicines you take. When should you call for help? Call 911 anytime you think you may need emergency care. For example, call if:  · You passed out (lost consciousness). · You have severe trouble breathing. · You have sudden chest pain and shortness of breath, or you cough up blood. Call your doctor now or seek immediate medical care if:  · You have signs that your hip may be dislocated, including:  ¨ Severe pain and not being able to stand. ¨ A crooked leg that looks like your hip is out of position. ¨ Not being able to bend or straighten your leg. · Your leg or foot is cool or pale or changes color. · You cannot feel or move your leg. · You have signs of a blood clot, such as:  ¨ Pain in your calf, back of the knee, thigh, or groin. ¨ Redness and swelling in your leg or groin. · Your incision comes open and begins to bleed, or the bleeding increases. · You feel like your heart is racing or beating irregularly. · You have signs of infection, such as:  ¨ Increased pain, swelling, warmth, or redness. ¨ Red streaks leading from the incision. ¨ Pus draining from the incision. ¨ A fever. Watch closely for changes in your health, and be sure to contact your doctor if:  · You do not have a bowel movement after taking a laxative. · You do not get better as expected. Where can you learn more? Go to http://sammi-urszula.info/. Enter H818 in the search box to learn more about \"Hip Replacement Surgery: What to Expect at Home. \"  Current as of: March 21, 2017  Content Version: 11.3  © 2723-7176 [employer]. Care instructions adapted under license by [employer] (which disclaims liability or warranty for this information).  If you have questions about a medical condition or this instruction, always ask your healthcare professional. Major Mora any warranty or liability for your use of this information. These are general instructions for a healthy lifestyle:    No smoking/ No tobacco products/ Avoid exposure to second hand smoke    Surgeon General's Warning:  Quitting smoking now greatly reduces serious risk to your health. Obesity, smoking, and sedentary lifestyle greatly increases your risk for illness    A healthy diet, regular physical exercise & weight monitoring are important for maintaining a healthy lifestyle    You may be retaining fluid if you have a history of heart failure or if you experience any of the following symptoms:  Weight gain of 3 pounds or more overnight or 5 pounds in a week, increased swelling in our hands or feet or shortness of breath while lying flat in bed. Please call your doctor as soon as you notice any of these symptoms; do not wait until your next office visit. Recognize signs and symptoms of STROKE:    F-face looks uneven    A-arms unable to move or move even    S-speech slurred or non-existent    T-time-call 911 as soon as signs and symptoms begin-DO NOT go       Back to bed or wait to see if you get better-TIME IS BRAIN. The discharge information has been reviewed with the patient. The patient verbalized understanding. Information obtained by :  I understand that if any problems occur once I am at home I am to contact my physician. I understand and acknowledge receipt of the instructions indicated above.                                                                                                                                            Physician's or R.N.'s Signature                                                                  Date/Time                                                                                                                                              Patient or Representative Signature                                                          Date/Time

## 2017-08-31 NOTE — PROGRESS NOTES
Orthopedic Joint Progress Note    2017  Admit Date: 2017  Admit Diagnosis: Unilateral primary osteoarthritis, right hip [M16.11]    2 Days Post-Op    Subjective:     Dinorah A Peskopos awake and alert    Review of Systems: Pertinent items are noted in HPI. Objective:     PT/OT:     PATIENT MOBILITY    Bed Mobility  Supine to Sit: Minimum assistance  Transfers  Sit to Stand: Minimum assistance  Stand to Sit: Minimum assistance  Bed to Chair: Contact guard assistance      Gait  Gait Abnormalities: Antalgic  Ambulation - Level of Assistance: Minimal assistance  Distance (ft): 75 Feet (ft) (walked distance twice)  Assistive Device: Walker, rolling   Weight Bearing Status  Right Side Weight Bearing: As tolerated        Vital Signs:    Blood pressure 136/71, pulse 80, temperature 96.9 °F (36.1 °C), resp. rate 18, height 5' 5\" (1.651 m), weight 96.2 kg (212 lb), SpO2 94 %, not currently breastfeeding.   Temp (24hrs), Av.1 °F (36.2 °C), Min:96 °F (35.6 °C), Max:97.9 °F (36.6 °C)      Pain Control:   Pain Assessment  Pain Scale 1: Numeric (0 - 10)  Pain Intensity 1: 0  Pain Onset 1: pos top  Pain Location 1: Hip  Pain Orientation 1: Right  Pain Description 1: Sore, Tightness, Constant  Pain Intervention(s) 1: Declines    Meds:  Current Facility-Administered Medications   Medication Dose Route Frequency    albuterol (PROVENTIL VENTOLIN) nebulizer solution 2.5 mg  2.5 mg Nebulization Q6H PRN    albuterol (PROVENTIL HFA, VENTOLIN HFA, PROAIR HFA) inhaler 2 Puff  2 Puff Inhalation Q6H PRN    pantoprazole (PROTONIX) tablet 40 mg  40 mg Oral ACB    zafirlukast (ACCOLATE) tablet 20 mg  20 mg Oral ACB&D    sodium chloride (NS) flush 5-10 mL  5-10 mL IntraVENous Q8H    sodium chloride (NS) flush 5-10 mL  5-10 mL IntraVENous PRN    celecoxib (CELEBREX) capsule 200 mg  200 mg Oral Q12H    HYDROmorphone (PF) (DILAUDID) injection 1 mg  1 mg IntraVENous Q3H PRN    naloxone (NARCAN) injection 0.2-0.4 mg 0.2-0.4 mg IntraVENous Q10MIN PRN    promethazine (PHENERGAN) tablet 25 mg  25 mg Oral Q6H PRN    diphenhydrAMINE (BENADRYL) capsule 25 mg  25 mg Oral Q4H PRN    senna-docusate (PERICOLACE) 8.6-50 mg per tablet 2 Tab  2 Tab Oral DAILY    zolpidem (AMBIEN) tablet 5 mg  5 mg Oral QHS PRN    aspirin delayed-release tablet 81 mg  81 mg Oral Q12H    HYDROcodone-acetaminophen (NORCO) 7.5-325 mg per tablet 1 Tab  1 Tab Oral Q4H PRN    ondansetron (ZOFRAN ODT) tablet 8 mg  8 mg Oral Q8H PRN    hydrALAZINE (APRESOLINE) 20 mg/mL injection 10 mg  10 mg IntraVENous Q6H PRN    fluticasone-salmeterol (ADVAIR) 250mcg-50mcg/puff  1 Puff Inhalation BID RT    HYDROcodone-acetaminophen (NORCO) 7.5-325 mg per tablet 2 Tab  2 Tab Oral Q4H PRN        LAB:    Lab Results   Component Value Date/Time    INR 0.9 08/08/2017 10:55 AM     Lab Results   Component Value Date/Time    HGB 11.3 08/31/2017 04:36 AM    HGB 12.3 08/30/2017 04:09 AM    HGB 13.2 08/29/2017 08:24 PM       Wound Head (Active)   Number of days:1676       Wound Wrist Right (Active)   Number of days:1673       Wound Hip Right (Active)   DRESSING STATUS Clean, dry, and intact 8/30/2017  8:00 PM   DRESSING TYPE Other (Comment) 8/30/2017  8:00 PM   Number of days:2             Physical Exam:  No significant changes    Assessment:      Principal Problem:    S/P total hip arthroplasty (8/30/2017)    Active Problems:    Osteoarthritis (8/29/2017)         Plan:     Continue PT/OT/Rehab  Consult: Rehab team including PT, OT, recreational therapy, and     Patient Expects to be Discharged to[de-identified] Private residence     Signed By: Louie Villa MD

## 2017-08-31 NOTE — PROGRESS NOTES
[address]  Face to Face Encounter    Patients Name: Alma Granados    YOB: 1940    Ordering Physician:  Kellen Barger    Primary Diagnosis: Unilateral primary osteoarthritis, right hip [M16.11]  S/p right RAVI    Date of Face to Face:   8-29-17                                 Face to Face Encounter findings are related to primary reason for home care:   yes. 1. I certify that the patient needs intermittent care as follows: physical therapy: gait/stair training    2. I certify that this patient is homebound, that is: 1) patient requires the use of a walker device, special transportation, or assistance of another to leave the home; or 2) patient's condition makes leaving the home medically contraindicated; and 3) patient has a normal inability to leave the home and leaving the home requires considerable and taxing effort. Patient may leave the home for infrequent and short duration for medical reasons, and occasional absences for non-medical reasons. Homebound status is due to the following functional limitations: Patient's ambulation limited secondary to severe pain and requires the use of an assistive device and the assistance of a caregiver for safe completion. Patient with strength and ROM deficits limiting ambulation endurance requiring the use of an assistive device and the assistance of a caregiver. Patient deemed temporarily homebound secondary to increased risk for infection when leaving home and going out into the community. 3. I certify that this patient is under my care and that I, or a nurse practitioner or  [id number], or clinical nurse specialist, or certified nurse midwife, working with me, had a Face-to-Face Encounter that meets the physician Face-to-Face Encounter requirements.   The following are the clinical findings from the [address] [place] [address] encounter that support the need for skilled services and is a summary of the encounter: see hospital chart        CARLOS Porter  8/31/2017      THE FOLLOWING TO BE COMPLETED BY THE COMMUNITY PHYSICIAN:    I concur with the findings described above from the F2F encounter that this patient is homebound and in need of a skilled service.     Certifying Physician: _____________________________________      Printed Certifying Physician Name: _____________________________________    Date: _________________

## 2017-09-01 ENCOUNTER — PATIENT OUTREACH (OUTPATIENT)
Dept: OTHER | Age: 77
End: 2017-09-01

## 2017-09-01 ENCOUNTER — HOME CARE VISIT (OUTPATIENT)
Dept: SCHEDULING | Facility: HOME HEALTH | Age: 77
End: 2017-09-01
Payer: COMMERCIAL

## 2017-09-01 PROCEDURE — G0151 HHCP-SERV OF PT,EA 15 MIN: HCPCS

## 2017-09-01 PROCEDURE — 400013 HH SOC

## 2017-09-01 NOTE — PROGRESS NOTES
Patient on report as eligible for Case Management. Unable to leave message on voicemail. Will attempt to contact again to offer [phone] services.

## 2017-09-02 VITALS
SYSTOLIC BLOOD PRESSURE: 170 MMHG | HEART RATE: 84 BPM | TEMPERATURE: 98.1 F | RESPIRATION RATE: 18 BRPM | DIASTOLIC BLOOD PRESSURE: 60 MMHG

## 2017-09-04 ENCOUNTER — HOME CARE VISIT (OUTPATIENT)
Dept: SCHEDULING | Facility: HOME HEALTH | Age: 77
End: 2017-09-04
Payer: COMMERCIAL

## 2017-09-04 PROCEDURE — G0157 HHC PT ASSISTANT EA 15: HCPCS

## 2017-09-05 ENCOUNTER — HOME CARE VISIT (OUTPATIENT)
Dept: HOME HEALTH SERVICES | Facility: HOME HEALTH | Age: 77
End: 2017-09-05
Payer: COMMERCIAL

## 2017-09-05 VITALS
TEMPERATURE: 97.5 F | HEART RATE: 72 BPM | SYSTOLIC BLOOD PRESSURE: 126 MMHG | RESPIRATION RATE: 18 BRPM | DIASTOLIC BLOOD PRESSURE: 60 MMHG

## 2017-09-06 ENCOUNTER — HOME CARE VISIT (OUTPATIENT)
Dept: SCHEDULING | Facility: HOME HEALTH | Age: 77
End: 2017-09-06
Payer: COMMERCIAL

## 2017-09-06 PROCEDURE — G0157 HHC PT ASSISTANT EA 15: HCPCS

## 2017-09-07 ENCOUNTER — HOME CARE VISIT (OUTPATIENT)
Dept: HOME HEALTH SERVICES | Facility: HOME HEALTH | Age: 77
End: 2017-09-07
Payer: COMMERCIAL

## 2017-09-07 VITALS
RESPIRATION RATE: 17 BRPM | HEART RATE: 76 BPM | DIASTOLIC BLOOD PRESSURE: 66 MMHG | TEMPERATURE: 97.4 F | SYSTOLIC BLOOD PRESSURE: 128 MMHG

## 2017-09-08 ENCOUNTER — HOME CARE VISIT (OUTPATIENT)
Dept: SCHEDULING | Facility: HOME HEALTH | Age: 77
End: 2017-09-08
Payer: COMMERCIAL

## 2017-09-08 PROCEDURE — G0157 HHC PT ASSISTANT EA 15: HCPCS

## 2017-09-09 VITALS
DIASTOLIC BLOOD PRESSURE: 62 MMHG | RESPIRATION RATE: 17 BRPM | SYSTOLIC BLOOD PRESSURE: 122 MMHG | HEART RATE: 74 BPM | TEMPERATURE: 97 F

## 2017-09-11 ENCOUNTER — HOME CARE VISIT (OUTPATIENT)
Dept: SCHEDULING | Facility: HOME HEALTH | Age: 77
End: 2017-09-11
Payer: COMMERCIAL

## 2017-09-11 PROCEDURE — G0157 HHC PT ASSISTANT EA 15: HCPCS

## 2017-09-12 VITALS
DIASTOLIC BLOOD PRESSURE: 70 MMHG | TEMPERATURE: 97.4 F | SYSTOLIC BLOOD PRESSURE: 138 MMHG | RESPIRATION RATE: 17 BRPM | HEART RATE: 80 BPM

## 2017-09-13 ENCOUNTER — HOME CARE VISIT (OUTPATIENT)
Dept: SCHEDULING | Facility: HOME HEALTH | Age: 77
End: 2017-09-13
Payer: COMMERCIAL

## 2017-09-14 ENCOUNTER — PATIENT OUTREACH (OUTPATIENT)
Dept: OTHER | Age: 77
End: 2017-09-14

## 2017-09-14 VITALS
DIASTOLIC BLOOD PRESSURE: 80 MMHG | SYSTOLIC BLOOD PRESSURE: 138 MMHG | TEMPERATURE: 97.3 F | RESPIRATION RATE: 17 BRPM | HEART RATE: 76 BPM

## 2017-09-14 RX ORDER — MELOXICAM 7.5 MG/1
TABLET ORAL
Refills: 1 | COMMUNITY
Start: 2017-06-19 | End: 2018-08-10

## 2017-09-14 NOTE — PROGRESS NOTES
Transition Of Care Note    Patient discharged from [facility] postop right hip arthroplasty. Medical History:     Past Medical History:   Diagnosis Date    Arthritis     Asthma exacerbation     hospitalization     Bilateral arm fractures     GERD (gastroesophageal reflux disease)     Hiatal hernia     Morbid obesity [facility])        Care Manager contacted the patient by telephone to perform post hospital discharge assessment. Verified  and zip code with patient as identifiers. Provided introduction to self, and explanation of the Nurse Care Manager role. Patient states she has been able to get in and out of her recliner at home and is mobile with her RW at home, no stairs. Her pain level is 0/10. She has completed her first postop appointment. She states her incision is healing well without signs of infection. Medication:   Performed medication reconciliation with patient, and patient verbalizes understanding of administration of home medications. There were no barriers to obtaining medications identified at this time. Support system:  patient and sister    Discharge Instructions :  Reviewed discharge instructions with patient. Patient verbalizes understanding of discharge instructions and follow-up care. Red Flags:  Call your doctor now or seek immediate medical care if:  · You have signs that your hip may be dislocated, including:  ¨ Severe pain and not being able to stand. ¨ A crooked leg that looks like your hip is out of position. ¨ Not being able to bend or straighten your leg. · Your leg or foot is cool or pale or changes color. · You cannot feel or move your leg. · You have signs of a blood clot, such as:  ¨ Pain in your calf, back of the knee, thigh, or groin. ¨ Redness and swelling in your leg or groin. · Your incision comes open and begins to bleed, or the bleeding increases. · You feel like your heart is racing or beating irregularly.   · You have signs of infection, such as:  ¨ Increased pain, swelling, warmth, or redness. ¨ Red streaks leading from the incision. ¨ Pus draining from the incision. ¨ A fever. Advance Care Planning:   Patient was offered the opportunity to discuss advance care planning:  yes     Does patient have an Advance Directive:  yes   If no, did you provide information on Caring Connections?  no     PCP/Specialist follow up: Patient scheduled to follow up with Dr. Akshat Lemon on Sept 12, 2017. Reviewed red flags with patient, and patient verbalizes understanding. Patient given an opportunity to ask questions. No other clinical/social/functional needs noted. The patient agrees to contact the PCP office for questions related to their healthcare. The patient expressed thanks, offered no additional questions and ended the call.

## 2017-09-15 ENCOUNTER — HOME CARE VISIT (OUTPATIENT)
Dept: SCHEDULING | Facility: HOME HEALTH | Age: 77
End: 2017-09-15
Payer: COMMERCIAL

## 2017-09-15 VITALS
HEART RATE: 72 BPM | TEMPERATURE: 97 F | RESPIRATION RATE: 17 BRPM | DIASTOLIC BLOOD PRESSURE: 70 MMHG | SYSTOLIC BLOOD PRESSURE: 122 MMHG

## 2017-09-15 PROCEDURE — G0157 HHC PT ASSISTANT EA 15: HCPCS

## 2017-09-18 ENCOUNTER — HOME CARE VISIT (OUTPATIENT)
Dept: SCHEDULING | Facility: HOME HEALTH | Age: 77
End: 2017-09-18
Payer: COMMERCIAL

## 2017-09-18 PROCEDURE — G0157 HHC PT ASSISTANT EA 15: HCPCS

## 2017-09-19 VITALS
TEMPERATURE: 98 F | DIASTOLIC BLOOD PRESSURE: 60 MMHG | SYSTOLIC BLOOD PRESSURE: 124 MMHG | HEART RATE: 74 BPM | RESPIRATION RATE: 16 BRPM

## 2017-09-20 ENCOUNTER — HOME CARE VISIT (OUTPATIENT)
Dept: SCHEDULING | Facility: HOME HEALTH | Age: 77
End: 2017-09-20
Payer: COMMERCIAL

## 2017-09-20 VITALS
TEMPERATURE: 96.9 F | BODY MASS INDEX: 33.78 KG/M2 | SYSTOLIC BLOOD PRESSURE: 118 MMHG | HEART RATE: 64 BPM | WEIGHT: 203 LBS | RESPIRATION RATE: 16 BRPM | DIASTOLIC BLOOD PRESSURE: 74 MMHG

## 2017-09-20 PROCEDURE — G0151 HHCP-SERV OF PT,EA 15 MIN: HCPCS

## 2017-09-21 ENCOUNTER — PATIENT OUTREACH (OUTPATIENT)
Dept: OTHER | Age: 77
End: 2017-09-21

## 2017-09-21 NOTE — PROGRESS NOTES
Care Manager contacted the patient by telephone for [facility] follow up. Verified  and zip code with patient as identifiers. Secondary to long term mobility impairment and living alone, patient has agreed to enroll in Health promotion case management with me. Patient states she is sitting on her front porch watching the birds this morning. She is no longer using her rolling walker but does use her cane. She is still unable to take her stairs alone, usually waits till her son comes by at night. She states she really has no pain and is performing her exercises from PT 2-3 times a day. She denies signs or symptoms of infection at the wound site, no drainage and the swelling and bruising \"is going away. \"  She states she has been able to cook, fix her own meals, get to the bathroom and return to sigifredo usual ADLs like washing her clothes without assistance. She seems very positive and upbeat on the phone. She is eager to return to work, she states, hopefully after my next postop visit with the Orthopedist.    Performed medication reconciliation with patient, and patient verbalizes understanding of administration of home medications. There were no barriers to obtaining medications identified at this time. Her follow up appointment is scheduled for Oct 5, 2017 with her Orthopedic surgeon. Patient given an opportunity to ask additional questions. No other clinical, social, or functional needs identified. Patient verbalized understanding of all information discussed. Patient received my contact information for any further questions, concerns, or needs.

## 2017-10-09 ENCOUNTER — PATIENT OUTREACH (OUTPATIENT)
Dept: OTHER | Age: 77
End: 2017-10-09

## 2017-10-12 NOTE — PROGRESS NOTES
Care Manager contacted the patient by telephone in follow up for continued Employee Care Management services. No answer and left discreet VM to return my call, including my contact information. Will call again tomorrow.

## 2017-10-17 ENCOUNTER — PATIENT OUTREACH (OUTPATIENT)
Dept: OTHER | Age: 77
End: 2017-10-17

## 2017-10-17 NOTE — PROGRESS NOTES
Patient identified as eligible for [address] [place] [address] services. Second telephone outreach attempted. Unable to leave a voicemail. Will send UTR letter.

## 2017-10-31 ENCOUNTER — PATIENT OUTREACH (OUTPATIENT)
Dept: OTHER | Age: 77
End: 2017-10-31

## 2017-10-31 NOTE — PROGRESS NOTES
Patient identified as eligible for [address] [place] [address] services. Follow up  telephone outreach attempted. Unable to leave a voicemail.

## 2018-02-21 ENCOUNTER — PATIENT OUTREACH (OUTPATIENT)
Dept: OTHER | Age: 78
End: 2018-02-21

## 2018-03-01 ENCOUNTER — PATIENT OUTREACH (OUTPATIENT)
Dept: OTHER | Age: 78
End: 2018-03-01

## 2018-03-01 NOTE — LETTER
Ms. Werner Dsouza  [email or web address] [address] Dear Ms. Ángel, My name is Liza Osman RN, Employee Care Manager for [employer], and I have been trying to reach you. The Employee Care Management [facility]) program is a free-of-charge, confidential service provided to our employees and their family members covered by the [facility]. I can help you with care transitions such as when you come home from the hospital, when help is needed to manage your disease, or when you need assistance coordinating services or appointments. [facility] now partners with [facility]. If you are a qualifying employee, you may receive an additional 10 wellness incentive points for every month of active participation with an Employee Care Manager. As healthcare providers, we know that patients do better when they have close follow up with a primary care provider (PCP). I can help you find one that is convenient to you and covered by your insurance. I can also help you understand any after visit instructions, such as what symptoms to watch out for, or any new or changed medications. We can work together using your preferred communication -- telephone, email, [employer]. If you do not have a [employer] account, I can help you request access. Our program is designed to provide you with the opportunity to have a [employer] care manager partner with you for your healthcare needs. Due to not being able to reach you, I am closing out the current program, but will remain available to you should you have any questions. Please contact me at the below number if I can provide you with assistance for any of the above services. Sincerely, Liza Osman RN, BSN  Employee Care Manager [email or web address]

## 2018-06-04 ENCOUNTER — APPOINTMENT (OUTPATIENT)
Dept: GENERAL RADIOLOGY | Age: 78
End: 2018-06-04
Payer: OTHER MISCELLANEOUS

## 2018-06-04 ENCOUNTER — HOSPITAL ENCOUNTER (EMERGENCY)
Age: 78
Discharge: HOME | End: 2018-06-04
Attending: EMERGENCY MEDICINE
Payer: OTHER MISCELLANEOUS

## 2018-06-04 ENCOUNTER — APPOINTMENT (OUTPATIENT)
Dept: CT IMAGING | Age: 78
End: 2018-06-04
Payer: OTHER MISCELLANEOUS

## 2018-06-04 VITALS
SYSTOLIC BLOOD PRESSURE: 215 MMHG | DIASTOLIC BLOOD PRESSURE: 93 MMHG | RESPIRATION RATE: 20 BRPM | HEART RATE: 65 BPM | OXYGEN SATURATION: 97 % | TEMPERATURE: 97.6 F

## 2018-06-04 DIAGNOSIS — T07.XXXA MULTIPLE CONTUSIONS: ICD-10-CM

## 2018-06-04 DIAGNOSIS — S09.90XA CLOSED HEAD INJURY, INITIAL ENCOUNTER: Primary | ICD-10-CM

## 2018-06-04 PROCEDURE — 70450 CT HEAD/BRAIN W/O DYE: CPT

## 2018-06-04 PROCEDURE — 72070 X-RAY EXAM THORAC SPINE 2VWS: CPT

## 2018-06-04 PROCEDURE — 72100 X-RAY EXAM L-S SPINE 2/3 VWS: CPT

## 2018-06-04 PROCEDURE — 73090 X-RAY EXAM OF FOREARM: CPT

## 2018-06-04 PROCEDURE — 99283 EMERGENCY DEPT VISIT LOW MDM: CPT | Performed by: EMERGENCY MEDICINE

## 2018-06-04 NOTE — DISCHARGE INSTRUCTIONS
Learning About a Closed Head Injury  What is a closed head injury? A closed head injury happens when your head gets hit hard. The strong force of the blow causes your brain to shake in your skull. This movement can cause the brain to bruise, swell, or tear. Sometimes nerves or blood vessels also get damaged. This can cause bleeding in or around the brain. A concussion is a type of closed head injury. What are the symptoms? If you have a mild concussion, you may have a mild headache or feel \"not quite right. \" These symptoms are common. They usually go away over a few days to 4 weeks. But sometimes after a concussion, you feel like you can't function as well as before the injury. And you have new symptoms. This is called postconcussive syndrome. You may:  · Find it harder to solve problems, think, concentrate, or remember. · Have headaches. · Have changes in your sleep patterns, such as not being able to sleep or sleeping all the time. · Have changes in your personality. · Not be interested in your usual activities. · Feel angry or anxious without a clear reason. · Lose your sense of taste or smell. · Be dizzy, lightheaded, or unsteady. It may be hard to stand or walk. How is a closed head injury treated? Any person who may have a concussion needs to see a doctor. Some people have to stay in the hospital to be watched. Others can go home safely. If you go home, follow your doctor's instructions. He or she will tell you if you need someone to watch you closely for the next 24 hours or longer. Rest is the best treatment. Get plenty of sleep at night. And try to rest during the day. · Avoid activities that are physically or mentally demanding. These include housework, exercise, and schoolwork. And don't play video games, send text messages, or use the computer. You may need to change your school or work schedule to be able to avoid these activities.   · Ask your doctor when it's okay to drive, ride a bike, or operate machinery. · Take an over-the-counter pain medicine, such as acetaminophen (Tylenol), ibuprofen (Advil, Motrin), or naproxen (Aleve). Be safe with medicines. Read and follow all instructions on the label. · Check with your doctor before you use any other medicines for pain. · Do not drink alcohol or use illegal drugs. They can slow recovery. They can also increase your risk of getting a second head injury. Follow-up care is a key part of your treatment and safety. Be sure to make and go to all appointments, and call your doctor if you are having problems. It's also a good idea to know your test results and keep a list of the medicines you take. Where can you learn more? Go to http://sammi-urszula.info/. Enter E235 in the search box to learn more about \"Learning About a Closed Head Injury. \"  Current as of: October 14, 2016  Content Version: 11.4  © 5347-6674 [employer]wise, [employer]. Care instructions adapted under license by [employer] (which disclaims liability or warranty for this information). If you have questions about a medical condition or this instruction, always ask your healthcare professional. [address] any warranty or liability for your use of this information.

## 2018-06-04 NOTE — ED PROVIDER NOTES
HPI Comments: 53-year-old female presents with complaints of a trip and fall. Patient states that she was walking patient's family when her foot hung up on the floor and she lost her balance and fell. She felt well today  No recent nausea, vomiting or diarrhea  Chest pain, dizziness or headaches    Patient is a 78 y.o. female presenting with fall. The history is provided by the patient. Fall   The accident occurred less than 1 hour ago. The fall occurred while walking. She fell from a height of ground level. She landed on hard floor. There was no blood loss. The point of impact was the right hip and right wrist. The pain is present in the right hip and right wrist. The pain is mild. She was not ambulatory at the scene. There was no entrapment after the fall. There was no drug use involved in the accident. There was no alcohol use involved in the accident. Pertinent negatives include no fever, no numbness, no abdominal pain, no vomiting, no headaches, no loss of consciousness, no tingling and no laceration. The risk factors include being elderly. The symptoms are aggravated by standing. She has tried nothing for the symptoms. Past Medical History:   Diagnosis Date    Arthritis     Asthma exacerbation     hospitalization 2004    Bilateral arm fractures 2013    GERD (gastroesophageal reflux disease)     Hiatal hernia     Morbid obesity ([address]        Past Surgical History:   Procedure Laterality Date    HX HYSTERECTOMY  1960's    HX KNEE REPLACEMENT Left 2005    HX KNEE REPLACEMENT Right 2006    HX ORTHOPAEDIC  2013    arm         Family History:   Problem Relation Age of Onset    Lung Disease Neg Hx        Social History     Social History    Marital status:      Spouse name: N/A    Number of children: N/A    Years of education: N/A     Occupational History    Not on file.      Social History Main Topics    Smoking status: Never Smoker    Smokeless tobacco: Never Used    Alcohol use No    Drug use: No    Sexual activity: Not on file     Other Topics Concern    Not on file     Social History Narrative    Lifelong nonsmoker, negative for EtOH use. She is a . She has always lived in this general area. No history of toxic industrial, environmental exposure. 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 HENT: Negative for congestion, ear pain and rhinorrhea. Eyes: Negative for photophobia and discharge. Respiratory: Negative for cough and shortness of breath. Cardiovascular: Negative for chest pain and palpitations. Gastrointestinal: Negative for abdominal pain, constipation, diarrhea and vomiting. Endocrine: Negative for cold intolerance and heat intolerance. Genitourinary: Negative for dysuria and flank pain. Musculoskeletal: Positive for arthralgias. Negative for myalgias and neck pain. Skin: Negative for rash and wound. Allergic/Immunologic: Negative for environmental allergies and food allergies. Neurological: Negative for tingling, loss of consciousness, syncope, numbness and headaches. Hematological: Negative for adenopathy. Does not bruise/bleed easily. Psychiatric/Behavioral: Negative for dysphoric mood. The patient is not nervous/anxious. All other systems reviewed and are negative. Vitals:    06/04/18 0626   BP: (!) 168/93   Pulse: 65   Resp: 20   Temp: 97.6 °F (36.4 °C)   SpO2: 97%            Physical Exam   Constitutional: She is oriented to person, place, and time. She appears well-developed and well-nourished. No distress. HENT:   Head: Normocephalic and atraumatic. Mouth/Throat: Oropharynx is clear and moist.   Eyes: Conjunctivae and EOM are normal. Pupils are equal, round, and reactive to light. Right eye exhibits no discharge. Left eye exhibits no discharge.  No scleral icterus. Neck: Normal range of motion. Neck supple. No thyromegaly present. Cardiovascular: Normal rate, regular rhythm, normal heart sounds and intact distal pulses. Exam reveals no gallop and no friction rub. No murmur heard. Pulmonary/Chest: Effort normal and breath sounds normal. No respiratory distress. She has no wheezes. She exhibits no tenderness. Abdominal: Soft. Bowel sounds are normal. She exhibits no distension. There is no hepatosplenomegaly. There is no tenderness. There is no rebound and no guarding. No hernia. Musculoskeletal: Normal range of motion. She exhibits no edema. Right wrist: She exhibits tenderness. She exhibits normal range of motion, no swelling, no effusion, no crepitus, no deformity and no laceration. Right hip: She exhibits tenderness. She exhibits normal range of motion, normal strength, no bony tenderness, no swelling and no deformity. Neurological: She is alert and oriented to person, place, and time. No cranial nerve deficit. She exhibits normal muscle tone. Skin: Skin is warm. No laceration and no rash noted. She is not diaphoretic. No erythema. Psychiatric: She has a normal mood and affect. Her behavior is normal. Judgment and thought content normal.   Nursing note and vitals reviewed. MDM  Number of Diagnoses or Management Options  Closed head injury, initial encounter: new and requires workup  Multiple contusions: new and requires workup  Diagnosis management comments: CT head negative for acute changes. Plain films revealed some degenerative changes but no acute fractures or dislocations. Patient ambulated without difficulty. States that she was not lightheaded, dizzy or nauseous. 8:53 AM  Patient noted to have elevated blood pressure readings today. Patient is completely asymptomatic. She denies any chest pain, headache, dizziness, vision changes, vomiting, no weakness in arm or leg.   Had a long discussion about the patient and the appropriate way to manage of a blood pressure. We will refer her to [facility] primary care for close follow-up. Specifically, one in nature that her blood pressure either normalizes her that she begins appropriate treatment. Patient did voice a concern that her current primary care doctor has limited availability and also that she would like someone closer to her place of employment to establish primary care. Amount and/or Complexity of Data Reviewed  Tests in the radiology section of CPT®: ordered and reviewed  Review and summarize past medical records: yes  Independent visualization of images, tracings, or specimens: yes    Risk of Complications, Morbidity, and/or Mortality  Presenting problems: moderate  Diagnostic procedures: moderate  Management options: moderate  General comments: Elements of this note have been dictated via voice recognition software. Text and phrases may be limited by the accuracy of the software. The chart has been reviewed, but errors may still be present.       Patient Progress  Patient progress: improved        ED Course       Procedures

## 2018-06-04 NOTE — ED TRIAGE NOTES
Pt experienced witnessed fall by elevators in front of preop. Pt states she did not feel dizzy prior to falling and felt like her foot \"caught on something\". Pt fell hitting her head on the floor but denies LOC. Hx of R hip replacement.

## 2018-06-05 ENCOUNTER — PATIENT OUTREACH (OUTPATIENT)
Dept: OTHER | Age: 78
End: 2018-06-05

## 2018-06-05 NOTE — PROGRESS NOTES
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 Verified  and zip code with patient as identifiers. Provided introduction to self, and explanation of the Nurse Care Manager role. 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 Indications: 2 puff BID    zafirlukast (ACCOLATE) 20 mg tablet TAKE 1 TABLET BY MOUTH TWICE A DAY    meloxicam (MOBIC) 7.5 mg tablet TAKE 1 TABLET BY MOUTH TWICE A DAY    traMADol (ULTRAM) 50 mg tablet Take 50 mg by mouth every six (6) hours as needed for Pain.  albuterol (PROVENTIL VENTOLIN) 2.5 mg /3 mL (0.083 %) nebulizer solution 1 vial via nebulizer qid prn    Dx - asthma J45.909    fluticasone-salmeterol (ADVAIR DISKUS) 250-50 mcg/dose diskus inhaler 1 inhalation bid, rinse mouth after use    albuterol (PROAIR HFA) 90 mcg/actuation inhaler 2 puffs qid prn    esomeprazole (NEXIUM) 20 mg capsule Take 20 mg by mouth daily.  HYDROcodone-acetaminophen (NORCO) 7.5-325 mg per tablet Take 1-2 Tabs by mouth every four (4) hours as needed. Max Daily Amount: 12 Tabs. (Patient not taking: Reported on 9/20/2017)    magic mouthwash solution 1 tsp swish and swallow tid     No current facility-administered medications for this visit. There are no discontinued medications. Performed medication reconciliation with patient, and patient verbalizes understanding of administration of home medications. There were no barriers to obtaining medications identified at this time. Inpatient RRAT score: NA   Was this a readmission? no   Patient stated reason for the readmission: NA    Barriers/Support system:  patient and friend      Red Flags:  Call your PCP if you  · Find it harder to solve problems, think, concentrate, or remember. · Have headaches. · Have changes in your sleep patterns, insomnia, or feel sleepy and sleep all day. · Have changes in your personality. · Have no inerest in your usual activities. · Feel angry or anxious without a clear reason. · Lose your sense of taste or smell. · Become dizzy, lightheaded, or unsteady. It may be hard to stand or walk. Patient verbalized understanding; Discharge Instructions :  Reviewed discharge instructions with patient. Patient verbalizes understanding of discharge instructions and follow-up care. Advance Care Planning:   Patient was offered the opportunity to discuss advance care planning:  yes     Does patient have an Advance Directive:  yes   If no, did you provide information on Caring Connections?  no     PCP/Specialist follow up: Patient scheduled to follow up with Donna Osborne MD as a new patient on 8/1/18 at 12:45pm.   Initial FU is with BS Work well; Future Appointments  Date Time Provider Trudy Flores   7/23/2018 2:00 PM Aria Chappell NP [facility] PP PP   8/1/2018 1:20 PM Donna Osborne MD [place] [facility]      Reviewed red flags with patient, and patient verbalizes understanding. Patient given an opportunity to ask questions. No other clinical/social/functional needs noted.    The patient agrees to contact the PCP office for questions related to their healthcare. The patient expressed thanks, offered no additional questions and ended the call.

## 2018-06-07 ENCOUNTER — PATIENT OUTREACH (OUTPATIENT)
Dept: OTHER | Age: 78
End: 2018-06-07

## 2018-06-07 NOTE — PROGRESS NOTES
Care Manager contacted the patient by telephone in follow up. Verified  and zip code with patient as identifiers. Spoke with patient who states she is doing well, does have some soreness now, denies bruising that she can see. But states she will have her friend Donte Lira look for sure at her back tonight just to make sure. Explained the need to watch the size of the bruising for any increase; Patient verbalized understanding;  States she has made an appointment with Dr. Sejal Singh for 8/10/18 for a new PCP;  States she took some tylenol for the soreness this morning and is doing well at work today. States no BLE weakness and denies any problems with ambulation or gait; Patient thanks me for calling and states she will reach out to me if she needs anything;  Patient has my name and contact information for any follow up needs or questions.

## 2018-07-06 ENCOUNTER — PATIENT OUTREACH (OUTPATIENT)
Dept: OTHER | Age: 78
End: 2018-07-06

## 2018-07-07 NOTE — PROGRESS NOTES
Resolving current episode. Transitions of care complete. Goals met without recurrence of falls or red flags. No further ED/UC or hospital admissions within 30 days post discharge. Patient attended follow-up appointments as directed. No outreach from patient to [address] [place] [address].

## 2018-08-10 PROBLEM — M15.9 PRIMARY OSTEOARTHRITIS INVOLVING MULTIPLE JOINTS: Status: ACTIVE | Noted: 2017-08-29

## 2019-03-21 PROBLEM — M15.9 PRIMARY OSTEOARTHRITIS INVOLVING MULTIPLE JOINTS: Chronic | Status: ACTIVE | Noted: 2017-08-29

## 2019-03-21 PROBLEM — Z96.649 S/P TOTAL HIP ARTHROPLASTY: Chronic | Status: ACTIVE | Noted: 2017-08-30

## 2019-03-21 PROBLEM — J30.89 NON-SEASONAL ALLERGIC RHINITIS: Chronic | Status: ACTIVE | Noted: 2017-07-19

## 2019-07-26 ENCOUNTER — APPOINTMENT (OUTPATIENT)
Dept: GENERAL RADIOLOGY | Age: 79
End: 2019-07-26
Attending: EMERGENCY MEDICINE
Payer: COMMERCIAL

## 2019-07-26 ENCOUNTER — APPOINTMENT (OUTPATIENT)
Dept: CT IMAGING | Age: 79
End: 2019-07-26
Attending: EMERGENCY MEDICINE
Payer: COMMERCIAL

## 2019-07-26 ENCOUNTER — HOSPITAL ENCOUNTER (EMERGENCY)
Age: 79
Discharge: HOME | End: 2019-07-26
Attending: EMERGENCY MEDICINE
Payer: COMMERCIAL

## 2019-07-26 VITALS
HEART RATE: 70 BPM | TEMPERATURE: 97.9 F | DIASTOLIC BLOOD PRESSURE: 84 MMHG | HEIGHT: 65 IN | BODY MASS INDEX: 36.65 KG/M2 | OXYGEN SATURATION: 98 % | WEIGHT: 220 LBS | SYSTOLIC BLOOD PRESSURE: 156 MMHG | RESPIRATION RATE: 16 BRPM
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
## 2019-07-26 PROCEDURE — 73502 X-RAY EXAM HIP UNI 2-3 VIEWS: CPT

## 2019-07-26 PROCEDURE — 99283 EMERGENCY DEPT VISIT LOW MDM: CPT | Performed by: EMERGENCY MEDICINE

## 2019-07-26 PROCEDURE — 70450 CT HEAD/BRAIN W/O DYE: CPT

## 2019-07-26 PROCEDURE — L0160 CERV SR WIRE OCC/MAN PRE OTS: HCPCS

## 2019-07-26 NOTE — ED PROVIDER NOTES
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  She states she fell over a bag and hit her head on the floor. She denies any loss of consciousness or neck pain. She has had a right hip replacement, is now complaining of pain to her right hip. She denies any nausea or confusion. She states she remembers everything that happened and remembers the events after her fall. Elements of this note were created using speech recognition software. As such, errors of speech recognition may be present.            Past Medical History:   Diagnosis Date    Arthritis     Asthma exacerbation     hospitalization 2004    Bilateral arm fractures 2013    GERD (gastroesophageal reflux disease)     Hiatal hernia     Morbid obesity ([facility] [address]        Past Surgical History:   Procedure Laterality Date    HX HYSTERECTOMY  1960's    HX KNEE REPLACEMENT Left 2005    HX KNEE REPLACEMENT Right 2006    HX ORTHOPAEDIC  2013    arm         Family History:   Problem Relation Age of Onset    Lung Disease Neg Hx        Social History     Socioeconomic History    Marital status:      Spouse name: Not on file    Number of children: Not on file    Years of education: Not on file    Highest education level: Not on file   Occupational History    Not on file   Social Needs    Financial resource strain: Not on file    Food insecurity:     Worry: Not on file     Inability: Not on file    Transportation needs:     Medical: Not on file     Non-medical: Not on file   Tobacco Use    Smoking status: Never Smoker    Smokeless tobacco: Never Used   Substance and Sexual Activity    Alcohol use: No    Drug use: No    Sexual activity: Never   Lifestyle    Physical activity:     Days per week: Not on file     Minutes per session: Not on file    Stress: Not on file   Relationships    Social connections:     Talks on phone: Not on file     Gets together: Not on file     Attends Synagogue service: Not on file     Active member of club or organization: Not on file     Attends meetings of clubs or organizations: Not on file     Relationship status: Not on file    Intimate partner violence:     Fear of current or ex partner: Not on file     Emotionally abused: Not on file     Physically abused: Not on file     Forced sexual activity: Not on file   Other Topics Concern    Not on file   Social History Narrative    Lifelong nonsmoker, negative for EtOH use. She is a . She has always lived in this general area. No history of toxic industrial, environmental exposure. She works at [employer] as a  in the surgery waiting room. .         ALLERGIES: Codeine; Sulfa (sulfonamide antibiotics); and Zithromax [azithromycin]    Review of Systems   Constitutional: Negative for chills and fever. Gastrointestinal: Negative for nausea and vomiting. All other systems reviewed and are negative. Vitals:    07/26/19 0732   BP: (!) 204/92   Pulse: 68   Resp: 16   Temp: 97.9 °F (36.6 °C)   SpO2: 99%   Weight: 99.8 kg (220 lb)   Height: 5' 5\" (1.651 m)            Physical Exam   Constitutional: She is oriented to person, place, and time. She appears well-developed and well-nourished. HENT:   Head: Normocephalic and atraumatic. Eyes: Pupils are equal, round, and reactive to light. Conjunctivae are normal.   Neck: Normal range of motion. Neck supple. Musculoskeletal: She exhibits no edema or tenderness. Mild discomfort to right hip with range of motion   Neurological: She is alert and oriented to person, place, and time. Skin: Skin is warm and dry. Psychiatric: She has a normal mood and affect. Her behavior is normal.   Nursing note and vitals reviewed.        MDM  Number of Diagnoses or Management Options  Fall, initial encounter: new and does not require workup  Diagnosis management comments: 9:39 AM Discussed results with patient, unremarkable x-rays and CT scan       Amount and/or Complexity of Data Reviewed  Tests in the radiology section of CPT®: ordered and reviewed    Risk of Complications, Morbidity, and/or Mortality  Presenting problems: moderate  Diagnostic procedures: moderate  Management options: moderate    Patient Progress  Patient progress: stable         Procedures

## 2019-07-26 NOTE — ED NOTES
Pt resting on stretcher with pillow and blanket at this time. Pt denies any new or worsening pain and appears absent of distress.

## 2019-07-26 NOTE — LETTER
[address] [facility] EMERGENCY DEPT 
[address] [facility] 
[phone] Work/School Note Date: 7/26/2019 To Whom It May concern: 
 
Claudell Bard was seen and treated today in the emergency room by the following provider(s): 
Attending Provider: Jany Lnaza MD.   
 
Claudell Bard may return to work on 07/27/2019. Sincerely, [address] [facility]

## 2019-07-26 NOTE — ED TRIAGE NOTES
Pt employee who tripped over her bag and fell backwards onto the wood/ vinyl teresa and hit her head and her right hip. Utilized scoop stretcher with pt and placed on stretcher for transport to the ER. Pt placed in c-collar as well. Pt denies LOC or use of anticoagulants. Pt has pain in the right hip. Pt right leg appears laterally rotated, but pt is able to move the right leg without significant pain/ discomfort. Pt has had a hip replacement in the right hip about 3- 4 years ago and bilateral knee replacements. Pt is A&O x 4.

## 2019-07-26 NOTE — ED NOTES
I have reviewed discharge instructions with the patient. The patient verbalized understanding. Patient left ED via Discharge Method: ambulatory to Home with friend/ co-worker. Opportunity for questions and clarification provided. Patient given 0 scripts. To continue your aftercare when you leave the hospital, you may receive an automated call from our care team to check in on how you are doing. This is a free service and part of our promise to provide the best care and service to meet your aftercare needs.  If you have questions, or wish to unsubscribe from this service please call [phone]. Thank you for Choosing our [employer] Emergency Department.

## 2019-07-26 NOTE — PROGRESS NOTES
Spiritual Care Visit, in the Emergency Department. Employee fell in hallway and was taken to the ED for examination.  visited patient there before she was treated and taken home. Visit, prayer, with patient at bedside. Signed by Jigar Cheek, Staff

## 2019-07-29 ENCOUNTER — PATIENT OUTREACH (OUTPATIENT)
Dept: OTHER | Age: 79
End: 2019-07-29

## 2019-07-29 NOTE — PROGRESS NOTES
Patient on report as eligible for Case Management. Left discreet message on voicemail with this CM contact information. Will attempt to contact again to offer [address] [place] [address] Management services. Outreach call placed at 4:15pm and patient answered;  Verified  and Zip Code as identifiers;  States she was at work today and get off at 1:30pm;     Patient discharged from [facility] on 19 following a fall onto her right hip after tripping on something in the floor; Patient with previous Right Hip replacement approx. 18 months ago;        Medical History:     Past Medical History:   Diagnosis Date    Arthritis     Asthma exacerbation     hospitalization     Bilateral arm fractures     GERD (gastroesophageal reflux disease)     Hiatal hernia     Morbid obesity [facility])        Care Manager contacted the patient by telephone to perform post ED discharge assessment. Verified  and zip code with patient as identifiers. Provided introduction to self, and explanation of the Nurse Care Manager role.       Patient's primary care provider reviewed with patient and is current;      CM Initial Plan of Care    Impaired Mobility, Acute pain                     Goal: Demonstrate behaviors that enable safe resumption of PLOF and no further falls;  · Reports some brusiing of right hip following fall;    · States was using her cane over the weekend, but was not today;  · Reports pain 2-3/10, was able to return to work today;   Reviewed for any safety barriers in the home, outside the home, or entering the car;   Importance of using walker/cane to prevent falls in home and outside home;    Reviewed importance of using appropriate device for maneuvering stairs;   Discussed using handrails along with assistive device, especially initially;   Reviewed potential for furniture barriers and rugs with rolling walker;   Discussed importance of walking in areas with plenty of light;   · Patient denies any safety barriers to home mobility - like rugs or stairs;   Discussed importance of using shower chair, grabs bars for steadiness w/ showering;     Reviewed importance of sturdy, flat sole shoes for walking  NO open backs, slippers or slip-ons;  · Reports feeling sore, but much improved from Friday;  · Reports only taking Tylenol over the weekend; Potential Learning Need  Goal:   Completes all FU appointments within 30 days of discharge;  · Reviewed DC instructions and FU appointments with patient;  · Verbalized initial FU appt is tomorrow;    Medication:   New Medications at Discharge:  None;  Changed Medications at Discharge: NA  Discontinued Medications at Discharge: NA    Current Outpatient Medications   Medication Sig    magic mouthwash solution Maalox, viscous lidocaine, Benadryl, nystatin: mix equal parts. 5ml swish & swallow qid    DM/pe/acetaminophen/chlorphenr (ROBITUSSIN COLD, COUGH, & FLU PO) Take  by mouth as needed.  OTHER Prevnar 13 # 1 dose IM    albuterol (PROAIR HFA) 90 mcg/actuation inhaler 2 puffs qid prn    fluticasone propion-salmeterol (ADVAIR DISKUS) 250-50 mcg/dose diskus inhaler 1 inhalation bid, rinse mouth after use    zafirlukast (ACCOLATE) 20 mg tablet 1 po bid    albuterol (PROVENTIL VENTOLIN) 2.5 mg /3 mL (0.083 %) nebulizer solution 1 vial via nebulizer qid prn    Dx - asthma J45.909    esomeprazole (NEXIUM) 20 mg capsule Take 20 mg by mouth daily. No current facility-administered medications for this visit. Performed medication reconciliation with patient, and patient verbalizes understanding of administration of home medications. There were no barriers to obtaining medications identified at this time. Inpatient RRAT score:  8  Was this a readmission?  no   Patient stated reason for the readmission: NA    Barriers/Support system:  patient and sister    Barriers/Challenges to Care:  []  Decline in memory    []  Language barrier     []  Emotional [x]  Limited mobility  []  Lack of motivation     [] Vision, hearing or cognitive impairment []  Knowledge [] Financial Barriers []  Lack of support  [x]  Pain []  Other []  None    Discharge Instructions :  Reviewed discharge instructions with patient. Patient verbalizes understanding of discharge instructions and follow-up care. Advance Care Planning:   Patient was offered the opportunity to discuss advance care planning:  yes     Does patient have an Advance Directive:  no   If no, did you provide information on Caring Connections? yes     PCP/Specialist follow up:       Future Appointments   Date Time Provider Trudy Sandra   8/12/2019  2:00 PM Sheyla Martinez MD [facility]   9/20/2019  2:00 PM Sara Burciaga NP [facility] PP PP      Reviewed red flags with patient, and patient verbalizes understanding. Patient given an opportunity to ask questions. No other clinical/social/functional needs noted. The patient agrees to contact the PCP office for questions related to their healthcare. The patient expressed thanks, offered no additional questions and ended the call.       Plan Next Call:  States she is off next week for a long awaited week off to get some things done around the house;  Agreed to FU call next week as she is home;

## 2019-08-06 ENCOUNTER — PATIENT OUTREACH (OUTPATIENT)
Dept: OTHER | Age: 79
End: 2019-08-06

## 2019-08-06 NOTE — PROGRESS NOTES
Care Manager contacted the patient by telephone in follow up. Verified  and zip code with patient as identifiers. 69 yo female who is two weeks post ED visit for fall onto her right hip after tripping on something in the floor; C/O significant pain to hip with some limited R hip ROM during visit;  PMH:  Right Hip replacement approx. 18 months ago;         Assessment of clinical changes and knowledge demonstrated since last call:   Ongoing Plan of Care:     Impaired Mobility, Acute pain                     Goal: Demonstrate behaviors that enable safe resumption of PLOF and no further falls;  · Reports some brusiing of right hip has almost resolved;  ? Denies new bruising;  ? States no longer using cane, stable without it;  · Denies any new falls since DC;  · Report pain has resolved since our last call;   ? Reports only taking Tylenol for occasional overall joint discomfort;     Potential Learning Need  Goal:     Completes all FU appointments within 30 days of discharge;  · Verbalized next appt for FU on 19 with Dr Toth;  · Reports returned to work;  · \"I even washed my car this morning while it was not so hot\"  · \"I am very close back to 100% now\"       Review and discussion of plan of care with patient, who has provided input to plan, verbalized understanding and agrees with current goals. Any recurrence Red Flags or continued symptoms: None;    Medication Regimen Change:  None;  Completed a review of medications with patient, who verbalized understanding of how and when to take medications. Barriers / Adherence with medications:  No problems;    Upcoming Appointments:    Future Appointments   Date Time Provider Trudy Flores   2019  2:00 PM Sonia Pierson MD  [address] Fof[facility] [address] [facility]   2019  2:00 PM Anita Smith NP [facility] PP PP       Patient asked questions appropriately and denied any additional needs at this time.   Patient verbalized understanding of all information discussed. Patient has my name and contact information for any follow up needs or questions.      Plan next call:   FU in two weeks after appt on 08/12/19 for any changes to treatment plan;

## 2019-08-20 ENCOUNTER — PATIENT OUTREACH (OUTPATIENT)
Dept: OTHER | Age: 79
End: 2019-08-20

## 2019-08-20 NOTE — PROGRESS NOTES
CCM Follow Up. * Covering for CM Paxton Uribe RN    8/20/19 2:10 pm Telephone attempt to contact patient for transitions of care. Left discreet message on voicemail with this CC contact information. Will inform CM was unable to reach.

## 2019-09-04 ENCOUNTER — PATIENT OUTREACH (OUTPATIENT)
Dept: OTHER | Age: 79
End: 2019-09-04

## 2019-09-04 NOTE — PROGRESS NOTES
Care Manager contacted the patient in follow up outreach. No response and left a brief, discreet voicemail message with my contact information, asking for return call. Will await FU call response.

## 2019-09-06 ENCOUNTER — PATIENT OUTREACH (OUTPATIENT)
Dept: OTHER | Age: 79
End: 2019-09-06

## 2019-09-06 NOTE — PROGRESS NOTES
Care Manager contacted the patient by telephone in follow up. Verified  and zip code with patient as identifiers. 69 yo female who is one month post ED visit for fall onto Right Hip after tripping on something in the floor; C/O significant pain to hip with some limited R hip ROM during visit;    PMH:  Right Hip replacement approx. 18 months ago;      Assessment of clinical changes and knowledge demonstrated since last call:   Ongoing Plan of Care:     Impaired Mobility, Acute pain                     Goal Met: Demonstrate behaviors that enable safe resumption of PLOF and no further falls;  ? Reports bruising has resolved/healed;   ? Denies any new falls since DC;  ? Reports no unsteadiness on feet and able to go without cane now;  ? Denies any pain or discomfort remains from fall;  ? Returned to usual activity level;     Potential Learning Need  Goal Met:     Completes all FU appointments within 30 days of discharge;  · Verbalized completed her FU appointments; Review and discussion of plan of care with patient, who has provided input to plan, verbalized understanding and agrees with current goals. Any recurrence Red Flags or continued symptoms: None    Medication Regimen Change:  none  Completed a review of medications with patient, who verbalized understanding of how and when to take medications. Upcoming Appointments:    Future Appointments   Date Time Provider Trudy Flores   2019  2:00 PM [place] Triana NP [facility] PP PP   10/9/2019 10:15 AM [facility]       Patient asked questions appropriately and denied any additional needs at this time. Patient verbalized understanding of all information discussed. Patient has my name and contact information for any follow up needs or questions. Plan next call:    If no needs identified may grad next call;

## 2019-10-08 ENCOUNTER — PATIENT OUTREACH (OUTPATIENT)
Dept: OTHER | Age: 79
End: 2019-10-08

## 2019-10-08 NOTE — PROGRESS NOTES
Care Manager contacted the patient by telephone in follow up. Verified  and zip code with patient as identifiers. Patient states she is doing very well, screening mammography completed today; In good spirits; This is a 71 yo female who is 2 months post ED visit for fall onto Right Hip with significant pain and limited ROM ; Recent history of Right Hip replacement; No additional falls or further injury; Resolving current episode of case management. Patient has met patient stated and/or medical goals. Goals Met with Plan of Care:   Completes all FU appointments within 30 days of discharge;  Demonstrate behaviors that enable safe resumption of PLOF and no further falls; Patient consistenly demonstrates understanding of the medical action plan through execution of plan. Appointments with key providers are scheduled and attended. Plan of care modified and updated (proactively uses physicians and community resources when needed). The support system remains current and has been modified as needed. Patient continues to acquire needed resources from the current support system established. Patient modifies his/her lifestyle toreduce or avoid risk factors to his/her health. ECM will follow as needed and patient has ECM contact information for future needs. No

## 2021-04-26 ENCOUNTER — APPOINTMENT (OUTPATIENT)
Dept: CT IMAGING | Age: 81
End: 2021-04-26
Attending: EMERGENCY MEDICINE
Payer: COMMERCIAL

## 2021-04-26 ENCOUNTER — APPOINTMENT (OUTPATIENT)
Dept: GENERAL RADIOLOGY | Age: 81
End: 2021-04-26
Attending: EMERGENCY MEDICINE
Payer: COMMERCIAL

## 2021-04-26 ENCOUNTER — HOSPITAL ENCOUNTER (EMERGENCY)
Age: 81
Discharge: HOME | End: 2021-04-26
Attending: EMERGENCY MEDICINE
Payer: COMMERCIAL

## 2021-04-26 VITALS
SYSTOLIC BLOOD PRESSURE: 189 MMHG | BODY MASS INDEX: 39.6 KG/M2 | TEMPERATURE: 98.2 F | OXYGEN SATURATION: 95 % | RESPIRATION RATE: 16 BRPM | DIASTOLIC BLOOD PRESSURE: 99 MMHG | HEIGHT: 63 IN | HEART RATE: 89 BPM

## 2021-04-26 DIAGNOSIS — S09.90XA CLOSED HEAD INJURY, INITIAL ENCOUNTER: Primary | ICD-10-CM

## 2021-04-26 DIAGNOSIS — M25.559 HIP PAIN: ICD-10-CM

## 2021-04-26 LAB
ATRIAL RATE: 79 BPM
CALCULATED P AXIS, ECG09: 75 DEGREES
CALCULATED R AXIS, ECG10: -5 DEGREES
CALCULATED T AXIS, ECG11: 80 DEGREES
DIAGNOSIS, 93000: NORMAL
P-R INTERVAL, ECG05: 186 MS
Q-T INTERVAL, ECG07: 392 MS
QRS DURATION, ECG06: 86 MS
QTC CALCULATION (BEZET), ECG08: 449 MS
VENTRICULAR RATE, ECG03: 79 BPM

## 2021-04-26 PROCEDURE — 73502 X-RAY EXAM HIP UNI 2-3 VIEWS: CPT

## 2021-04-26 PROCEDURE — 70450 CT HEAD/BRAIN W/O DYE: CPT

## 2021-04-26 PROCEDURE — 99284 EMERGENCY DEPT VISIT MOD MDM: CPT

## 2021-04-26 PROCEDURE — 73060 X-RAY EXAM OF HUMERUS: CPT

## 2021-04-26 PROCEDURE — 93005 ELECTROCARDIOGRAM TRACING: CPT | Performed by: EMERGENCY MEDICINE

## 2021-04-26 PROCEDURE — 73080 X-RAY EXAM OF ELBOW: CPT

## 2021-04-26 NOTE — ED PROVIDER NOTES
72-year-old female with a history of asthma, GERD, hiatal hernia presents after a fall. She reports turning around to go to her desk when she suddenly fell. She is not sure how it happened. Does not remember tripping or feeling dizzy. No chest pain, headache, or shortness of breath. No loss of consciousness. She hit her head on the wall and then the floor. She also hit her right elbow and hip. 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 She has not been ambulatory since the event. Denies neck pain. Has a mild headache. Does not take anticoagulants. Fall  Associated symptoms include headaches. Pertinent negatives include no fever, no numbness, no abdominal pain, no nausea and no vomiting.         Past Medical History:   Diagnosis Date    Arthritis     Asthma exacerbation     hospitalization 2004    Bilateral arm fractures 2013    GERD (gastroesophageal reflux disease)     Hiatal hernia     Morbid obesity ([facility] [address]        Past Surgical History:   Procedure Laterality Date    HX HYSTERECTOMY  1960's    HX KNEE REPLACEMENT Left 2005    HX KNEE REPLACEMENT Right 2006    HX ORTHOPAEDIC  2013    arm         Family History:   Problem Relation Age of Onset    Lung Disease Neg Hx        Social History     Socioeconomic History    Marital status:      Spouse name: Not on file    Number of children: Not on file    Years of education: Not on file    Highest education level: Not on file   Occupational History    Not on file   Social Needs    Financial resource strain: Not on file    Food insecurity     Worry: Not on file     Inability: Not on file    Transportation needs     Medical: Not on file     Non-medical: Not on file   Tobacco Use    Smoking status: Never Smoker    Smokeless tobacco: Never Used   Substance and Sexual Activity    Alcohol use: No    Drug use: No    Sexual activity: Never   Lifestyle    Physical activity     Days per week: Not on file     Minutes per session: Not on file  Stress: Not on file   Relationships    Social connections     Talks on phone: Not on file     Gets together: Not on file     Attends Christianity service: Not on file     Active member of club or organization: Not on file     Attends meetings of clubs or organizations: Not on file     Relationship status: Not on file    Intimate partner violence     Fear of current or ex partner: Not on file     Emotionally abused: Not on file     Physically abused: Not on file     Forced sexual activity: Not on file   Other Topics Concern    Not on file   Social History Narrative    Lifelong nonsmoker, negative for EtOH use. She is a . She has always lived in this general area. No history of toxic industrial, environmental exposure. 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 .         ALLERGIES: Codeine, Sulfa (sulfonamide antibiotics), and Zithromax [azithromycin]    Review of Systems   Constitutional: Negative for fever. HENT: Negative for hearing loss. Eyes: Negative for visual disturbance. Respiratory: Negative for cough and shortness of breath. Cardiovascular: Negative for chest pain. Gastrointestinal: Negative for abdominal pain, diarrhea, nausea and vomiting. Musculoskeletal: Positive for arthralgias. Negative for back pain. Skin: Negative for rash. Neurological: Positive for headaches. Negative for weakness and numbness. Psychiatric/Behavioral: Negative for confusion. All other systems reviewed and are negative. Vitals:    04/26/21 1219   BP: (!) 197/95   Pulse: 87   Resp: 16   Temp: 98.2 °F (36.8 °C)   SpO2: 97%   Height: 5' 3\" (1.6 m)            Physical Exam  Vitals signs and nursing note reviewed. Constitutional:       Appearance: Normal appearance. She is well-developed. HENT:      Head: Normocephalic and atraumatic. No contusion or laceration.         Nose: Nose normal.      Mouth/Throat:      Mouth: Mucous membranes are moist.   Eyes: Pupils: Pupils are equal, round, and reactive to light. Neck:      Musculoskeletal: Normal range of motion and neck supple. Normal range of motion. No spinous process tenderness or muscular tenderness. Cardiovascular:      Rate and Rhythm: Regular rhythm. Heart sounds: Normal heart sounds. Pulmonary:      Effort: Pulmonary effort is normal.      Breath sounds: Normal breath sounds. Abdominal:      Palpations: Abdomen is soft. Tenderness: There is no abdominal tenderness. Musculoskeletal: Normal range of motion. General: No deformity. Right shoulder: Normal.      Right elbow: She exhibits normal range of motion and no swelling. Tenderness found. Right hip: She exhibits tenderness. She exhibits normal range of motion, no swelling and no deformity. Arms:         Legs:    Skin:     General: Skin is warm and dry. Neurological:      General: No focal deficit present. Mental Status: She is alert. Mental status is at baseline. Cranial Nerves: Cranial nerves are intact. Sensory: Sensation is intact. Motor: Motor function is intact. Psychiatric:         Mood and Affect: Mood normal.         Behavior: Behavior normal.          MDM  Number of Diagnoses or Management Options  Diagnosis management comments: Parts of this document were created using dragon voice recognition software. The chart has been reviewed but errors may still be present. I wore appropriate PPE throughout this patient's ED visit. Romayne Brisker, MD, 12:36 PM    No acute injuries     I discussed the results of all labs, procedures, radiographs, and treatments with the patient and available family. Treatment plan is agreed upon and the patient is ready for discharge. Questions about treatment in the ED and differential diagnosis of presenting condition were answered.   Patient was given verbal discharge instructions including, but not limited to, importance of returning to the emergency department for any concern of worsening or continued symptoms. Instructions were given to follow up with a primary care provider or specialist within 1-2 days. Adverse effects of medications, if prescribed, were discussed and patient was advised to refrain from significant physical activity until followed up by primary care physician and to not drive or operate heavy machinery after taking any sedating substances. Amount and/or Complexity of Data Reviewed  Tests in the radiology section of CPT®: ordered and reviewed (Xr Humerus Rt    Result Date: 4/26/2021  Right humerus Clinical indication: Acute moderate pain after a fall injury today, patient reports history of a prior upper extremity fracture years ago Comparison: Correlation with right forearm 6/4/2018 Technique: AP and lateral views of right humerus Findings: There is no evidence of acute fracture, dislocation, or erosion. The bone density is low which can limit sensitivity for subtle osseous lesions. A chronic osseous fragment is a again noted abutting the anterior proximal radius. There are chronic age commensurate degenerative joint changes at the shoulder and elbow. A small well-defined 1 cm chronic appearing calcification within soft tissues inferior to the right shoulder joint could be related to a remote injury, chronic tendinopathy or bursitis, or other benign entities. Partially imaged right upper lateral lung appears grossly clear. No acute osseous abnormality. Chronic changes and low bone density. Xr Elbow Rt Min 3 V    Result Date: 4/26/2021  EXAM: XR ELBOW RT MIN 3 V INDICATION: Pain after a fall. COMPARISON: None. FINDINGS: Three  views of the right elbow demonstrate a chronic appearing radial neck fracture with degenerative changes of the radiocapitellar joint. No evidence of a joint effusion. Degenerative changes of both epicondyles. .      No acute abnormalities.     Xr Hip Rt W Or Wo Pelv 2-3 Vws    Result Date: 4/26/2021  EXAM:  XR HIP RT W OR WO PELV 2-3 VWS INDICATION:   Pain after a fall COMPARISON: None. FINDINGS: An AP view of the pelvis and a frogleg lateral view of the right hip demonstrate a total hip replacement without evidence of fractures or loosening. No acute fractures. Surgical clips are seen within the pelvis. .      No acute abnormalities. Ct Head Wo Cont    Result Date: 4/26/2021  History: fall with head trauma Exam: CT head without contrast Technique: Thin section axial CT images were obtained from the skullbase through the vertex. Radiation dose reduction techniques were used for this study. Our CT scanners use one or all of the following: Automated exposure control, adjustment of the mA and/or kV according to patient size, use of iterative reconstruction. COMPARISON: 7/26/2019 Findings: The ventricles are normal in size, shape, and position. No new abnormal parenchymal density is present. There is stable generalized cerebral atrophy. No extra-axial fluid collection is present. There is no mass or mass-effect. The basilar cisterns are patent. The paranasal sinuses and mastoid air cells are clear.      Stable CT head without contrast. No acute intracranial abnormality.     )           EKG    Date/Time: 4/26/2021 1:55 PM  Performed by: Levon Ace MD  Authorized by: Levon Ace MD     ECG reviewed by ED Physician in the absence of a cardiologist: yes    Interpretation:     Interpretation: abnormal    Rate:     ECG rate:  79    ECG rate assessment: normal    Rhythm:     Rhythm: sinus rhythm    Conduction:     Conduction: normal    ST segments:     ST segments:  Non-specific

## 2021-04-26 NOTE — ED NOTES
I have reviewed discharge instructions with the patient. The patient verbalized understanding. Patient left ED via Discharge Method: ambulatory to Home with (insert name of family/friend, self, transport SELF). Opportunity for questions and clarification provided. Patient given 0 scripts. To continue your aftercare when you leave the hospital, you may receive an automated call from our care team to check in on how you are doing.  This is a free service and part of our promise to provide the best care and service to meet your aftercare needs. \" If you have questions, or wish to unsubscribe from this service please call [phone].  Thank you for Choosing our [facility] Emergency Department.

## 2021-04-26 NOTE — ED TRIAGE NOTES
Patient arrives to EMS after falling while at work. Patient reports tripping and falling into wall. Reports hitting head on wall and floor, right elbow pain, right hip pain.

## 2021-05-10 ENCOUNTER — NURSE TRIAGE (OUTPATIENT)
Dept: OTHER | Facility: CLINIC | Age: 81
End: 2021-05-10

## 2021-05-10 NOTE — TELEPHONE ENCOUNTER
Location of employment: [employer]    Location of injury (body part involved):   Right arm  Time of injury: April 26th 11:00 a.m. Last 4 of N: 9373    Triage indicates for caller to be seen in the office today    Caller directed to go to the [facility] if they can not get an appointment today. Care advice provided, caller verbalizes understanding; denies any other questions or concerns. Reason for Disposition   Can't move injured elbow normally (i.e., bend or straighten completely)    Answer Assessment - Initial Assessment Questions  1. MECHANISM: \"How did the injury happen? \"     Fell backwards    2. ONSET: \"When did the injury happen? \" (Minutes or hours ago)       April 26th, patient was seen in the E.R. at that time, but states that her pain has gotten worse. 3. LOCATION: \"What part of the elbow is the injured? \"       Right elbow    4. APPEARANCE of INJURY: \"What does the injury look like? \"       Some bruising    5. SEVERITY: \"Can you use the elbow normally? \"  \"Can you bend it and straighten it fully? \"      Cannot straighten it fully    6. SIZE: For cuts, bruises, or swelling, ask: \"How large is it? \" (e.g., inches or centimeters; entire joint)       Almost the size of a dime    7. PAIN: \"Is there pain? \" If so, ask: \"How bad is the pain? \"    (Scale 1-10; or mild, moderate, severe)      No pain , she is not using it at the moment, has it propped up with an ice pack    8. TETANUS: For any breaks in the skin, ask: \"When was the last tetanus booster? \"      N/A    9. OTHER SYMPTOMS: \"Do you have any other symptoms? \"  (e.g., numbness in hand)      No    10. PREGNANCY: \"Is there any chance you are pregnant? \" \"When was your last menstrual period? \"        N/A    Protocols used: ELBOW INJURY-ADULT-[place]

## 2021-05-25 ENCOUNTER — PATIENT OUTREACH (OUTPATIENT)
Dept: OTHER | Age: 81
End: 2021-05-25

## 2021-05-25 NOTE — PROGRESS NOTES
Patient on report as eligible for Case Management. Left discreet message on voicemail with this CM contact information. Will attempt to contact again to offer [phone] Management services.

## 2021-06-02 ENCOUNTER — PATIENT OUTREACH (OUTPATIENT)
Dept: OTHER | Age: 81
End: 2021-06-02

## 2021-06-02 NOTE — PROGRESS NOTES
At [address] John[address] contacted the patient for follow up outreach, no response, left a VM message with this CM's contact information, asking for return call. Attempted outreach on secondary number listed and patient states to give her a call when she gets off work after 2pm today; At [phone] Murtaugh contacted the patient for follow up outreach, no response, left a VM message with this CM's contact information, asking for return call.   Await follow up from patient;

## 2021-06-11 ENCOUNTER — PATIENT OUTREACH (OUTPATIENT)
Dept: OTHER | Age: 81
End: 2021-06-11

## 2021-06-11 NOTE — PROGRESS NOTES
Recent ED visit following a fall at work on 04/26/21 and subsequent elbow pain; Outreach call placed today, reached this patient, who was quite pleasant and informative. Reports her elbow is fine now, a little over one month out from fall;  Uses a cane at work;    Patient noted admission ED risk score of 44%  Medicare secondary, Asthma    Last ED visits 04/26/21 for fall while at work, tripped over something fell backward;  · Elbow swelling initially but pain and swelling have now resolved;  · Completed post ED workup with [facility];     Prior ED visit 07/26/2019 for fall at work - R hip pain (previous R hip replacement)  Prior ED visit 06/04/2018 for fall at work - no concussion, hit head  · Work up completed on FU with [employer] [facility];    Patient declines follow up outreach by CM at this time, feels she has no needs at this time;  Reminded patient she is past due for a visit with her Pulmonologist.  She reports she plans to make an appt with him after she sees her PCP on 07/20/21;  Thanked her for her time and provided this CM contact information if she would like to reach out in the near future;

## 2021-06-11 NOTE — ACP (ADVANCE CARE PLANNING)
Advance Care Planning   Healthcare Decision Maker:         Click here to complete [place] including selection of the Healthcare Decision Maker Relationship (ie \"Primary\")  Today we discussed [place]. The patient is considering options.

## 2021-08-16 ENCOUNTER — TRANSCRIBE ORDER (OUTPATIENT)
Dept: SCHEDULING | Age: 81
End: 2021-08-16

## 2021-08-16 DIAGNOSIS — Z12.31 VISIT FOR SCREENING MAMMOGRAM: Primary | ICD-10-CM

## 2021-08-28 ENCOUNTER — HOSPITAL ENCOUNTER (OUTPATIENT)
Dept: MAMMOGRAPHY | Age: 81
Discharge: HOME | End: 2021-08-28
Attending: FAMILY MEDICINE
Payer: COMMERCIAL

## 2021-08-28 DIAGNOSIS — Z12.31 VISIT FOR SCREENING MAMMOGRAM: ICD-10-CM

## 2021-08-28 PROCEDURE — 77063 BREAST TOMOSYNTHESIS BI: CPT

## 2021-08-30 NOTE — PROGRESS NOTES
Mammogram results, per radiologist: \"IMPRESSION  1. Small nodular asymmetry in the upper, posterior right breast without clearly  suspicious findings although cannot be confirmed on any prior comparison study. Therefore, this should be further evaluated. This is only seen on cc imaging  possibly due to its far posterior location. Tomograms slice number favors a more  lateral location. Further evaluation with focal compression diagnostic  mammogram, and ultrasound if indicated is recommended. \"  She needs a R sided per radiologist recommendations. Diagnostic mammogram and right breast ultrasound, orders generated.   If she has not heard regarding these appointment times in the next 1-2 business days, she needs to call our office and let us know; thank you, Dr. Steffanie Watters

## 2021-09-08 ENCOUNTER — HOSPITAL ENCOUNTER (OUTPATIENT)
Dept: MAMMOGRAPHY | Age: 81
Discharge: HOME | End: 2021-09-08
Attending: FAMILY MEDICINE
Payer: COMMERCIAL

## 2021-09-08 DIAGNOSIS — R92.8 ABNORMAL SCREENING MAMMOGRAM: ICD-10-CM

## 2021-09-08 PROCEDURE — 77065 DX MAMMO INCL CAD UNI: CPT

## 2021-09-08 NOTE — PROGRESS NOTES
Her diagnostic right sided mammogram result, per radiologist: \"IMPRESSION  No suspicious right breast finding.     Recommend screening mammography in one year. A reminder letter will be  scheduled. This was reported to the patient at the time of interpretation. \"  Thank you, Dr. Malinda Walsh

## 2022-03-19 PROBLEM — Z96.649 S/P TOTAL HIP ARTHROPLASTY: Status: ACTIVE | Noted: 2017-08-30

## 2022-03-19 PROBLEM — J30.89 NON-SEASONAL ALLERGIC RHINITIS: Status: ACTIVE | Noted: 2017-07-19

## 2022-03-20 PROBLEM — M15.0 PRIMARY OSTEOARTHRITIS INVOLVING MULTIPLE JOINTS: Status: ACTIVE | Noted: 2017-08-29

## 2022-03-20 PROBLEM — M15.9 PRIMARY OSTEOARTHRITIS INVOLVING MULTIPLE JOINTS: Status: ACTIVE | Noted: 2017-08-29

## 2022-06-13 ENCOUNTER — HOSPITAL ENCOUNTER (EMERGENCY)
Age: 82
Discharge: HOME | End: 2022-06-13
Attending: EMERGENCY MEDICINE
Payer: COMMERCIAL

## 2022-06-13 ENCOUNTER — HOSPITAL ENCOUNTER (EMERGENCY)
Dept: CT IMAGING | Age: 82
Discharge: HOME | End: 2022-06-16
Payer: COMMERCIAL

## 2022-06-13 VITALS
HEIGHT: 65 IN | HEART RATE: 81 BPM | SYSTOLIC BLOOD PRESSURE: 162 MMHG | TEMPERATURE: 98.7 F | RESPIRATION RATE: 18 BRPM | BODY MASS INDEX: 33.32 KG/M2 | OXYGEN SATURATION: 98 % | WEIGHT: 200 LBS | DIASTOLIC BLOOD PRESSURE: 98 MMHG
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
## 2022-06-13 DIAGNOSIS — S01.91XA LACERATION OF HEAD WITHOUT FOREIGN BODY, UNSPECIFIED PART OF HEAD, INITIAL ENCOUNTER: ICD-10-CM

## 2022-06-13 PROCEDURE — 6360000002 HC RX W HCPCS: Performed by: PHYSICIAN ASSISTANT

## 2022-06-13 PROCEDURE — 72125 CT NECK SPINE W/O DYE: CPT

## 2022-06-13 PROCEDURE — 70450 CT HEAD/BRAIN W/O DYE: CPT

## 2022-06-13 PROCEDURE — 6370000000 HC RX 637 (ALT 250 FOR IP): Performed by: PHYSICIAN ASSISTANT

## 2022-06-13 PROCEDURE — 90714 TD VACC NO PRESV 7 YRS+ IM: CPT | Performed by: PHYSICIAN ASSISTANT

## 2022-06-13 PROCEDURE — 90471 IMMUNIZATION ADMIN: CPT | Performed by: PHYSICIAN ASSISTANT

## 2022-06-13 PROCEDURE — 99284 EMERGENCY DEPT VISIT MOD MDM: CPT

## 2022-06-13 PROCEDURE — 12013 RPR F/E/E/N/L/M 2.6-5.0 CM: CPT

## 2022-06-13 RX ORDER — TETANUS AND DIPHTHERIA TOXOIDS ADSORBED 2; 2 [LF]/.5ML; [LF]/.5ML
0.5 INJECTION INTRAMUSCULAR
Status: COMPLETED | OUTPATIENT
Start: 2022-06-13 | End: 2022-06-13

## 2022-06-13 RX ORDER — ACETAMINOPHEN 325 MG/1
650 TABLET ORAL
Status: COMPLETED | OUTPATIENT
Start: 2022-06-13 | End: 2022-06-13

## 2022-06-13 RX ADMIN — ACETAMINOPHEN 650 MG: 325 TABLET ORAL at 18:06

## 2022-06-13 RX ADMIN — TETANUS AND DIPHTHERIA TOXOIDS ADSORBED 0.5 ML: 2; 2 INJECTION INTRAMUSCULAR at 18:06

## 2022-06-13 ASSESSMENT — PAIN SCALES - GENERAL
PAINLEVEL_OUTOF10: 2
PAINLEVEL_OUTOF10: 8

## 2022-06-13 ASSESSMENT — PAIN DESCRIPTION - LOCATION: LOCATION: HEAD

## 2022-06-13 ASSESSMENT — PAIN - FUNCTIONAL ASSESSMENT: PAIN_FUNCTIONAL_ASSESSMENT: 0-10

## 2022-06-13 NOTE — ED TRIAGE NOTES
Pt arrived via EMS with c/o trip and fall. Pt has laceration to her forehead. Denies LOC or blood thinners. Bleeding controlled at this time.

## 2022-06-13 NOTE — ED NOTES
Discharge instructions reviewed with pt. Pt verbalized understanding. VSS. Pt escorted to lobby via wheelchair with family escort.      Kaye Donato RN  06/1940

## 2022-06-13 NOTE — ED PROVIDER NOTES
[employer] Emergency Department Provider Note                   PCP:                Radha Thibodeaux MD               Age: 80 y.o. Sex: female       ICD-10-CM    1. Fall, initial encounter  [address] XXXA    2. Laceration of head without foreign body, unspecified part of head, initial encounter  [address]01. 91XA        DISPOSITION Decision To Discharge 06/13/2022 07:17:23 PM       Discharge Medication List as of 6/13/2022  7:26 PM          Orders Placed This Encounter   Procedures    LACERATION REPAIR    CT HEAD WO CONTRAST    CT CERVICAL SPINE WO CONTRAST        BEULAH Casanova 11:03 PM      MDM  Number of Diagnoses or Management Options  Fall, initial encounter  Laceration of head without foreign body, unspecified part of head, initial encounter  Diagnosis management comments: Differential diagnoses: Head injury, intracranial hemorrhage, skull fracture, C-spine injury    Patient's imaging is unremarkable. I sutured patient's wound and will have her return in 7 to 10 days for suture removal.       Amount and/or Complexity of Data Reviewed  Tests in the radiology section of CPT®: reviewed    Risk of Complications, Morbidity, and/or Mortality  Presenting problems: moderate  Diagnostic procedures: moderate  Management options: moderate    Patient Progress  Patient progress: improved       Torri Hoang is a 80 y.o. female who presents to the Emergency Department with chief complaint of    Chief Complaint   Patient presents with    Fall      Presents to the emergency department for a trip and fall injury. She states she was leaving a laundromat when she fell forward and struck her head against the edge of a brick wall. Patient did not lose consciousness. She denies taking any prescription blood thinners. She denies any headache, dizziness, drowsiness, nausea or vomiting. She denies any neck or back pain.   She sustained a large jagged laceration to her upper forehead and scalp region that is actively bleeding. EMS applied a pressure dressing. Patient does not think her tetanus is up-to-date and will be given 1 here. Prior to the fall patient denied having any chest pain, shortness of breath, dizziness or weakness          All other systems reviewed and are negative. Review of Systems   Neurological:        Minimal frontal headache   All other systems reviewed and are negative. Past Medical History:   Diagnosis Date    Arthritis     Asthma exacerbation     hospitalization 2004    Bilateral arm fractures 2013    GERD (gastroesophageal reflux disease)     Hiatal hernia     Menopause     Morbid obesity ([address]         Past Surgical History:   Procedure Laterality Date    HYSTERECTOMY  1960's    ORTHOPEDIC SURGERY  2013    arm    TOTAL KNEE ARTHROPLASTY Right 2006    TOTAL KNEE ARTHROPLASTY Left 2005        Family History   Problem Relation Age of Onset    Lung Disease Neg Hx            Social Connections:     Frequency of Communication with Friends and Family: Not on file    Frequency of Social Gatherings with Friends and Family: Not on file    Attends Rastafarian Services: Not on file    Active Member of Clubs or Organizations: Not on file    Attends Club or Organization Meetings: Not on file    Marital Status: Not on file        Allergies   Allergen Reactions    Azithromycin Other (See Comments)    Codeine Other (See Comments)    Sulfa Antibiotics Other (See Comments)        Vitals signs and nursing note reviewed. Patient Vitals for the past 4 hrs:   Temp Pulse Resp BP SpO2   06/13/22 1918 98.7 °F (37.1 °C) 81 18 (!) 162/98 98 %          Physical Exam  Vitals and nursing note reviewed. HENT:      Head:      Comments: Has a hematoma and a large jagged macerated laceration with surrounding abrasion to the upper forehead and scalp region. Wound is approximately 5 cm in length that will need closure.      Right Ear: Tympanic membrane normal.      Left Ear: Tympanic membrane normal. Nose: Nose normal.      Mouth/Throat:      Mouth: Mucous membranes are moist.   Eyes:      Extraocular Movements: Extraocular movements intact. Pupils: Pupils are equal, round, and reactive to light. Cardiovascular:      Rate and Rhythm: Normal rate and regular rhythm. Pulses: Normal pulses. Heart sounds: Normal heart sounds. Pulmonary:      Effort: Pulmonary effort is normal.      Breath sounds: Normal breath sounds. Abdominal:      General: Abdomen is flat. There is no distension. Palpations: Abdomen is soft. Tenderness: There is no abdominal tenderness. There is no guarding. Musculoskeletal:         General: Normal range of motion. Cervical back: Normal range of motion and neck supple. No tenderness. Skin:     General: Skin is warm and dry. Capillary Refill: Capillary refill takes less than 2 seconds. Comments: 5 cm jagged and macerated laceration to the upper forehead and scalp region with larger circular area of abrasion and ecchymosis   Neurological:      General: No focal deficit present. Mental Status: She is alert. Psychiatric:         Mood and Affect: Mood normal.         Behavior: Behavior normal.         Thought Content: Thought content normal.          Lac Repair    Date/Time: 6/13/2022 7:22 PM  Performed by: BEULAH Gomez  Authorized by: Mlily Keating MD     Consent:     Consent obtained:  Verbal    Consent given by:  Patient    Risks discussed:  Pain, poor cosmetic result and poor wound healing  Anesthesia (see MAR for exact dosages):      Anesthesia method:  Local infiltration    Local anesthetic:  Lidocaine 1% WITH epi  Laceration details:     Location:  Face (Patient has a 5 cm jagged and irregular macerated wound to the upper forehead and scalp region)    Length (cm):  5  Repair type:     Repair type:  Simple  Pre-procedure details:     Preparation:  Patient was prepped and draped in usual sterile fashion and imaging obtained to evaluate for foreign bodies  Exploration:     Hemostasis achieved with:  Epinephrine    Contaminated: no    Treatment:     Area cleansed with:  Stella    Amount of cleaning:  Standard    Irrigation solution:  Sterile saline    Irrigation method:  Syringe    Visualized foreign bodies/material removed: no    Skin repair:     Repair method:  Sutures    Suture size:  5-0    Suture material:  Prolene    Number of sutures:  7  Approximation:     Approximation:  Loose  Post-procedure details:     Dressing:  Non-adherent dressing  Comments:      Patient has a irregular wound about 5 cm in length with multiple flaps and macerated tissue. I placed 7 sutures to try to tack the wound down and to achieve hemostasis. There is underlying road rash/abrasion in which a lot of the tissue is missing. Labs Reviewed - No data to display     CT HEAD WO CONTRAST   Final Result   Frontal scalp hematoma without acute intracranial hemorrhage. CT CERVICAL SPINE WO CONTRAST   Final Result   Degenerative change present in the cervical spine. No displaced   cervical spine fracture. Voice dictation software was used during the making of this note. This software is not perfect and grammatical and other typographical errors may be present. This note has not been completely proofread for errors.        Ashley Majano, [place]  06/13/22 9238

## 2022-06-13 NOTE — Clinical Note
Jessica Ornelas was seen and treated in our emergency department on 6/13/2022. She may return to work on 06/16/2022. If you have any questions or concerns, please don't hesitate to call.       BEULAH Sampson

## 2022-06-13 NOTE — ED NOTES
Pt wound wrapped with nonadherent gauze. Extra supplies given to pt.      Shayla Ceballos RN  06/13/22 1925

## 2022-06-23 ENCOUNTER — HOSPITAL ENCOUNTER (EMERGENCY)
Age: 82
Discharge: HOME | End: 2022-06-23
Attending: EMERGENCY MEDICINE
Payer: COMMERCIAL

## 2022-06-23 VITALS
HEART RATE: 73 BPM | DIASTOLIC BLOOD PRESSURE: 76 MMHG | SYSTOLIC BLOOD PRESSURE: 163 MMHG | RESPIRATION RATE: 16 BRPM | TEMPERATURE: 98.8 F | OXYGEN SATURATION: 95 %

## 2022-06-23 DIAGNOSIS — Z48.02 VISIT FOR SUTURE REMOVAL: Primary | ICD-10-CM

## 2022-06-23 PROCEDURE — 99282 EMERGENCY DEPT VISIT SF MDM: CPT

## 2022-06-23 ASSESSMENT — ENCOUNTER SYMPTOMS
COUGH: 0
SHORTNESS OF BREATH: 0
NAUSEA: 0

## 2022-06-23 NOTE — ED PROVIDER NOTES
[facility] Emergency Department Provider Note                   PCP:                Rafael Loya MD               Age: 80 y.o. Sex: female       ICD-10-CM    1. Visit for suture removal  Z48.02        DISPOSITION         New Prescriptions    No medications on file       Orders Placed This Encounter   Procedures   CIPRIANO Valentin 53        BEULAH Weaver 3:17 PM      MDM  Number of Diagnoses or Management Options  Diagnosis management comments: Sutures removed without incidence. See procedure note. Advised aftercare at home and patient verbalized understanding. Amount and/or Complexity of Data Reviewed  Clinical lab tests: ordered and reviewed    Risk of Complications, Morbidity, and/or Mortality  Presenting problems: low  Diagnostic procedures: low  Management options: low         Mary Kellie is a 80 y.o. female who presents to the Emergency Department with chief complaint of    Chief Complaint   Patient presents with    Suture / Staple Removal      Patient is a 82-year-old female who presents to the emergency room with chief complaint of needing sutures removed. She was seen here approximate 10 days ago after a fall. She has had an unremarkable healing course. Denies any problems. Review of Systems   Constitutional: Negative for chills. Respiratory: Negative for cough and shortness of breath. Gastrointestinal: Negative for nausea. All other systems reviewed and are negative.       Past Medical History:   Diagnosis Date    Arthritis     Asthma exacerbation     hospitalization 2004    Bilateral arm fractures 2013    GERD (gastroesophageal reflux disease)     Hiatal hernia     Menopause     Morbid obesity ([address]         Past Surgical History:   Procedure Laterality Date    HYSTERECTOMY  1960's    ORTHOPEDIC SURGERY  2013    arm    TOTAL KNEE ARTHROPLASTY Right 2006    TOTAL KNEE ARTHROPLASTY Left 2005        Family History   Problem Relation Age of Onset    Lung Disease Neg Hx            Social Connections:     Frequency of Communication with Friends and Family: Not on file    Frequency of Social Gatherings with Friends and Family: Not on file    Attends [employer] Services: Not on file    Active Member of Clubs or Organizations: Not on file    Attends Club or Organization Meetings: Not on file    Marital Status: Not on file        Allergies   Allergen Reactions    Azithromycin Other (See Comments)    Codeine Other (See Comments)    Sulfa Antibiotics Other (See Comments)        Vitals signs and nursing note reviewed. Patient Vitals for the past 4 hrs:   Temp Pulse Resp BP SpO2   06/23/22 1419 98.8 °F (37.1 °C) 73 16 (!) 163/76 95 %          Physical Exam     Suture Removal    Date/Time: 6/23/2022 2:59 PM  Performed by: Meryle More, PA  Authorized by: Hailee Kaplan MD     Consent:     Consent obtained:  Verbal    Consent given by:  Patient    Risks discussed:  Bleeding, pain and wound separation    Alternatives discussed:  No treatment  Location:     Location:  Head/neck    Head/neck location:  Scalp  Procedure details:     Wound appearance:  No signs of infection, good wound healing and clean    Number of sutures removed:  7  Post-procedure details:     Post-removal:  Dressing applied    Patient tolerance of procedure: Tolerated well, no immediate complications          Labs Reviewed - No data to display     No orders to display                          Voice dictation software was used during the making of this note. This software is not perfect and grammatical and other typographical errors may be present. This note has not been completely proofread for errors.      Meryle More, [place]  06/23/22 [id number]

## 2022-09-06 ENCOUNTER — OFFICE VISIT (OUTPATIENT)
Dept: PULMONOLOGY | Age: 82
End: 2022-09-06
Payer: COMMERCIAL

## 2022-09-06 VITALS
BODY MASS INDEX: 34.16 KG/M2 | WEIGHT: 205 LBS | TEMPERATURE: 96.8 F | HEIGHT: 65 IN | OXYGEN SATURATION: 96 % | DIASTOLIC BLOOD PRESSURE: 86 MMHG | HEART RATE: 78 BPM | SYSTOLIC BLOOD PRESSURE: 134 MMHG

## 2022-09-06 DIAGNOSIS — K21.9 GASTRO-ESOPHAGEAL REFLUX DISEASE WITHOUT ESOPHAGITIS: ICD-10-CM

## 2022-09-06 DIAGNOSIS — J30.89 NON-SEASONAL ALLERGIC RHINITIS DUE TO OTHER ALLERGIC TRIGGER: ICD-10-CM

## 2022-09-06 DIAGNOSIS — J45.40 MODERATE PERSISTENT ASTHMA WITHOUT COMPLICATION: Primary | ICD-10-CM

## 2022-09-06 LAB
EXPIRATORY TIME: NORMAL
FEF 25-75% %PRED-PRE: NORMAL
FEF 25-75% PRED: NORMAL
FEF 25-75%-PRE: NORMAL
FEV1 %PRED-PRE: 43 %
FEV1 PRED: NORMAL
FEV1/FVC %PRED-PRE: NORMAL
FEV1/FVC PRED: NORMAL
FEV1/FVC: 63 %
FEV1: 0.89 L
FVC %PRED-PRE: 51 %
FVC PRED: NORMAL
FVC: 1.42 L
PEF %PRED-PRE: NORMAL
PEF PRED: NORMAL
PEF-PRE: NORMAL

## 2022-09-06 PROCEDURE — 99213 OFFICE O/P EST LOW 20 MIN: CPT | Performed by: NURSE PRACTITIONER

## 2022-09-06 PROCEDURE — 1123F ACP DISCUSS/DSCN MKR DOCD: CPT | Performed by: NURSE PRACTITIONER

## 2022-09-06 PROCEDURE — 94010 BREATHING CAPACITY TEST: CPT | Performed by: NURSE PRACTITIONER

## 2022-09-06 RX ORDER — ALBUTEROL SULFATE 90 UG/1
AEROSOL, METERED RESPIRATORY (INHALATION)
Qty: 18 G | Refills: 11 | Status: SHIPPED | OUTPATIENT
Start: 2022-09-06

## 2022-09-06 RX ORDER — FLUTICASONE PROPIONATE AND SALMETEROL 250; 50 UG/1; UG/1
POWDER RESPIRATORY (INHALATION)
Qty: 1 EACH | Refills: 11 | Status: SHIPPED | OUTPATIENT
Start: 2022-09-06

## 2022-09-06 RX ORDER — ALBUTEROL SULFATE 2.5 MG/3ML
SOLUTION RESPIRATORY (INHALATION)
Qty: 360 ML | Refills: 11 | Status: SHIPPED | OUTPATIENT
Start: 2022-09-06

## 2022-09-06 ASSESSMENT — ENCOUNTER SYMPTOMS
SHORTNESS OF BREATH: 1
CHEST TIGHTNESS: 0
COUGH: 0
WHEEZING: 0

## 2022-09-06 ASSESSMENT — PULMONARY FUNCTION TESTS
FVC: 1.42
FVC_PERCENT_PREDICTED_PRE: 51
FEV1/FVC: 63
FEV1: 0.89
FEV1_PERCENT_PREDICTED_PRE: 43

## 2022-09-06 NOTE — PROGRESS NOTES
[address] [facility]. [address], [address] [facility]  [phone]    Patient Name:  Nohelia Narayan    YOB: 1940    Office Visit 2022      CHIEF COMPLAINT:      Chief Complaint   Patient presents with    Asthma    Allergic Rhinitis     Follow-up         ASSESSMENT:   (Medical Decision Making)                                                                                                                                          Encounter Diagnoses   Name Primary? Moderate persistent asthma without complication Yes    Non-seasonal allergic rhinitis due to other allergic trigger     Gastro-esophageal reflux disease without esophagitis      She is stable symptomatically and compliant with maintenance therapy for airways disease. There has been slight decline in FEV1. Needs refills of all respiratory Rx. She reports good control of allergy symptoms. ,  Compliant with Accolate    Reports good control of acid reflux, compliant with Nexium. PLAN:     Continue Advair 1 inhalation twice daily, rinse mouth after use. Albuterol inhaler or nebulizer 4 times daily as needed. Continue Accolate twice daily. Continue Nexium. She is up-to-date on COVID vaccines. Advised to have flu vaccine. She will check with pharmacy/other provider to determine if she has had Prevnar 13 or Pneumovax. If she has not, at this point would recommend Prevnar 20 and prescription is provided. She is up-to-date on PPSV23 Pneumovax. Follow-up and Dispositions    Return in about 6 months (around 3/6/2023) for MD or Hilary Ray, asthma, PFT's.            Orders Placed This Encounter    Spirometry Without Bronchodilator    pneumococcal 20-valent conjugat (PREVNAR) 0.5 ML MICHELLE inj     Sig: Inject 0.5 mLs into the muscle once for 1 dose     Dispense:  1 each     Refill:  0    albuterol (PROVENTIL) (2.5 MG/3ML) 0.083% nebulizer solution     Si vial via nebulizer qid prn Dx - asthma J45.909. Lisha Quintanilla Patient will call when refills are needed     Dispense:  360 mL     Refill:  11    albuterol sulfate HFA (PROVENTIL;VENTOLIN;PROAIR) 108 (90 Base) MCG/ACT inhaler     Si puffs 4 times daily if needed for shortness of breath or wheezing     Dispense:  18 g     Refill:  11    fluticasone-salmeterol (ADVAIR DISKUS) 250-50 MCG/ACT AEPB diskus inhaler     Si inhalation twice daily, rinse mouth after use     Dispense:  1 each     Refill:  11           NAVJOT MARSHALL - STEFANI    Total  time spent with patient - 32 min. Collaborating MD: Dr. Charisse Naranjo:  She is a very pleasant 80year-old female with history of asthma, allergic rhinitis, seen today for routine follow-up. Today, she reports that she has been stable from a respiratory standpoint. She denies any significant change in exertional dyspnea. She denies wheezing. She has minimal cough, attributes it to postnasal drainage. She is compliant with Advair twice daily. Uses albuterol inhaler intermittently with good response. She had 1 episode of significant shortness of breath a few weeks ago for which she required nebulizer treatment, and had good response. Allergy symptoms are currently controlled with Accolate. She is up-to-date on COVID vaccines, PPSV23 pneumonia vaccine. Previously given prescription for Prevnar 13 but does not recall having it at her pharmacy. DIAGNOSTICS:       PSG . CXR , 2006, , . Spirometry , , , . . CXR 2012, spirometry 2012. Spirometry 3/18/15 - moderate Restrictive defect, no significant interval change compared with . Spirometry 2015 - moderate obstructive defect, slight, but statistically insignificant decline in FVC and FEV1. Spirometry 3/28/2016 - moderate obstructive defect with decreased FVC, no significant interval change.   Spirometry 2017-moderate restrictive defect, no significant interval change. Scooping of expiratory loop consistent with obstructive pattern. Spirometry 9/20/2019- moderate restrictive defect, no significant interval change. Scooping of expiratory loop consistent with obstructive pattern. Spirometry 9/6/2022-moderately severe restrictive defect with obstructive pattern to expiratory loop. There has been no significant change in FVC, slight decline in FEV1.    _____________________________________________________________      REVIEW OF SYSTEMS:    Review of Systems   Constitutional:  Negative for chills, fatigue, fever and unexpected weight change. Respiratory:  Positive for shortness of breath. Negative for cough, chest tightness and wheezing. Cardiovascular:  Negative for chest pain, palpitations and leg swelling. PHYSICAL EXAM:    Vitals:    09/06/22 1431   BP: 134/86   Pulse: 78   Temp: 96.8 °F (36 °C)   TempSrc: Skin   SpO2: 96%   Weight: 205 lb (93 kg)   Height: 5' 5\" (1.651 m)        Body mass index is 34.11 kg/m². GENERAL APPEARANCE:  The patient is obese and in no respiratory distress. HEENT:  PERRL. Conjunctivae unremarkable. NECK/LYMPHATIC:   Symmetrical with no elevation of jugular venous pulsation. Trachea midline. LUNGS:   Normal respiratory effort with symmetrical lung expansion. Breath sounds-decreased but clear. Erleen Cartwright HEART:   There is a normal rate and regular rhythm. No murmur, rub, or gallop. There is no edema in the lower extremities. NEURO:  The patient is alert and oriented to person, place, and time. Memory appears intact and mood is normal.  No gross sensorimotor deficits are present. DIAGNOSTIC TESTS: Studies were personally reviewed by me and discussed with the patient.     Spirometry:        Office Spirometry Results Latest Ref Rng & Units 9/6/2022   FVC L 1.42   FEV1 L 0.89   FEV1 %PRED-PRE % 43   FVC %PRED-PRE % 51   FEV1/FVC % 63           Past Medical History:   Diagnosis Date    Arthritis Asthma exacerbation     hospitalization 2004    Bilateral arm fractures 2013    GERD (gastroesophageal reflux disease)     Hiatal hernia     Menopause     Morbid obesity ([address]        Patient Active Problem List   Diagnosis    Gastro-esophageal reflux disease without esophagitis    CHANELLE (obstructive sleep apnea)    S/P total hip arthroplasty    Moderate persistent asthma without complication    Non-seasonal allergic rhinitis    Severe obesity with body mass index (BMI) of 35.0 to 39.9 with serious comorbidity ([address]    Primary osteoarthritis involving multiple joints       Past Surgical History:   Procedure Laterality Date    HYSTERECTOMY (CERVIX STATUS UNKNOWN)  1960's    ORTHOPEDIC SURGERY  2013    arm    TOTAL KNEE ARTHROPLASTY Right 2006    TOTAL KNEE ARTHROPLASTY Left 2005         Social History     Socioeconomic History    Marital status:      Spouse name: None    Number of children: None    Years of education: None    Highest education level: None   Tobacco Use    Smoking status: Never    Smokeless tobacco: Never   Substance and Sexual Activity    Alcohol use: No    Drug use: No   Social History Narrative    Lifelong nonsmoker, negative for EtOH use. She is a . She has always lived in this general area. No history of toxic industrial, environmental exposure. 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    .       Family History   Problem Relation Age of Onset    Lung Disease Neg Hx        Allergies   Allergen Reactions    Azithromycin Other (See Comments)    Codeine Other (See Comments)    Sulfa Antibiotics Other (See Comments)       Current Outpatient Medications   Medication Sig    albuterol sulfate  (90 Base) MCG/ACT inhaler 2 puffs qid prn    albuterol (PROVENTIL) (2.5 MG/3ML) 0.083% nebulizer solution 1 vial via nebulizer qid prn Dx - asthma J45.909    esomeprazole (NEXIUM) 20 MG delayed release capsule Take 20 mg by mouth daily fluticasone-salmeterol (ADVAIR) 250-50 MCG/DOSE AEPB INHALE 1 PUFF BY MOUTH TWO TIMES A DAY RINSE MOUTH AFTER USE    zafirlukast (ACCOLATE) 20 MG tablet TAKE ONE TABLET BY MOUTH TWO TIMES A DAY     No current facility-administered medications for this visit. Over 50% of today's office visit was spent in face to face time reviewing test results, prognosis, importance of compliance, education about disease process, benefits of medications, instructions for management of acute symptoms, and follow up plans. Electronically signed. Dictated using voice recognition software.   Proof read but unrecognized errors may exist.

## 2022-09-06 NOTE — PATIENT INSTRUCTIONS
Continue Advair 1 inhalation twice daily, rinse mouth after use. Albuterol inhaler or nebulizer 4 times daily as needed. Continue Accolate twice daily. Continue Nexium. She is up-to-date on COVID vaccines. Advised to have flu vaccine. She will check with pharmacy/other provider to determine if she has had Prevnar 13 or Pneumovax. If she has not, at this point would recommend Prevnar 20 and prescription is provided. She is up-to-date on PPSV23 Pneumovax.

## 2022-11-01 ENCOUNTER — OFFICE VISIT (OUTPATIENT)
Dept: FAMILY MEDICINE CLINIC | Facility: CLINIC | Age: 82
End: 2022-11-01
Payer: COMMERCIAL

## 2022-11-01 VITALS
DIASTOLIC BLOOD PRESSURE: 86 MMHG | SYSTOLIC BLOOD PRESSURE: 134 MMHG | HEART RATE: 95 BPM | BODY MASS INDEX: 34.32 KG/M2 | OXYGEN SATURATION: 96 % | WEIGHT: 206 LBS | HEIGHT: 65 IN

## 2022-11-01 DIAGNOSIS — Z78.0 POST-MENOPAUSAL: ICD-10-CM

## 2022-11-01 DIAGNOSIS — Z23 ENCOUNTER FOR IMMUNIZATION: ICD-10-CM

## 2022-11-01 DIAGNOSIS — R74.8 ABNORMAL LEVELS OF OTHER SERUM ENZYMES: Primary | ICD-10-CM

## 2022-11-01 DIAGNOSIS — E78.00 PURE HYPERCHOLESTEROLEMIA: ICD-10-CM

## 2022-11-01 DIAGNOSIS — Z12.31 SCREENING MAMMOGRAM, ENCOUNTER FOR: ICD-10-CM

## 2022-11-01 LAB
ALBUMIN SERPL-MCNC: 3.1 G/DL (ref 3.2–4.6)
ALBUMIN/GLOB SERPL: 0.9 {RATIO} (ref 0.4–1.6)
ALP SERPL-CCNC: 101 U/L (ref 50–136)
ALT SERPL-CCNC: 21 U/L (ref 12–65)
ANION GAP SERPL CALC-SCNC: 5 MMOL/L (ref 2–11)
AST SERPL-CCNC: 15 U/L (ref 15–37)
BASOPHILS # BLD: 0 K/UL (ref 0–0.2)
BASOPHILS NFR BLD: 1 % (ref 0–2)
BILIRUB SERPL-MCNC: 0.5 MG/DL (ref 0.2–1.1)
BUN SERPL-MCNC: 9 MG/DL (ref 8–23)
CALCIUM SERPL-MCNC: 9 MG/DL (ref 8.3–10.4)
CHLORIDE SERPL-SCNC: 103 MMOL/L (ref 101–110)
CHOLEST SERPL-MCNC: 196 MG/DL
CO2 SERPL-SCNC: 30 MMOL/L (ref 21–32)
CREAT SERPL-MCNC: 0.8 MG/DL (ref 0.6–1)
DIFFERENTIAL METHOD BLD: NORMAL
EOSINOPHIL # BLD: 0.3 K/UL (ref 0–0.8)
EOSINOPHIL NFR BLD: 6 % (ref 0.5–7.8)
ERYTHROCYTE [DISTWIDTH] IN BLOOD BY AUTOMATED COUNT: 13.9 % (ref 11.9–14.6)
GLOBULIN SER CALC-MCNC: 3.6 G/DL (ref 2.8–4.5)
GLUCOSE SERPL-MCNC: 108 MG/DL (ref 65–100)
HCT VFR BLD AUTO: 40.3 % (ref 35.8–46.3)
HDLC SERPL-MCNC: 58 MG/DL (ref 40–60)
HDLC SERPL: 3.4 {RATIO}
HGB BLD-MCNC: 12.8 G/DL (ref 11.7–15.4)
IMM GRANULOCYTES # BLD AUTO: 0 K/UL (ref 0–0.5)
IMM GRANULOCYTES NFR BLD AUTO: 0 % (ref 0–5)
LDLC SERPL CALC-MCNC: 115.4 MG/DL
LYMPHOCYTES # BLD: 1.6 K/UL (ref 0.5–4.6)
LYMPHOCYTES NFR BLD: 30 % (ref 13–44)
MCH RBC QN AUTO: 29.3 PG (ref 26.1–32.9)
MCHC RBC AUTO-ENTMCNC: 31.8 G/DL (ref 31.4–35)
MCV RBC AUTO: 92.2 FL (ref 82–102)
MONOCYTES # BLD: 0.5 K/UL (ref 0.1–1.3)
MONOCYTES NFR BLD: 10 % (ref 4–12)
NEUTS SEG # BLD: 2.8 K/UL (ref 1.7–8.2)
NEUTS SEG NFR BLD: 53 % (ref 43–78)
NRBC # BLD: 0 K/UL (ref 0–0.2)
PLATELET # BLD AUTO: 267 K/UL (ref 150–450)
PMV BLD AUTO: 10.4 FL (ref 9.4–12.3)
POTASSIUM SERPL-SCNC: 4.2 MMOL/L (ref 3.5–5.1)
PROT SERPL-MCNC: 6.7 G/DL (ref 6.3–8.2)
RBC # BLD AUTO: 4.37 M/UL (ref 4.05–5.2)
SODIUM SERPL-SCNC: 138 MMOL/L (ref 133–143)
TRIGL SERPL-MCNC: 113 MG/DL (ref 35–150)
VLDLC SERPL CALC-MCNC: 22.6 MG/DL (ref 6–23)
WBC # BLD AUTO: 5.4 K/UL (ref 4.3–11.1)

## 2022-11-01 PROCEDURE — 90471 IMMUNIZATION ADMIN: CPT | Performed by: FAMILY MEDICINE

## 2022-11-01 PROCEDURE — 99214 OFFICE O/P EST MOD 30 MIN: CPT | Performed by: FAMILY MEDICINE

## 2022-11-01 PROCEDURE — 1123F ACP DISCUSS/DSCN MKR DOCD: CPT | Performed by: FAMILY MEDICINE

## 2022-11-01 PROCEDURE — 90694 VACC AIIV4 NO PRSRV 0.5ML IM: CPT | Performed by: FAMILY MEDICINE

## 2022-11-01 ASSESSMENT — PATIENT HEALTH QUESTIONNAIRE - PHQ9
SUM OF ALL RESPONSES TO PHQ9 QUESTIONS 1 & 2: 0
SUM OF ALL RESPONSES TO PHQ QUESTIONS 1-9: 0
SUM OF ALL RESPONSES TO PHQ QUESTIONS 1-9: 0
1. LITTLE INTEREST OR PLEASURE IN DOING THINGS: 0
SUM OF ALL RESPONSES TO PHQ QUESTIONS 1-9: 0
2. FEELING DOWN, DEPRESSED OR HOPELESS: 0
SUM OF ALL RESPONSES TO PHQ QUESTIONS 1-9: 0

## 2022-11-01 NOTE — PROGRESS NOTES
Danya Gonzalez is a 80 y.o. female who presents today for the following:  Chief Complaint   Patient presents with    Medication Check       Allergies   Allergen Reactions    Azithromycin Other (See Comments)    Codeine Other (See Comments)    Sulfa Antibiotics Other (See Comments)       Current Outpatient Medications   Medication Sig Dispense Refill    albuterol (PROVENTIL) (2.5 MG/3ML) 0.083% nebulizer solution 1 vial via nebulizer qid prn Dx - asthma J45.909. Loreatha Press Patient will call when refills are needed 360 mL 11    albuterol sulfate HFA (PROVENTIL;VENTOLIN;PROAIR) 108 (90 Base) MCG/ACT inhaler 2 puffs 4 times daily if needed for shortness of breath or wheezing 18 g 11    fluticasone-salmeterol (ADVAIR DISKUS) 250-50 MCG/ACT AEPB diskus inhaler 1 inhalation twice daily, rinse mouth after use 1 each 11    esomeprazole (NEXIUM) 20 MG delayed release capsule Take 20 mg by mouth daily      zafirlukast (ACCOLATE) 20 MG tablet TAKE ONE TABLET BY MOUTH TWO TIMES A DAY       No current facility-administered medications for this visit. Past Medical History:   Diagnosis Date    Arthritis     Asthma exacerbation     hospitalization 2004    Bilateral arm fractures 2013    GERD (gastroesophageal reflux disease)     Hiatal hernia     Menopause     Morbid obesity ([address]        Past Surgical History:   Procedure Laterality Date    HYSTERECTOMY (CERVIX STATUS UNKNOWN)  1960's    ORTHOPEDIC SURGERY  2013    arm    TOTAL KNEE ARTHROPLASTY Right 2006    TOTAL KNEE ARTHROPLASTY Left 2005       Social History     Tobacco Use    Smoking status: Never    Smokeless tobacco: Never   Substance Use Topics    Alcohol use: No        Family History   Problem Relation Age of Onset    Lung Disease Neg Hx         Patient is a 74-year-old female here today for routine follow-up. Patient has a history of asthma which is managed by pulmonary.   Last visit she was found to have a mildly elevated alkaline phosphatase level and was referred to gastroenterology. She was seen by gastroenterology last year. They noted a history of a hiatal hernia and Schatzki's ring for which patient has been on nexium 40mg. Screening colonoscopy and ultrasound were ordered. Review of Systems   Constitutional:  Negative for fatigue and unexpected weight change. Respiratory:  Positive for wheezing. Negative for shortness of breath. Cardiovascular:  Negative for chest pain. Genitourinary:  Negative for difficulty urinating. Neurological:  Negative for dizziness, light-headedness and headaches. /86   Pulse 95   Ht 5' 5\" (1.651 m)   Wt 206 lb (93.4 kg)   SpO2 96%   BMI 34.28 kg/m²     Physical Exam  Constitutional:       Appearance: Normal appearance. Eyes:      General: No scleral icterus. Conjunctiva/sclera: Conjunctivae normal.   Cardiovascular:      Rate and Rhythm: Normal rate and regular rhythm. Heart sounds: Normal heart sounds. Pulmonary:      Effort: Pulmonary effort is normal. No respiratory distress. Breath sounds: Wheezing present. Musculoskeletal:      Right lower leg: No edema. Left lower leg: No edema. Skin:     Findings: No lesion or rash. Neurological:      General: No focal deficit present. Mental Status: She is alert and oriented to person, place, and time. Psychiatric:         Mood and Affect: Mood normal.         Behavior: Behavior normal.        1. Abnormal levels of other serum enzymes  Follow up on blood work. -     CBC with Auto Differential; Future  -     Comprehensive Metabolic Panel; Future  2. Pure hypercholesterolemia  Follow up on labs. No statin previously prescribed. -     Lipid Panel; Future  3. Encounter for immunization  -     Influenza, FLUAD, (age 72 y+), IM, PF, 0.5 mL  4. Post-menopausal  -     DEXA BONE DENSITY AXIAL SKELETON; Future  5. Screening mammogram, encounter for  -     TREY DIGITAL SCREEN W OR WO CAD BILATERAL;  Future     Patient informed, we will call with blood work results within one week. If you have not heard regarding results in over a week, please contact office. You can also review results on [facility].            Sandie Bishop MD

## 2022-11-02 ASSESSMENT — ENCOUNTER SYMPTOMS
SHORTNESS OF BREATH: 0
WHEEZING: 1

## 2022-11-16 NOTE — TELEPHONE ENCOUNTER
Patient says she needs all her prescriptions renewed.  Right now she is completely out of her     zafirlukast (ACCOLATE) 20 MG tablet   1/14/2022     Sig: TAKE ONE TABLET BY MOUTH TWO TIMES A DAY        And has not had for 2 days

## 2022-11-17 RX ORDER — ZAFIRLUKAST 20 MG/1
20 TABLET, FILM COATED ORAL 2 TIMES DAILY
Qty: 180 TABLET | Refills: 3 | Status: SHIPPED | OUTPATIENT
Start: 2022-11-17

## 2022-11-23 RX ORDER — ZAFIRLUKAST 20 MG/1
TABLET, FILM COATED ORAL
Qty: 60 TABLET | Refills: 3 | OUTPATIENT
Start: 2022-11-23

## 2022-12-06 ENCOUNTER — OFFICE VISIT (OUTPATIENT)
Dept: PULMONOLOGY | Age: 82
End: 2022-12-06
Payer: COMMERCIAL

## 2022-12-06 VITALS
WEIGHT: 202 LBS | TEMPERATURE: 97.2 F | HEIGHT: 65 IN | HEART RATE: 97 BPM | DIASTOLIC BLOOD PRESSURE: 92 MMHG | OXYGEN SATURATION: 90 % | SYSTOLIC BLOOD PRESSURE: 144 MMHG | BODY MASS INDEX: 33.66 KG/M2

## 2022-12-06 DIAGNOSIS — J30.89 NON-SEASONAL ALLERGIC RHINITIS DUE TO OTHER ALLERGIC TRIGGER: ICD-10-CM

## 2022-12-06 DIAGNOSIS — J45.41 MODERATE PERSISTENT ASTHMA WITH ACUTE EXACERBATION: Primary | ICD-10-CM

## 2022-12-06 DIAGNOSIS — J20.9 ACUTE BRONCHITIS, UNSPECIFIED ORGANISM: ICD-10-CM

## 2022-12-06 PROCEDURE — 1123F ACP DISCUSS/DSCN MKR DOCD: CPT | Performed by: NURSE PRACTITIONER

## 2022-12-06 PROCEDURE — 99214 OFFICE O/P EST MOD 30 MIN: CPT | Performed by: NURSE PRACTITIONER

## 2022-12-06 PROCEDURE — 96372 THER/PROPH/DIAG INJ SC/IM: CPT | Performed by: NURSE PRACTITIONER

## 2022-12-06 RX ORDER — DOXYCYCLINE HYCLATE 100 MG
100 TABLET ORAL 2 TIMES DAILY
Qty: 14 TABLET | Refills: 0 | Status: SHIPPED | OUTPATIENT
Start: 2022-12-06 | End: 2022-12-13

## 2022-12-06 RX ORDER — DEXAMETHASONE SODIUM PHOSPHATE 10 MG/ML
8 INJECTION INTRAMUSCULAR; INTRAVENOUS ONCE
Status: COMPLETED | OUTPATIENT
Start: 2022-12-06 | End: 2022-12-06

## 2022-12-06 RX ORDER — METHYLPREDNISOLONE 4 MG/1
TABLET ORAL
Qty: 20 TABLET | Refills: 0 | Status: SHIPPED | OUTPATIENT
Start: 2022-12-06

## 2022-12-06 RX ADMIN — DEXAMETHASONE SODIUM PHOSPHATE 8 MG: 10 INJECTION INTRAMUSCULAR; INTRAVENOUS at 15:18

## 2022-12-06 ASSESSMENT — ENCOUNTER SYMPTOMS
SPUTUM PRODUCTION: 1
SHORTNESS OF BREATH: 1
COUGH: 1
HEMOPTYSIS: 0
WHEEZING: 0

## 2022-12-06 NOTE — PROGRESS NOTES
[address] [facility]. [address], [address] [facility]  [phone]    Patient Name:  Alan Sweeney    YOB: 1940    Office Visit 12/6/2022      CHIEF COMPLAINT:      Chief Complaint   Patient presents with    Other     Work-in         ASSESSMENT:   (Medical Decision Making)                                                                                                                                          Encounter Diagnoses   Name Primary? Moderate persistent asthma with acute exacerbation Yes    Acute bronchitis, unspecified organism     Non-seasonal allergic rhinitis due to other allergic trigger      Recent onset (4 days ago) of wheezing, shortness of breath, coughing. She had good response to nebulizer for 1 to 2 days but had nocturnal wheezing during the night prompting her to use her nebulizer. She has cough productive of yellow sputum. She denies fever, chills, night sweats or hemoptysis. She reports compliance with Advair, Accolate, Nexium. She has had initial COVID vaccines and initial boosters but has not yet had newest booster. She has had flu vaccine. She has had PPSV23 but has not yet had Prevnar 20-prescription provided at last appointment. PLAN:     Dexamethasone 8 mg IM was administered. Modified dose of oral steroid due to difficulty tolerating high doses of prednisone-we will use Medrol 4 mg -4 tablets for 2 days, 3 tablets for 2 days, 2 tablets for 2 days, 1 for 2 days then stop. Doxycycline 100 mg twice daily for 7 days. Advised to use albuterol via nebulizer or inhaler 4 times daily until she has improved to baseline. OTC mucolytic, (mucinex or generic equivalent of guaifenesin), 2400mg in 24 hours in divided doses as needed to thin mucous. Continue Advair 1 inhalation twice daily, Accolate as prescribed.     She will contact us if she fails to improve, develops fever, chills, worsening shortness of breath or wheezing. Also discussed she may need to consider having COVID or flu test or urgent care if her symptoms worsen. Otherwise, she will follow-up as previously scheduled. Prevnar 20 pneumonia vaccine when she has improved. Orders Placed This Encounter    dexamethasone (DECADRON) injection 8 mg    methylPREDNISolone (MEDROL) 4 MG tablet     Si po q am pc x 2 days, 3 for 2 days, 2 for 2 days, 1 for 2 days, then stop     Dispense:  20 tablet     Refill:  0    doxycycline hyclate (VIBRA-TABS) 100 MG tablet     Sig: Take 1 tablet by mouth 2 times daily for 7 days     Dispense:  14 tablet     Refill:  0             ALMA ABIGAIL CASTILLO, APRN - CNP    Total  time spent with patient - 38 min. Collaborating MD: Dr. Edgar Carvajal:    She is an 14-year-old female with history of moderate persistent asthma, allergic rhinitis, GERD who was seen today at her request secondary to increased shortness of breath and desiring steroid injection. Today, she reports Recent onset (4 days ago) of wheezing, shortness of breath, coughing. She had good response to nebulizer for 1 to 2 days but had nocturnal wheezing during the night prompting her to use her nebulizer. She has cough productive of yellow sputum. She denies fever, night sweats or hemoptysis. 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 She has had initial COVID vaccines, initial boosters but has not had newest booster. She is up-to-date on flu vaccine. She was given prescription at her last visit for Prevnar 20 but was advised by her pharmacy that she needed to return for future visit for vaccine. DIAGNOSTICS:       PSG . CXR , 2006, , . Spirometry , , 2007, . 2011. CXR 2012, spirometry 2012. Spirometry 3/18/15 - moderate Restrictive defect, no significant interval change compared with .     Spirometry 2015 - moderate obstructive defect, slight, but statistically insignificant decline in FVC and FEV1. Spirometry 3/28/2016 - moderate obstructive defect with decreased FVC, no significant interval change. Spirometry 7/19/2017-moderate restrictive defect, no significant interval change. Scooping of expiratory loop consistent with obstructive pattern. Spirometry 9/20/2019- moderate restrictive defect, no significant interval change. Scooping of expiratory loop consistent with obstructive pattern. Spirometry 9/6/2022-moderately severe restrictive defect with obstructive pattern to expiratory loop. There has been no significant change in FVC, slight decline in FEV1.    _____________________________________________________________      REVIEW OF SYSTEMS:    Review of Systems   Constitutional: Positive for fever. Negative for night sweats. ? Chills last night   Respiratory:  Positive for cough, shortness of breath and sputum production. Negative for hemoptysis and wheezing. PHYSICAL EXAM:    Vitals:    12/06/22 1431   BP: (!) 144/92   Site: Left Upper Arm   Position: Sitting   Cuff Size: Large Adult   Pulse: 97   Temp: 97.2 °F (36.2 °C)   TempSrc: Skin   SpO2: 90%   Weight: 202 lb (91.6 kg)   Height: 5' 5\" (1.651 m)        Body mass index is 33.61 kg/m². GENERAL APPEARANCE:  The patient is obese and in no respiratory distress. HEENT:  PERRL. Conjunctivae unremarkable. NECK/LYMPHATIC:   Symmetrical with no elevation of jugular venous pulsation. Trachea midline. LUNGS:   Normal respiratory effort with symmetrical lung expansion. Breath sounds - decreased, + mild expiratory wheeze, no rhonchi or crackles. HEART:   There is a normal rate and regular rhythm. No murmur, rub, or gallop. There is no edema in the lower extremities. NEURO:  The patient is alert and oriented to person, place, and time. Memory appears intact and mood is normal.  No gross sensorimotor deficits are present.       DIAGNOSTIC TESTS: Studies were personally reviewed by me and discussed with the patient. Spirometry:    Office Spirometry Results Latest Ref Rng & Units 9/6/2022   FVC L 1.42   FEV1 L 0.89   FEV1 %PRED-PRE % 43   FVC %PRED-PRE % 51   FEV1/FVC % 63       Past Medical History:   Diagnosis Date    Arthritis     Asthma exacerbation     hospitalization 2004    Bilateral arm fractures 2013    GERD (gastroesophageal reflux disease)     Hiatal hernia     Menopause     Morbid obesity ([address]        Patient Active Problem List   Diagnosis    Gastro-esophageal reflux disease without esophagitis    CHANELLE (obstructive sleep apnea)    S/P total hip arthroplasty    Moderate persistent asthma without complication    Non-seasonal allergic rhinitis    Severe obesity with body mass index (BMI) of 35.0 to 39.9 with serious comorbidity ([address]    Primary osteoarthritis involving multiple joints       Past Surgical History:   Procedure Laterality Date    HYSTERECTOMY (CERVIX STATUS UNKNOWN)  1960's    ORTHOPEDIC SURGERY  2013    arm    TOTAL KNEE ARTHROPLASTY Right 2006    TOTAL KNEE ARTHROPLASTY Left 2005         Social History     Socioeconomic History    Marital status:      Spouse name: None    Number of children: None    Years of education: None    Highest education level: None   Tobacco Use    Smoking status: Never    Smokeless tobacco: Never   Substance and Sexual Activity    Alcohol use: No    Drug use: No   Social History Narrative    Lifelong nonsmoker, negative for EtOH use. She is a . She has always lived in this general area. No history of toxic industrial, environmental exposure. 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    .       Family History   Problem Relation Age of Onset    Lung Disease Neg Hx        Allergies   Allergen Reactions    Azithromycin Other (See Comments)    Codeine Other (See Comments)    Sulfa Antibiotics Other (See Comments)       Current Outpatient Medications   Medication Sig    zafirlukast (ACCOLATE) 20 MG tablet Take 1 tablet by mouth 2 times daily TAKE ONE TABLET BY MOUTH TWO TIMES A DAY    albuterol (PROVENTIL) (2.5 MG/3ML) 0.083% nebulizer solution 1 vial via nebulizer qid prn Dx - asthma J45.909. Elijah Feldman Patient will call when refills are needed    albuterol sulfate HFA (PROVENTIL;VENTOLIN;PROAIR) 108 (90 Base) MCG/ACT inhaler 2 puffs 4 times daily if needed for shortness of breath or wheezing    fluticasone-salmeterol (ADVAIR DISKUS) 250-50 MCG/ACT AEPB diskus inhaler 1 inhalation twice daily, rinse mouth after use    esomeprazole (NEXIUM) 20 MG delayed release capsule Take 20 mg by mouth daily     No current facility-administered medications for this visit. Over 50% of today's office visit was spent in face to face time reviewing test results, prognosis, importance of compliance, education about disease process, benefits of medications, instructions for management of acute symptoms, and follow up plans. Electronically signed. Dictated using voice recognition software.   Proof read but unrecognized errors may exist.

## 2022-12-06 NOTE — PROGRESS NOTES
She is an 44-year-old female with history of moderate persistent asthma, allergic rhinitis, GERD who was seen today at her request secondary to increased shortness of breath and desiring steroid injection. DIAGNOSTICS:       PSG 2005. CXR 2005, 2006, 2008, 2011. Spirometry 2005, 2006, 2007, 2010. 2011. CXR 2/2012, spirometry 2/2012. Spirometry 3/18/15 - moderate Restrictive defect, no significant interval change compared with 2012. Spirometry 9/21/2015 - moderate obstructive defect, slight, but statistically insignificant decline in FVC and FEV1. Spirometry 3/28/2016 - moderate obstructive defect with decreased FVC, no significant interval change. Spirometry 7/19/2017-moderate restrictive defect, no significant interval change. Scooping of expiratory loop consistent with obstructive pattern. Spirometry 9/20/2019- moderate restrictive defect, no significant interval change. Scooping of expiratory loop consistent with obstructive pattern. Spirometry 9/6/2022-moderately severe restrictive defect with obstructive pattern to expiratory loop. There has been no significant change in FVC, slight decline in FEV1.

## 2022-12-06 NOTE — PATIENT INSTRUCTIONS
Dexamethasone 8 mg IM was administered. Modified dose of oral steroid due to difficulty tolerating high doses of prednisone-we will use Medrol 4 mg -4 tablets for 2 days, 3 tablets for 2 days, 2 tablets for 2 days, 1 for 2 days then stop. Doxycycline 100 mg twice daily for 7 days. Advised to use albuterol via nebulizer or inhaler 4 times daily until she has improved to baseline. OTC mucolytic, (mucinex or generic equivalent of guaifenesin), 2400mg in 24 hours in divided doses as needed to thin mucous. Continue Advair 1 inhalation twice daily, Accolate as prescribed. She will contact us if she fails to improve, develops fever, chills, worsening shortness of breath or wheezing. Also discussed she may need to consider having COVID or flu test or urgent care if her symptoms worsen. Otherwise, she will follow-up as previously scheduled.

## 2023-01-23 ENCOUNTER — OFFICE VISIT (OUTPATIENT)
Dept: OCCUPATIONAL MEDICINE | Age: 83
End: 2023-01-23

## 2023-01-23 ENCOUNTER — TELEPHONE (OUTPATIENT)
Dept: OCCUPATIONAL MEDICINE | Age: 83
End: 2023-01-23

## 2023-01-23 VITALS
WEIGHT: 196 LBS | DIASTOLIC BLOOD PRESSURE: 73 MMHG | SYSTOLIC BLOOD PRESSURE: 165 MMHG | HEIGHT: 65 IN | HEART RATE: 98 BPM | RESPIRATION RATE: 19 BRPM | BODY MASS INDEX: 32.65 KG/M2 | TEMPERATURE: 101.8 F | OXYGEN SATURATION: 92 %

## 2023-01-23 DIAGNOSIS — J10.1 INFLUENZA A: Primary | ICD-10-CM

## 2023-01-23 DIAGNOSIS — R05.1 ACUTE COUGH: ICD-10-CM

## 2023-01-23 LAB
INFLUENZA A ANTIGEN, POC: POSITIVE
INFLUENZA B ANTIGEN, POC: NEGATIVE
SARS-COV-2 RNA, POC: NEGATIVE

## 2023-01-23 RX ORDER — OSELTAMIVIR PHOSPHATE 75 MG/1
75 CAPSULE ORAL 2 TIMES DAILY
Qty: 10 CAPSULE | Refills: 0 | Status: SHIPPED | OUTPATIENT
Start: 2023-01-23 | End: 2023-01-28

## 2023-01-23 ASSESSMENT — ENCOUNTER SYMPTOMS
SHORTNESS OF BREATH: 0
SORE THROAT: 0
DIARRHEA: 0
EYE PAIN: 0
CHEST TIGHTNESS: 1
WHEEZING: 1
EYE REDNESS: 0
NAUSEA: 0
ABDOMINAL PAIN: 0
VOMITING: 0
TROUBLE SWALLOWING: 0
RHINORRHEA: 1
COUGH: 1
EYE ITCHING: 0
SINUS PRESSURE: 0
SINUS PAIN: 0
EYE DISCHARGE: 0

## 2023-01-23 NOTE — LETTER
[address] [place] [id number]  Phone: [phone]  Fax: [phone]    NAVJOT Valenzuela NP        January 23, 2023     Patient: Mary Leonardo   YOB: 1940   Date of Visit: 1/23/2023       To Whom It May Concern: It is my medical opinion that Zelalem Malcolm may return to work on Monday, January 30th as long as fever free for 24 hours without antipyretics and feeling better. .    If you have any questions or concerns, please don't hesitate to call.     Sincerely,        NAVJOT Valenzuela NP

## 2023-01-23 NOTE — PROGRESS NOTES
PROGRESS NOTE    SUBJECTIVE     Alin Valencia is a [de-identified] y.o. female seen for:    Chief Complaint    Cough     . Patient was working today and coworkers advised her to get tested for COVID-19 or influenza due to her cough. Patient states that over the weekend she was cleaning her house and felt fine until Sunday (1/22/23) afternoon when she started coughing and it was somewhat productive. Patient has a history of asthma and takes her Advair as prescribed but hadn't needed her Albuterol inhaler for about a month. Patient states that she needed to use her albuterol inhaler twice last night and once this morning. Patient states she thinks her albuterol inhaler is helping her but she wants to go home and use her albuterol nebulizer. Patient denies shortness of breath but notes some chest tightness and wheezing. Current Outpatient Medications   Medication Sig Dispense Refill    oseltamivir (TAMIFLU) 75 MG capsule Take 1 capsule by mouth 2 times daily for 5 days 10 capsule 0    zafirlukast (ACCOLATE) 20 MG tablet Take 1 tablet by mouth 2 times daily TAKE ONE TABLET BY MOUTH TWO TIMES A  tablet 3    albuterol (PROVENTIL) (2.5 MG/3ML) 0.083% nebulizer solution 1 vial via nebulizer qid prn Dx - asthma J45.909. Vero Stands Patient will call when refills are needed 360 mL 11    albuterol sulfate HFA (PROVENTIL;VENTOLIN;PROAIR) 108 (90 Base) MCG/ACT inhaler 2 puffs 4 times daily if needed for shortness of breath or wheezing 18 g 11    fluticasone-salmeterol (ADVAIR DISKUS) 250-50 MCG/ACT AEPB diskus inhaler 1 inhalation twice daily, rinse mouth after use 1 each 11    esomeprazole (NEXIUM) 20 MG delayed release capsule Take 20 mg by mouth daily       No current facility-administered medications for this visit.       Allergies   Allergen Reactions    Azithromycin Other (See Comments)    Codeine Other (See Comments)    Sulfa Antibiotics Other (See Comments)     Past Medical History:   Diagnosis Date    Arthritis     Asthma exacerbation     hospitalization 2004    Bilateral arm fractures 2013    Cancer [facility])     GERD (gastroesophageal reflux disease)     Hiatal hernia     Menopause     Morbid obesity ([address]      Past Surgical History:   Procedure Laterality Date    HYSTERECTOMY (CERVIX STATUS UNKNOWN)  1960's    ORTHOPEDIC SURGERY  2013    arm    TOTAL KNEE ARTHROPLASTY Right 2006    TOTAL KNEE ARTHROPLASTY Left 2005       Social History     Tobacco Use    Smoking status: Never    Smokeless tobacco: Never   Vaping Use    Vaping Use: Never used   Substance Use Topics    Alcohol use: No    Drug use: No        Family History   Problem Relation Age of Onset    Lung Disease Neg Hx        Review of Systems   Constitutional:  Positive for chills. Negative for diaphoresis, fatigue and fever. HENT:  Positive for congestion, postnasal drip and rhinorrhea. Negative for ear pain, sinus pressure, sinus pain, sore throat and trouble swallowing. Eyes:  Negative for pain, discharge, redness, itching and visual disturbance. Respiratory:  Positive for cough, chest tightness and wheezing. Negative for shortness of breath. Cardiovascular:  Negative for chest pain and palpitations. Gastrointestinal:  Negative for abdominal pain, diarrhea, nausea and vomiting. Musculoskeletal:  Negative for myalgias. Skin:  Negative for rash. Neurological:  Positive for headaches. Negative for dizziness, syncope, weakness and light-headedness. OBJECTIVE    BP (!) 165/73 (Site: Right Upper Arm, Position: Sitting, Cuff Size: Large Adult)   Pulse 98   Temp (!) 101.8 °F (38.8 °C) (Oral)   Resp 19   Ht 5' 5\" (1.651 m)   Wt 196 lb (88.9 kg)   SpO2 92%   BMI 32.62 kg/m²    No data recorded    Physical Exam  Vitals reviewed. Constitutional:       General: She is not in acute distress. Appearance: Normal appearance. She is ill-appearing. She is not toxic-appearing or diaphoretic. Interventions: She is not intubated.      Comments: Patient needs uses hands and needs to rock several times to get up from chairs but once she is up from the chair she walks without any problems and does not appear to be short of breath with walking or talking. HENT:      Head: Normocephalic. Jaw: No trismus. Right Ear: Hearing, tympanic membrane, ear canal and external ear normal. There is no impacted cerumen. Left Ear: Hearing, tympanic membrane, ear canal and external ear normal. There is no impacted cerumen. Nose: Congestion and rhinorrhea present. Right Nostril: No foreign body, epistaxis or occlusion. Left Nostril: No foreign body, epistaxis or occlusion. Right Turbinates: Not enlarged, swollen or pale. Left Turbinates: Not enlarged, swollen or pale. Right Sinus: No maxillary sinus tenderness or frontal sinus tenderness. Left Sinus: No maxillary sinus tenderness or frontal sinus tenderness. Comments: Reddened turbinates     Mouth/Throat:      Mouth: Mucous membranes are moist.      Pharynx: Oropharynx is clear. Uvula midline. Posterior oropharyngeal erythema present. No pharyngeal swelling, oropharyngeal exudate or uvula swelling. Tonsils: No tonsillar exudate or tonsillar abscesses. 0 on the right. 0 on the left. Comments: Cobblestone and erythemic appearance of the posterior pharynx  Eyes:      General: No scleral icterus. Right eye: No discharge. Left eye: No discharge. Extraocular Movements: Extraocular movements intact. Conjunctiva/sclera: Conjunctivae normal.      Pupils: Pupils are equal, round, and reactive to light. Cardiovascular:      Rate and Rhythm: Normal rate and regular rhythm. Pulses: Normal pulses. Radial pulses are 2+ on the right side and 2+ on the left side. Heart sounds: Normal heart sounds. No murmur heard. No friction rub. No gallop.    Pulmonary:      Effort: Pulmonary effort is normal. No bradypnea, accessory muscle usage, prolonged expiration, respiratory distress or retractions. She is not intubated.      Breath sounds: No stridor, decreased air movement or transmitted upper airway sounds. Rhonchi present. No decreased breath sounds, wheezing or rales.      Comments: Mild rhonchi on auscultation but clears with coughing; Cough sounds wet. Respiratory rate slightly increased at 18-20 per minute.   Chest:      Chest wall: No tenderness.   Musculoskeletal:         General: No swelling. Normal range of motion.      Cervical back: Normal range of motion and neck supple. No rigidity.   Lymphadenopathy:      Cervical: No cervical adenopathy.   Skin:     General: Skin is warm and dry.      Capillary Refill: Capillary refill takes less than 2 seconds.      Coloration: Skin is not jaundiced or pale.      Findings: No erythema or rash.   Neurological:      General: No focal deficit present.      Mental Status: She is alert and oriented to person, place, and time. Mental status is at baseline.      Motor: No weakness.      Coordination: Coordination normal.      Gait: Gait normal.   Psychiatric:         Mood and Affect: Mood normal.         Behavior: Behavior normal.         Thought Content: Thought content normal.         Judgment: Judgment normal.       Results for orders placed or performed in visit on 01/23/23   AMB POC SARS-COV-2 AND INFLUENZA A/B   Result Value Ref Range    SARS-COV-2 RNA, POC Negative     Influenza A Antigen, POC Positive Negative    Influenza B Antigen, POC Negative Negative        Lab Results   Component Value Date    WBC 5.4 11/01/2022    HGB 12.8 11/01/2022    HCT 40.3 11/01/2022    MCV 92.2 11/01/2022     11/01/2022       Lab Results   Component Value Date     11/01/2022    K 4.2 11/01/2022     11/01/2022    CO2 30 11/01/2022    BUN 9 11/01/2022    CREATININE 0.80 11/01/2022    GLUCOSE 108 (H) 11/01/2022    CALCIUM 9.0 11/01/2022    PROT 6.7 11/01/2022    LABALBU 3.1 (L) 11/01/2022    BILITOT 0.5 11/01/2022  ALKPHOS 101 11/01/2022    AST 15 11/01/2022    ALT 21 11/01/2022    LABGLOM >60 11/01/2022    GFRAA 97 11/02/2021    AGRATIO 1.5 11/02/2021    GLOB 3.6 11/01/2022           Lab Results   Component Value Date/Time     11/01/2022 02:21 PM    K 4.2 11/01/2022 02:21 PM     11/01/2022 02:21 PM    CO2 30 11/01/2022 02:21 PM    BUN 9 11/01/2022 02:21 PM    CREATININE 0.80 11/01/2022 02:21 PM    GLUCOSE 108 11/01/2022 02:21 PM    CALCIUM 9.0 11/01/2022 02:21 PM        No results found for: LABA1C    Lab Results   Component Value Date    CHOL 196 11/01/2022    CHOL 204 (H) 08/02/2021    CHOL 192 08/12/2019     Lab Results   Component Value Date    TRIG 113 11/01/2022    TRIG 91 08/02/2021    TRIG 96 08/12/2019     Lab Results   Component Value Date    HDL 58 11/01/2022    HDL 62 08/02/2021    HDL 63 08/12/2019     Lab Results   Component Value Date    LDLCALC 115.4 (H) 11/01/2022    LDLCALC 126 (H) 08/02/2021    LDLCALC 110 (H) 08/12/2019     Lab Results   Component Value Date    LABVLDL 22.6 11/01/2022    LABVLDL 19 08/12/2019    VLDL 16 08/02/2021     Lab Results   Component Value Date    CHOLHDLRATIO 3.4 11/01/2022       No results found for: TSHFT4, TSH, TSHREFLEX, DWB0CFK    ASSESSMENT and Adair Gar was seen today for cough. Diagnoses and all orders for this visit:    Influenza A  -     oseltamivir (TAMIFLU) 75 MG capsule; Take 1 capsule by mouth 2 times daily for 5 days    Acute cough  -     AMB POC SARS-COV-2 AND INFLUENZA A/B    Discussed the risks and benefits of tamiflu, including potential side effects of abdominal pain, nausea, and vomiting. Tamiflu can decrease symptoms by a half to a full day, but has variable research on whether it decreases the risk of pneumonia or hospitalization. Given the patient's risks, will prescribe tamilfu today. Discussed patient's case with patient's NP from [facility], Lia Arriaga.  Per Kamila Barrett, patient is advised to use her Albuterol inhaler or nebulizer at least 4 times a day and take up to 2400 mg Guaifenesin every 24 hours to thin out secretions and make it easier to cough. Patient can follow up with a virtual visit with Lucilla Blizzard if need be. Honey is also a natural alternative to help suppress a cough and has been shown to be just as effective as dextromethorphan (or \"DM\" type cough medicines). Advised patient to have a low threshold for going to the ER for shortness of breath or increased respiratory rate or vomiting with tamiflu. Advised patient to have her son look in on her frequently. Advised patient to go straight home and have her son  her prescription. This NP will follow up with the patient via the phone this afternoon and tomorrow as well. Return to the clinic for persisting/worsening symptoms or new complaints that arise. Discussed signs and symptoms that would warrant immediate evaluation including, but not limited to severe headache, blurred vision, speech disturbance, difficulty with ambulation/gait, numbness, tingling, weakness, syncope, chest pain, or shortness of breath. Side effects and risk vs benefits associated with medications prescribed were discussed with patient who verbalized understanding. [place] verbalized understanding and agreement with above plan of care. Counseled on benefits of having a primary care provider which includes, but is not limited to, continuity of care and having a medical home when concerns arise. Also enforced that onsite clinic policy states that we are not to take the place of a primary care provider. I have reviewed the patient's medication list, past medical, family, social, and surgical history in detail and updated the patient record appropriately.      NAVJOT Contreras - PATSY

## 2023-01-23 NOTE — TELEPHONE ENCOUNTER
Called patient to follow up. Patient does not sound short of breath over the phone and is able to speak in complete sentences without needing to take breaths in between a few words. Patient returned home fine and has her prescriptions for tamiflu. Patient has mucinex 12 hour release medication at home as well. Advised patient to take her medications as prescribed, including the albuterol inhaler or nebulizer at least 4 times a day. Patient verbalized understanding and agreed with the plan of care. Will follow up with the patient tomorrow. Advised patient to follow up with the [facility] (employee wellness center) at [phone] if they need anything else.      NAVJOT Vanegas - NP

## 2023-01-24 ENCOUNTER — TELEPHONE (OUTPATIENT)
Dept: OCCUPATIONAL MEDICINE | Age: 83
End: 2023-01-24

## 2023-01-24 NOTE — TELEPHONE ENCOUNTER
Called patient to follow up on symptoms - patient states she's feeling much better today and doesn't have a fever right now but did take acetaminophen a few hours ago. Patient has been taking it easy and resting but she is getting up every hour to walk around and deep breathe. Patient has also been using her albuterol nebulizer every 4 hours as directed by her nurse practitioner in pulmonology. Patient states that she slept really well last night. Patient has not started her Tamiflu yet because she didn't realize her son brought the medication home. Patient is going to start the tamiflu now. Reminded the patient that if she gets an abdominal pain, diarrhea, or vomiting to stop the medication and call the employee wellness center. Patient's voice sounded slightly hoarse over the phone but she sounded energetic and upbeat and wasn't indicating any signs of shortness of breath over the phone. Patient able to speak in complete sentences without having to stop in between words to breathe. Advised patient to follow up with the [facility] (employee wellness center) at [phone] if they need anything else.      NAVJOT Reyes - NP

## 2023-01-27 ENCOUNTER — TELEPHONE (OUTPATIENT)
Dept: OCCUPATIONAL MEDICINE | Age: 83
End: 2023-01-27

## 2023-01-27 NOTE — TELEPHONE ENCOUNTER
Called to follow up. Chip Gomez reports she is feeling much better, taking Tamiflu and no side effects.  Denies any questions or concerns  F/U in the [facility] prn

## 2023-05-02 ENCOUNTER — OFFICE VISIT (OUTPATIENT)
Dept: FAMILY MEDICINE CLINIC | Facility: CLINIC | Age: 83
End: 2023-05-02
Payer: COMMERCIAL

## 2023-05-02 VITALS
OXYGEN SATURATION: 97 % | HEART RATE: 91 BPM | DIASTOLIC BLOOD PRESSURE: 66 MMHG | BODY MASS INDEX: 32.99 KG/M2 | HEIGHT: 65 IN | WEIGHT: 198 LBS | SYSTOLIC BLOOD PRESSURE: 128 MMHG

## 2023-05-02 DIAGNOSIS — Z78.0 POST-MENOPAUSAL: ICD-10-CM

## 2023-05-02 DIAGNOSIS — Z12.31 SCREENING MAMMOGRAM, ENCOUNTER FOR: ICD-10-CM

## 2023-05-02 DIAGNOSIS — M15.9 PRIMARY OSTEOARTHRITIS INVOLVING MULTIPLE JOINTS: ICD-10-CM

## 2023-05-02 DIAGNOSIS — K21.9 GASTRO-ESOPHAGEAL REFLUX DISEASE WITHOUT ESOPHAGITIS: Primary | ICD-10-CM

## 2023-05-02 PROCEDURE — 99213 OFFICE O/P EST LOW 20 MIN: CPT | Performed by: FAMILY MEDICINE

## 2023-05-02 PROCEDURE — 1123F ACP DISCUSS/DSCN MKR DOCD: CPT | Performed by: FAMILY MEDICINE

## 2023-05-02 SDOH — ECONOMIC STABILITY: HOUSING INSECURITY
IN THE LAST 12 MONTHS, WAS THERE A TIME WHEN YOU DID NOT HAVE A STEADY PLACE TO SLEEP OR SLEPT IN A SHELTER (INCLUDING NOW)?: NO

## 2023-05-02 SDOH — ECONOMIC STABILITY: FOOD INSECURITY: WITHIN THE PAST 12 MONTHS, YOU WORRIED THAT YOUR FOOD WOULD RUN OUT BEFORE YOU GOT MONEY TO BUY MORE.: NEVER TRUE

## 2023-05-02 SDOH — ECONOMIC STABILITY: INCOME INSECURITY: HOW HARD IS IT FOR YOU TO PAY FOR THE VERY BASICS LIKE FOOD, HOUSING, MEDICAL CARE, AND HEATING?: NOT HARD AT ALL

## 2023-05-02 SDOH — ECONOMIC STABILITY: FOOD INSECURITY: WITHIN THE PAST 12 MONTHS, THE FOOD YOU BOUGHT JUST DIDN'T LAST AND YOU DIDN'T HAVE MONEY TO GET MORE.: NEVER TRUE

## 2023-05-02 ASSESSMENT — ENCOUNTER SYMPTOMS
SHORTNESS OF BREATH: 0
WHEEZING: 1

## 2023-05-02 ASSESSMENT — PATIENT HEALTH QUESTIONNAIRE - PHQ9
SUM OF ALL RESPONSES TO PHQ QUESTIONS 1-9: 0
SUM OF ALL RESPONSES TO PHQ QUESTIONS 1-9: 0
SUM OF ALL RESPONSES TO PHQ9 QUESTIONS 1 & 2: 0
SUM OF ALL RESPONSES TO PHQ QUESTIONS 1-9: 0
1. LITTLE INTEREST OR PLEASURE IN DOING THINGS: 0
SUM OF ALL RESPONSES TO PHQ QUESTIONS 1-9: 0
2. FEELING DOWN, DEPRESSED OR HOPELESS: 0

## 2023-05-02 NOTE — PROGRESS NOTES
mammogram, encounter for   Encouraged patient to schedule mammogram if desired.              Paige Sow MD

## 2023-07-24 ENCOUNTER — TELEPHONE (OUTPATIENT)
Dept: PULMONOLOGY | Age: 83
End: 2023-07-24

## 2023-07-24 NOTE — TELEPHONE ENCOUNTER
Patient is having an asthma flare up. She says that she may need something to help her get through it . Ask for someone to reach out .  Says she will be at work till 215pm that # is [phone] after that call her on her mobile

## 2023-07-25 RX ORDER — METHYLPREDNISOLONE 4 MG/1
TABLET ORAL
Qty: 40 TABLET | Refills: 0 | Status: SHIPPED | OUTPATIENT
Start: 2023-07-25

## 2023-07-25 NOTE — TELEPHONE ENCOUNTER
I have sent medrol to her pharmacy. She can begin today after lunch or tomorrow after breakfast.    Recommend that the albuterol via nebulizer route rather than MDI until back to baseline. OTC mucolytic, (mucinex or generic equivalent of guaifenesin), 2400mg in 24 hours in divided doses as needed to thin mucous. She needs to be seen if fails to improve.

## 2023-07-25 NOTE — TELEPHONE ENCOUNTER
LOV 12/6/23 with Yani Asencio NP-- asthma    Allergies: sulfa, azithromycin, codeine    Pt c/o cough with thick clear/white mucus, chest tightness, SOB and wheezing. Thinks she is having an asthma flare up. No fevers. Wants help loosening up her mucus. She feels like she cannot get it out of her chest. Using her Advair, albuterol q4h, and zafirlukast. Willing to try medrol, but does not like prednisone. Using the [employer] [address]. Pt currently at work. States that Triage can LVM on her home phone because the surgery waiting room is very busy today.

## 2023-08-16 ENCOUNTER — HOSPITAL ENCOUNTER (OUTPATIENT)
Age: 83
Setting detail: OBSERVATION
Discharge: HOME | End: 2023-08-18
Attending: EMERGENCY MEDICINE | Admitting: INTERNAL MEDICINE
Payer: COMMERCIAL

## 2023-08-16 ENCOUNTER — APPOINTMENT (OUTPATIENT)
Dept: GENERAL RADIOLOGY | Age: 83
End: 2023-08-16
Payer: COMMERCIAL

## 2023-08-16 ENCOUNTER — OFFICE VISIT (OUTPATIENT)
Dept: PULMONOLOGY | Age: 83
End: 2023-08-16
Payer: COMMERCIAL

## 2023-08-16 ENCOUNTER — TELEPHONE (OUTPATIENT)
Dept: PULMONOLOGY | Age: 83
End: 2023-08-16

## 2023-08-16 VITALS
WEIGHT: 202 LBS | SYSTOLIC BLOOD PRESSURE: 124 MMHG | TEMPERATURE: 97.5 F | HEIGHT: 65 IN | DIASTOLIC BLOOD PRESSURE: 70 MMHG | HEART RATE: 148 BPM | OXYGEN SATURATION: 99 % | BODY MASS INDEX: 33.66 KG/M2

## 2023-08-16 DIAGNOSIS — I48.91 A-FIB (HCC): ICD-10-CM

## 2023-08-16 DIAGNOSIS — J45.40 MODERATE PERSISTENT ASTHMA WITHOUT COMPLICATION: ICD-10-CM

## 2023-08-16 DIAGNOSIS — I48.91 NEW ONSET A-FIB (HCC): Primary | ICD-10-CM

## 2023-08-16 DIAGNOSIS — R06.02 SHORTNESS OF BREATH: Primary | ICD-10-CM

## 2023-08-16 DIAGNOSIS — I48.91 ATRIAL FIBRILLATION, UNSPECIFIED TYPE (HCC): ICD-10-CM

## 2023-08-16 PROBLEM — I48.92 ATRIAL FLUTTER WITH RAPID VENTRICULAR RESPONSE (HCC): Status: ACTIVE | Noted: 2023-08-16

## 2023-08-16 PROBLEM — J45.909 ASTHMA: Status: ACTIVE | Noted: 2023-08-16

## 2023-08-16 LAB
ANION GAP SERPL CALC-SCNC: 4 MMOL/L (ref 2–11)
APTT PPP: 86.9 SEC (ref 24.5–34.2)
BASOPHILS # BLD: 0 K/UL (ref 0–0.2)
BASOPHILS NFR BLD: 1 % (ref 0–2)
BUN SERPL-MCNC: 8 MG/DL (ref 8–23)
CALCIUM SERPL-MCNC: 9 MG/DL (ref 8.3–10.4)
CHLORIDE SERPL-SCNC: 105 MMOL/L (ref 101–110)
CO2 SERPL-SCNC: 28 MMOL/L (ref 21–32)
CREAT SERPL-MCNC: 0.8 MG/DL (ref 0.6–1)
DIFFERENTIAL METHOD BLD: ABNORMAL
EKG DIAGNOSIS: NORMAL
EKG Q-T INTERVAL: 280 MS
EKG QRS DURATION: 84 MS
EKG QTC CALCULATION (BAZETT): 395 MS
EKG R AXIS: -16 DEGREES
EKG T AXIS: 47 DEGREES
EKG VENTRICULAR RATE: 120 BPM
EOSINOPHIL # BLD: 0.1 K/UL (ref 0–0.8)
EOSINOPHIL NFR BLD: 2 % (ref 0.5–7.8)
ERYTHROCYTE [DISTWIDTH] IN BLOOD BY AUTOMATED COUNT: 13.1 % (ref 11.9–14.6)
ERYTHROCYTE [DISTWIDTH] IN BLOOD BY AUTOMATED COUNT: 13.3 % (ref 11.9–14.6)
GLUCOSE SERPL-MCNC: 102 MG/DL (ref 65–100)
HCT VFR BLD AUTO: 38.2 % (ref 35.8–46.3)
HCT VFR BLD AUTO: 39.4 % (ref 35.8–46.3)
HGB BLD-MCNC: 12.5 G/DL (ref 11.7–15.4)
HGB BLD-MCNC: 13 G/DL (ref 11.7–15.4)
IMM GRANULOCYTES # BLD AUTO: 0 K/UL (ref 0–0.5)
IMM GRANULOCYTES NFR BLD AUTO: 0 % (ref 0–5)
INR PPP: 1
LYMPHOCYTES # BLD: 1.4 K/UL (ref 0.5–4.6)
LYMPHOCYTES NFR BLD: 25 % (ref 13–44)
MAGNESIUM SERPL-MCNC: 2.2 MG/DL (ref 1.8–2.4)
MCH RBC QN AUTO: 29.6 PG (ref 26.1–32.9)
MCH RBC QN AUTO: 29.8 PG (ref 26.1–32.9)
MCHC RBC AUTO-ENTMCNC: 32.7 G/DL (ref 31.4–35)
MCHC RBC AUTO-ENTMCNC: 33 G/DL (ref 31.4–35)
MCV RBC AUTO: 90.4 FL (ref 82–102)
MCV RBC AUTO: 90.5 FL (ref 82–102)
MONOCYTES # BLD: 0.7 K/UL (ref 0.1–1.3)
MONOCYTES NFR BLD: 12 % (ref 4–12)
NEUTS SEG # BLD: 3.4 K/UL (ref 1.7–8.2)
NEUTS SEG NFR BLD: 60 % (ref 43–78)
NRBC # BLD: 0 K/UL (ref 0–0.2)
NRBC # BLD: 0 K/UL (ref 0–0.2)
NT PRO BNP: 887 PG/ML
PLATELET # BLD AUTO: 267 K/UL (ref 150–450)
PLATELET # BLD AUTO: 268 K/UL (ref 150–450)
PMV BLD AUTO: 9.2 FL (ref 9.4–12.3)
PMV BLD AUTO: 9.3 FL (ref 9.4–12.3)
POTASSIUM SERPL-SCNC: 4 MMOL/L (ref 3.5–5.1)
PROTHROMBIN TIME: 13.7 SEC (ref 12.6–14.3)
RBC # BLD AUTO: 4.22 M/UL (ref 4.05–5.2)
RBC # BLD AUTO: 4.36 M/UL (ref 4.05–5.2)
SODIUM SERPL-SCNC: 137 MMOL/L (ref 133–143)
TROPONIN I SERPL HS-MCNC: 84.3 PG/ML (ref 0–14)
TSH W FREE THYROID IF ABNORMAL: 1.13 UIU/ML (ref 0.36–3.74)
UFH PPP CHRO-ACNC: 0.36 IU/ML (ref 0.3–0.7)
WBC # BLD AUTO: 5.7 K/UL (ref 4.3–11.1)
WBC # BLD AUTO: 6.3 K/UL (ref 4.3–11.1)

## 2023-08-16 PROCEDURE — 94760 N-INVAS EAR/PLS OXIMETRY 1: CPT

## 2023-08-16 PROCEDURE — G0378 HOSPITAL OBSERVATION PER HR: HCPCS

## 2023-08-16 PROCEDURE — 93005 ELECTROCARDIOGRAM TRACING: CPT | Performed by: EMERGENCY MEDICINE

## 2023-08-16 PROCEDURE — 99214 OFFICE O/P EST MOD 30 MIN: CPT | Performed by: NURSE PRACTITIONER

## 2023-08-16 PROCEDURE — 96375 TX/PRO/DX INJ NEW DRUG ADDON: CPT

## 2023-08-16 PROCEDURE — 6360000002 HC RX W HCPCS: Performed by: NURSE PRACTITIONER

## 2023-08-16 PROCEDURE — 85027 COMPLETE CBC AUTOMATED: CPT

## 2023-08-16 PROCEDURE — 96367 TX/PROPH/DG ADDL SEQ IV INF: CPT

## 2023-08-16 PROCEDURE — 71045 X-RAY EXAM CHEST 1 VIEW: CPT

## 2023-08-16 PROCEDURE — 2580000003 HC RX 258: Performed by: EMERGENCY MEDICINE

## 2023-08-16 PROCEDURE — 80048 BASIC METABOLIC PNL TOTAL CA: CPT

## 2023-08-16 PROCEDURE — 93000 ELECTROCARDIOGRAM COMPLETE: CPT | Performed by: NURSE PRACTITIONER

## 2023-08-16 PROCEDURE — 6370000000 HC RX 637 (ALT 250 FOR IP): Performed by: NURSE PRACTITIONER

## 2023-08-16 PROCEDURE — 85610 PROTHROMBIN TIME: CPT

## 2023-08-16 PROCEDURE — 94640 AIRWAY INHALATION TREATMENT: CPT

## 2023-08-16 PROCEDURE — 96365 THER/PROPH/DIAG IV INF INIT: CPT

## 2023-08-16 PROCEDURE — 6360000002 HC RX W HCPCS: Performed by: INTERNAL MEDICINE

## 2023-08-16 PROCEDURE — 85025 COMPLETE CBC W/AUTO DIFF WBC: CPT

## 2023-08-16 PROCEDURE — 96376 TX/PRO/DX INJ SAME DRUG ADON: CPT

## 2023-08-16 PROCEDURE — 2500000003 HC RX 250 WO HCPCS: Performed by: EMERGENCY MEDICINE

## 2023-08-16 PROCEDURE — 85730 THROMBOPLASTIN TIME PARTIAL: CPT

## 2023-08-16 PROCEDURE — 93010 ELECTROCARDIOGRAM REPORT: CPT | Performed by: INTERNAL MEDICINE

## 2023-08-16 PROCEDURE — 96374 THER/PROPH/DIAG INJ IV PUSH: CPT

## 2023-08-16 PROCEDURE — 99203 OFFICE O/P NEW LOW 30 MIN: CPT | Performed by: INTERNAL MEDICINE

## 2023-08-16 PROCEDURE — 83880 ASSAY OF NATRIURETIC PEPTIDE: CPT

## 2023-08-16 PROCEDURE — 83735 ASSAY OF MAGNESIUM: CPT

## 2023-08-16 PROCEDURE — 1123F ACP DISCUSS/DSCN MKR DOCD: CPT | Performed by: NURSE PRACTITIONER

## 2023-08-16 PROCEDURE — 2580000003 HC RX 258: Performed by: NURSE PRACTITIONER

## 2023-08-16 PROCEDURE — 96366 THER/PROPH/DIAG IV INF ADDON: CPT

## 2023-08-16 PROCEDURE — 84443 ASSAY THYROID STIM HORMONE: CPT

## 2023-08-16 PROCEDURE — 2700000000 HC OXYGEN THERAPY PER DAY

## 2023-08-16 PROCEDURE — 6360000002 HC RX W HCPCS: Performed by: EMERGENCY MEDICINE

## 2023-08-16 PROCEDURE — 36415 COLL VENOUS BLD VENIPUNCTURE: CPT

## 2023-08-16 PROCEDURE — 99285 EMERGENCY DEPT VISIT HI MDM: CPT

## 2023-08-16 PROCEDURE — 84484 ASSAY OF TROPONIN QUANT: CPT

## 2023-08-16 PROCEDURE — 85520 HEPARIN ASSAY: CPT

## 2023-08-16 RX ORDER — SODIUM CHLORIDE 0.9 % (FLUSH) 0.9 %
5-40 SYRINGE (ML) INJECTION PRN
Status: DISCONTINUED | OUTPATIENT
Start: 2023-08-16 | End: 2023-08-18 | Stop reason: HOSPADM

## 2023-08-16 RX ORDER — HEPARIN SODIUM 1000 [USP'U]/ML
2000 INJECTION, SOLUTION INTRAVENOUS; SUBCUTANEOUS PRN
Status: DISCONTINUED | OUTPATIENT
Start: 2023-08-16 | End: 2023-08-17

## 2023-08-16 RX ORDER — ALBUTEROL SULFATE 2.5 MG/3ML
2.5 SOLUTION RESPIRATORY (INHALATION) EVERY 8 HOURS PRN
Status: DISCONTINUED | OUTPATIENT
Start: 2023-08-16 | End: 2023-08-18 | Stop reason: HOSPADM

## 2023-08-16 RX ORDER — LEVALBUTEROL INHALATION SOLUTION 1.25 MG/3ML
1.25 SOLUTION RESPIRATORY (INHALATION) EVERY 8 HOURS PRN
Status: DISCONTINUED | OUTPATIENT
Start: 2023-08-16 | End: 2023-08-16

## 2023-08-16 RX ORDER — HEPARIN SODIUM 1000 [USP'U]/ML
4000 INJECTION, SOLUTION INTRAVENOUS; SUBCUTANEOUS PRN
Status: DISCONTINUED | OUTPATIENT
Start: 2023-08-16 | End: 2023-08-17

## 2023-08-16 RX ORDER — ONDANSETRON 2 MG/ML
4 INJECTION INTRAMUSCULAR; INTRAVENOUS EVERY 6 HOURS PRN
Status: DISCONTINUED | OUTPATIENT
Start: 2023-08-16 | End: 2023-08-18 | Stop reason: HOSPADM

## 2023-08-16 RX ORDER — DILTIAZEM HYDROCHLORIDE 5 MG/ML
10 INJECTION INTRAVENOUS
Status: COMPLETED | OUTPATIENT
Start: 2023-08-16 | End: 2023-08-16

## 2023-08-16 RX ORDER — DIMETHICONE, CAMPHOR (SYNTHETIC), MENTHOL, AND PHENOL 1.1; .5; .625; .5 G/100G; G/100G; G/100G; G/100G
OINTMENT TOPICAL PRN
Status: DISCONTINUED | OUTPATIENT
Start: 2023-08-16 | End: 2023-08-18 | Stop reason: HOSPADM

## 2023-08-16 RX ORDER — MONTELUKAST SODIUM 10 MG/1
10 TABLET ORAL NIGHTLY
Status: DISCONTINUED | OUTPATIENT
Start: 2023-08-16 | End: 2023-08-18 | Stop reason: HOSPADM

## 2023-08-16 RX ORDER — HEPARIN SODIUM 10000 [USP'U]/100ML
5-30 INJECTION, SOLUTION INTRAVENOUS CONTINUOUS
Status: DISCONTINUED | OUTPATIENT
Start: 2023-08-16 | End: 2023-08-17

## 2023-08-16 RX ORDER — FUROSEMIDE 10 MG/ML
20 INJECTION INTRAMUSCULAR; INTRAVENOUS ONCE
Status: COMPLETED | OUTPATIENT
Start: 2023-08-16 | End: 2023-08-16

## 2023-08-16 RX ORDER — ONDANSETRON 4 MG/1
4 TABLET, ORALLY DISINTEGRATING ORAL EVERY 8 HOURS PRN
Status: DISCONTINUED | OUTPATIENT
Start: 2023-08-16 | End: 2023-08-18 | Stop reason: HOSPADM

## 2023-08-16 RX ORDER — POLYETHYLENE GLYCOL 3350 17 G/17G
17 POWDER, FOR SOLUTION ORAL DAILY PRN
Status: DISCONTINUED | OUTPATIENT
Start: 2023-08-16 | End: 2023-08-18 | Stop reason: HOSPADM

## 2023-08-16 RX ORDER — PANTOPRAZOLE SODIUM 40 MG/1
40 TABLET, DELAYED RELEASE ORAL
Status: DISCONTINUED | OUTPATIENT
Start: 2023-08-17 | End: 2023-08-18 | Stop reason: HOSPADM

## 2023-08-16 RX ORDER — SODIUM CHLORIDE 0.9 % (FLUSH) 0.9 %
5-40 SYRINGE (ML) INJECTION EVERY 12 HOURS SCHEDULED
Status: DISCONTINUED | OUTPATIENT
Start: 2023-08-16 | End: 2023-08-18 | Stop reason: HOSPADM

## 2023-08-16 RX ORDER — HEPARIN SODIUM 1000 [USP'U]/ML
4000 INJECTION, SOLUTION INTRAVENOUS; SUBCUTANEOUS ONCE
Status: COMPLETED | OUTPATIENT
Start: 2023-08-16 | End: 2023-08-16

## 2023-08-16 RX ORDER — SODIUM CHLORIDE 9 MG/ML
INJECTION, SOLUTION INTRAVENOUS PRN
Status: DISCONTINUED | OUTPATIENT
Start: 2023-08-16 | End: 2023-08-18 | Stop reason: HOSPADM

## 2023-08-16 RX ORDER — BENZONATATE 100 MG/1
100 CAPSULE ORAL 3 TIMES DAILY PRN
Status: DISCONTINUED | OUTPATIENT
Start: 2023-08-16 | End: 2023-08-18 | Stop reason: HOSPADM

## 2023-08-16 RX ORDER — HYDRALAZINE HYDROCHLORIDE 20 MG/ML
10 INJECTION INTRAMUSCULAR; INTRAVENOUS EVERY 6 HOURS PRN
Status: DISCONTINUED | OUTPATIENT
Start: 2023-08-16 | End: 2023-08-18 | Stop reason: HOSPADM

## 2023-08-16 RX ORDER — ACETAMINOPHEN 325 MG/1
650 TABLET ORAL EVERY 6 HOURS PRN
Status: DISCONTINUED | OUTPATIENT
Start: 2023-08-16 | End: 2023-08-18 | Stop reason: HOSPADM

## 2023-08-16 RX ADMIN — MOMETASONE FUROATE AND FORMOTEROL FUMARATE DIHYDRATE 2 PUFF: 200; 5 AEROSOL RESPIRATORY (INHALATION) at 21:06

## 2023-08-16 RX ADMIN — DILTIAZEM HYDROCHLORIDE 10 MG: 5 INJECTION INTRAVENOUS at 17:11

## 2023-08-16 RX ADMIN — HEPARIN SODIUM 10 UNITS/KG/HR: 10000 INJECTION, SOLUTION INTRAVENOUS at 19:25

## 2023-08-16 RX ADMIN — FUROSEMIDE 20 MG: 10 INJECTION, SOLUTION INTRAMUSCULAR; INTRAVENOUS at 19:44

## 2023-08-16 RX ADMIN — SODIUM CHLORIDE, PRESERVATIVE FREE 5 ML: 5 INJECTION INTRAVENOUS at 20:54

## 2023-08-16 RX ADMIN — HEPARIN SODIUM 4000 UNITS: 1000 INJECTION INTRAVENOUS; SUBCUTANEOUS at 19:24

## 2023-08-16 RX ADMIN — MONTELUKAST 10 MG: 10 TABLET, FILM COATED ORAL at 20:54

## 2023-08-16 RX ADMIN — SODIUM CHLORIDE 5 MG/HR: 900 INJECTION, SOLUTION INTRAVENOUS at 18:49

## 2023-08-16 RX ADMIN — HYDRALAZINE HYDROCHLORIDE 10 MG: 20 INJECTION INTRAMUSCULAR; INTRAVENOUS at 22:12

## 2023-08-16 ASSESSMENT — ENCOUNTER SYMPTOMS
CHEST TIGHTNESS: 0
HEMOPTYSIS: 0
VOMITING: 0
SHORTNESS OF BREATH: 0
SHORTNESS OF BREATH: 1
WHEEZING: 0
SPUTUM PRODUCTION: 1
HEARTBURN: 0
ORTHOPNEA: 0
CHOKING: 0
COUGH: 0
COUGH: 1
WHEEZING: 0
EYES NEGATIVE: 1
NAUSEA: 0

## 2023-08-16 ASSESSMENT — PAIN SCALES - GENERAL
PAINLEVEL_OUTOF10: 0
PAINLEVEL_OUTOF10: 0

## 2023-08-16 ASSESSMENT — LIFESTYLE VARIABLES
HOW MANY STANDARD DRINKS CONTAINING ALCOHOL DO YOU HAVE ON A TYPICAL DAY: PATIENT DOES NOT DRINK
HOW OFTEN DO YOU HAVE A DRINK CONTAINING ALCOHOL: NEVER

## 2023-08-16 ASSESSMENT — PAIN - FUNCTIONAL ASSESSMENT: PAIN_FUNCTIONAL_ASSESSMENT: 0-10

## 2023-08-16 NOTE — ED NOTES
TRANSFER - OUT REPORT:    Verbal report given to [facility] RN on Vida Wong  being transferred to [facility] for routine progression of patient care       Report consisted of patient's Situation, Background, Assessment and   Recommendations(SBAR). Information from the following report(s) ED SBAR was reviewed with the receiving nurse. Lines:   Peripheral IV 08/16/23 Left Antecubital (Active)        Opportunity for questions and clarification was provided.       Patient transported with:  Monsiha Hennessy RN  08/16/23 1950

## 2023-08-16 NOTE — ED PROVIDER NOTES
Emergency Department Provider Note       PCP: Mounika Hicks MD   Age: 82 y.o. Sex: female     DISPOSITION Decision To Admit 08/16/2023 06:33:16 PM       ICD-10-CM    1. New onset a-fib ([address])  I48.91           Medical Decision Making     Complexity of Problems Addressed:  1 or more acute illnesses that pose a threat to life or bodily function. Evaluation for new onset atrial fibrillation with rapid ventricular response. Data Reviewed and Analyzed:  I independently ordered and reviewed each unique test.  I reviewed external records: ED visit note from an outside group. I reviewed external records: previous EKG including cardiologist interpretation. Discussion of management or test interpretation. Patient remained tachycardic after bolus dosing of Cardizem and a Cardizem drip was initiated. Laboratory data shows expected abnormalities is an elevation slightly of the BNP as well as troponin. Chest x-ray shows no evidence of heart failure. Case was discussed with Dr. Anthony Borrego for admission to the hospital.  IV heparin also initiated. The patient was admitted and I have discussed patient management with the admitting provider. Risk of Complications and/or Morbidity of Patient Management:  Drug therapy given requiring intensive monitoring for toxicity. Shared medical decision making was utilized in creating the patients health plan today. History      Bindu Valero is a 82 y.o. female who presents to the Emergency Department with chief complaint of    Chief Complaint   Patient presents with    Shortness of Breath      Patient sent to the emergency ferment from pulmonology office where she was being seen for shortness of breath and discovered to be in atrial fibrillation. The patient has no history of atrial fibrillation, but patient does have a history of asthma. Onset of symptoms or atrial fibrillation is unknown, as the patient was unaware she was tachycardic.   She reports Value Ref Range    WBC 5.7 4.3 - 11.1 K/uL    RBC 4.22 4.05 - 5.2 M/uL    Hemoglobin 12.5 11.7 - 15.4 g/dL    Hematocrit 38.2 35.8 - 46.3 %    MCV 90.5 82 - 102 FL    MCH 29.6 26.1 - 32.9 PG    MCHC 32.7 31.4 - 35.0 g/dL    RDW 13.3 11.9 - 14.6 %    Platelets 658 484 - 492 K/uL    MPV 9.2 (L) 9.4 - 12.3 FL    nRBC 0.00 0.0 - 0.2 K/uL    Differential Type AUTOMATED      Neutrophils % 60 43 - 78 %    Lymphocytes % 25 13 - 44 %    Monocytes % 12 4.0 - 12.0 %    Eosinophils % 2 0.5 - 7.8 %    Basophils % 1 0.0 - 2.0 %    Immature Granulocytes 0 0.0 - 5.0 %    Neutrophils Absolute 3.4 1.7 - 8.2 K/UL    Lymphocytes Absolute 1.4 0.5 - 4.6 K/UL    Monocytes Absolute 0.7 0.1 - 1.3 K/UL    Eosinophils Absolute 0.1 0.0 - 0.8 K/UL    Basophils Absolute 0.0 0.0 - 0.2 K/UL    Absolute Immature Granulocyte 0.0 0.0 - 0.5 K/UL   Troponin   Result Value Ref Range    Troponin, High Sensitivity 84.3 (H) 0 - 14 pg/mL   Magnesium   Result Value Ref Range    Magnesium 2.2 1.8 - 2.4 mg/dL   TSH with Reflex   Result Value Ref Range    TSH w Free Thyroid if Abnormal 1.13 0.358 - 3.740 UIU/ML   Brain Natriuretic Peptide   Result Value Ref Range    NT Pro- (H) <450 PG/ML   EKG 12 Lead   Result Value Ref Range    Ventricular Rate 120 BPM    QRS Duration 84 ms    Q-T Interval 280 ms    QTc Calculation (Bazett) 395 ms    R Axis -16 degrees    T Axis 47 degrees    Diagnosis       Atrial fibrillation with rapid ventricular response  Anterior infarct , age undetermined  Abnormal ECG  When compared with ECG of 26-APR-2021 13:52,  Atrial fibrillation has replaced Sinus rhythm  Vent. rate has increased BY  41 BPM  Anterior infarct is now Present          XR CHEST PORTABLE   Final Result      No acute cardiopulmonary process. Halima Gill M.D.    8/16/2023 5:19:00 PM                        Voice dictation software was used during the making of this note.   This software is not perfect and grammatical and other typographical errors may be

## 2023-08-16 NOTE — ED TRIAGE NOTES
Pt. Presents with increased SOB over the past couple of weeks. Has a history of asthma, but states this is different. Pt. Denies any CP, numbness, or tingling.

## 2023-08-16 NOTE — H&P
[facility] CARDIOLOGY History &Physical                 Primary Cardiologist: None    Primary Care Physician: Jona Dye MD    Admitting Physician: Dr Librado Bejarano:     Patient is a 82 y.o. female with a hx of Obesity, GERD, HH, menopause, Asthma, and arthritis. The patient has recently been treated by [facility] for acute on chronic Asthma exacerbation that was treated with steroids. Recently over the past two weeks she has not been able to keep up at work and feels like her inhalers have not been working. She is complaint with home treatment for her respiratory conditions. Her original cough was productive but has now become more of a none productive cough. At her pulmonologist office an EKG was completed with an irregular rhythm found concerning for A Fib. She has no known hx of arrhythmias in the past.  Review of her Vitals show normal BP on arrival but then HTN up to 765 systolic with HR up to 202 BMP. The patient was noted to have normal electrolytes, mild elevated NT Pro BMP, mild elevated HS Trop, with albumin 3.1. TSH resulted 1.13. She has no complaints of CP or palpitations at this time and has never seen a cardiologist in the past.  She also denies ha, loc, ams, falls, taking stimulant medications, or active CP.           Past Medical History:   Diagnosis Date    Arthritis     Asthma exacerbation     hospitalization 2004    Bilateral arm fractures 2013    Cancer [facility])     GERD (gastroesophageal reflux disease)     Hiatal hernia     Menopause     Morbid obesity ([address])       Past Surgical History:   Procedure Laterality Date    HYSTERECTOMY (CERVIX STATUS UNKNOWN)  1960's    ORTHOPEDIC SURGERY  2013    arm    TOTAL KNEE ARTHROPLASTY Right 2006    TOTAL KNEE ARTHROPLASTY Left 2005      Allergies   Allergen Reactions    Azithromycin Other (See Comments)    Codeine Other (See Comments)    Sulfa Antibiotics Other (See Comments)     Social History       Tobacco History       Smoking

## 2023-08-16 NOTE — TELEPHONE ENCOUNTER
Pt states that she is still very SOB, and can't get all together. . She feels like she may need a shot or different medicine. She asked for a visit in the afternoons. Gets off work at 1:30. Please call her at work at [phone] until 1:30 or after that , please call her at [phone].

## 2023-08-17 ENCOUNTER — APPOINTMENT (OUTPATIENT)
Dept: NON INVASIVE DIAGNOSTICS | Age: 83
End: 2023-08-17
Payer: COMMERCIAL

## 2023-08-17 ENCOUNTER — APPOINTMENT (OUTPATIENT)
Dept: CARDIAC CATH/INVASIVE PROCEDURES | Age: 83
End: 2023-08-17
Attending: INTERNAL MEDICINE
Payer: COMMERCIAL

## 2023-08-17 PROBLEM — I48.91 ATRIAL FIBRILLATION WITH RAPID VENTRICULAR RESPONSE (HCC): Status: ACTIVE | Noted: 2023-08-17

## 2023-08-17 LAB
ECHO AO ASC DIAM: 3.6 CM
ECHO AO ASCENDING AORTA INDEX: 1.83 CM/M2
ECHO AO ROOT DIAM: 3.3 CM
ECHO AO ROOT INDEX: 1.68 CM/M2
ECHO AV AREA PEAK VELOCITY: 1.8 CM2
ECHO AV AREA VTI: 1.9 CM2
ECHO AV AREA/BSA PEAK VELOCITY: 0.9 CM2/M2
ECHO AV AREA/BSA VTI: 1 CM2/M2
ECHO AV MEAN GRADIENT: 5 MMHG
ECHO AV MEAN VELOCITY: 1.1 M/S
ECHO AV PEAK GRADIENT: 10 MMHG
ECHO AV PEAK VELOCITY: 1.6 M/S
ECHO AV VELOCITY RATIO: 0.56
ECHO AV VTI: 32.4 CM
ECHO BSA: 2.03 M2
ECHO BSA: 2.03 M2
ECHO EST RA PRESSURE: 3 MMHG
ECHO IVC PROX: 1.7 CM
ECHO LA AREA 2C: 29.1 CM2
ECHO LA AREA 4C: 29.8 CM2
ECHO LA DIAMETER INDEX: 1.47 CM/M2
ECHO LA DIAMETER: 2.9 CM
ECHO LA MAJOR AXIS: 6.5 CM
ECHO LA MINOR AXIS: 6.7 CM
ECHO LA TO AORTIC ROOT RATIO: 0.88
ECHO LA VOL 2C: 101 ML (ref 22–52)
ECHO LA VOL 4C: 108 ML (ref 22–52)
ECHO LA VOL BP: 106 ML (ref 22–52)
ECHO LA VOL/BSA BIPLANE: 54 ML/M2 (ref 16–34)
ECHO LA VOLUME INDEX A2C: 51 ML/M2 (ref 16–34)
ECHO LA VOLUME INDEX A4C: 55 ML/M2 (ref 16–34)
ECHO LV E' LATERAL VELOCITY: 9 CM/S
ECHO LV E' SEPTAL VELOCITY: 8 CM/S
ECHO LV EDV A2C: 98 ML
ECHO LV EDV A4C: 85 ML
ECHO LV EDV INDEX A4C: 43 ML/M2
ECHO LV EDV NDEX A2C: 50 ML/M2
ECHO LV EJECTION FRACTION A2C: 59 %
ECHO LV EJECTION FRACTION A4C: 54 %
ECHO LV EJECTION FRACTION BIPLANE: 57 % (ref 55–100)
ECHO LV ESV A2C: 40 ML
ECHO LV ESV A4C: 39 ML
ECHO LV ESV INDEX A2C: 20 ML/M2
ECHO LV ESV INDEX A4C: 20 ML/M2
ECHO LV FRACTIONAL SHORTENING: 23 % (ref 28–44)
ECHO LV INTERNAL DIMENSION DIASTOLE INDEX: 2.39 CM/M2
ECHO LV INTERNAL DIMENSION DIASTOLIC: 4.7 CM (ref 3.9–5.3)
ECHO LV INTERNAL DIMENSION SYSTOLIC INDEX: 1.83 CM/M2
ECHO LV INTERNAL DIMENSION SYSTOLIC: 3.6 CM
ECHO LV IVSD: 1.1 CM (ref 0.6–0.9)
ECHO LV MASS 2D: 175.8 G (ref 67–162)
ECHO LV MASS INDEX 2D: 89.3 G/M2 (ref 43–95)
ECHO LV POSTERIOR WALL DIASTOLIC: 1 CM (ref 0.6–0.9)
ECHO LV RELATIVE WALL THICKNESS RATIO: 0.43
ECHO LVOT AREA: 3.1 CM2
ECHO LVOT AV VTI INDEX: 0.62
ECHO LVOT DIAM: 2 CM
ECHO LVOT MEAN GRADIENT: 2 MMHG
ECHO LVOT PEAK GRADIENT: 4 MMHG
ECHO LVOT PEAK VELOCITY: 0.9 M/S
ECHO LVOT STROKE VOLUME INDEX: 31.9 ML/M2
ECHO LVOT SV: 62.8 ML
ECHO LVOT VTI: 20 CM
ECHO MV A VELOCITY: 0.58 M/S
ECHO MV AREA VTI: 2 CM2
ECHO MV E DECELERATION TIME (DT): 211 MS
ECHO MV E VELOCITY: 1.23 M/S
ECHO MV E/A RATIO: 2.12
ECHO MV E/E' LATERAL: 13.67
ECHO MV E/E' RATIO (AVERAGED): 14.52
ECHO MV E/E' SEPTAL: 15.38
ECHO MV EROA PISA: 0.1 CM2
ECHO MV LVOT VTI INDEX: 1.55
ECHO MV MAX VELOCITY: 1.3 M/S
ECHO MV MEAN GRADIENT: 2 MMHG
ECHO MV MEAN VELOCITY: 0.7 M/S
ECHO MV PEAK GRADIENT: 6 MMHG
ECHO MV REGURGITANT ALIASING (NYQUIST) VELOCITY: 35 CM/S
ECHO MV REGURGITANT PEAK GRADIENT: 121 MMHG
ECHO MV REGURGITANT PEAK VELOCITY: 5.5 M/S
ECHO MV REGURGITANT RADIUS PISA: 0.6 CM
ECHO MV REGURGITANT VOLUME PISA: 25.18 ML
ECHO MV REGURGITANT VTIA: 175 CM
ECHO MV VTI: 30.9 CM
ECHO PV ACCELERATION TIME (AT): 82 MS
ECHO PV MAX VELOCITY: 0.9 M/S
ECHO PV PEAK GRADIENT: 3 MMHG
ECHO RIGHT VENTRICULAR SYSTOLIC PRESSURE (RVSP): 30 MMHG
ECHO RV BASAL DIMENSION: 4.2 CM
ECHO RV FREE WALL PEAK S': 10 CM/S
ECHO RV INTERNAL DIMENSION: 2.9 CM
ECHO RV TAPSE: 2.3 CM (ref 1.7–?)
ECHO TV REGURGITANT MAX VELOCITY: 2.62 M/S
ECHO TV REGURGITANT PEAK GRADIENT: 27 MMHG
EKG ATRIAL RATE: 76 BPM
EKG DIAGNOSIS: NORMAL
EKG P AXIS: 72 DEGREES
EKG P-R INTERVAL: 200 MS
EKG Q-T INTERVAL: 414 MS
EKG QRS DURATION: 86 MS
EKG QTC CALCULATION (BAZETT): 465 MS
EKG R AXIS: -17 DEGREES
EKG T AXIS: 55 DEGREES
EKG VENTRICULAR RATE: 76 BPM
UFH PPP CHRO-ACNC: 0.2 IU/ML (ref 0.3–0.7)
UFH PPP CHRO-ACNC: 0.39 IU/ML (ref 0.3–0.7)
UFH PPP CHRO-ACNC: 0.47 IU/ML (ref 0.3–0.7)

## 2023-08-17 PROCEDURE — 99152 MOD SED SAME PHYS/QHP 5/>YRS: CPT | Performed by: INTERNAL MEDICINE

## 2023-08-17 PROCEDURE — 93312 ECHO TRANSESOPHAGEAL: CPT | Performed by: INTERNAL MEDICINE

## 2023-08-17 PROCEDURE — 6360000002 HC RX W HCPCS: Performed by: INTERNAL MEDICINE

## 2023-08-17 PROCEDURE — 94760 N-INVAS EAR/PLS OXIMETRY 1: CPT

## 2023-08-17 PROCEDURE — 6360000002 HC RX W HCPCS: Performed by: NURSE PRACTITIONER

## 2023-08-17 PROCEDURE — 6370000000 HC RX 637 (ALT 250 FOR IP): Performed by: NURSE PRACTITIONER

## 2023-08-17 PROCEDURE — 2580000003 HC RX 258: Performed by: INTERNAL MEDICINE

## 2023-08-17 PROCEDURE — 85520 HEPARIN ASSAY: CPT

## 2023-08-17 PROCEDURE — 93320 DOPPLER ECHO COMPLETE: CPT | Performed by: INTERNAL MEDICINE

## 2023-08-17 PROCEDURE — 92960 CARDIOVERSION ELECTRIC EXT: CPT | Performed by: INTERNAL MEDICINE

## 2023-08-17 PROCEDURE — 2580000003 HC RX 258: Performed by: NURSE PRACTITIONER

## 2023-08-17 PROCEDURE — 96376 TX/PRO/DX INJ SAME DRUG ADON: CPT

## 2023-08-17 PROCEDURE — 99213 OFFICE O/P EST LOW 20 MIN: CPT | Performed by: INTERNAL MEDICINE

## 2023-08-17 PROCEDURE — G0378 HOSPITAL OBSERVATION PER HR: HCPCS

## 2023-08-17 PROCEDURE — 6370000000 HC RX 637 (ALT 250 FOR IP): Performed by: INTERNAL MEDICINE

## 2023-08-17 PROCEDURE — 93306 TTE W/DOPPLER COMPLETE: CPT

## 2023-08-17 PROCEDURE — 93010 ELECTROCARDIOGRAM REPORT: CPT | Performed by: INTERNAL MEDICINE

## 2023-08-17 PROCEDURE — 36415 COLL VENOUS BLD VENIPUNCTURE: CPT

## 2023-08-17 PROCEDURE — 93312 ECHO TRANSESOPHAGEAL: CPT

## 2023-08-17 PROCEDURE — 96366 THER/PROPH/DIAG IV INF ADDON: CPT

## 2023-08-17 PROCEDURE — 96375 TX/PRO/DX INJ NEW DRUG ADDON: CPT

## 2023-08-17 PROCEDURE — 93005 ELECTROCARDIOGRAM TRACING: CPT | Performed by: INTERNAL MEDICINE

## 2023-08-17 PROCEDURE — 93325 DOPPLER ECHO COLOR FLOW MAPG: CPT | Performed by: INTERNAL MEDICINE

## 2023-08-17 PROCEDURE — 94761 N-INVAS EAR/PLS OXIMETRY MLT: CPT

## 2023-08-17 PROCEDURE — 94640 AIRWAY INHALATION TREATMENT: CPT

## 2023-08-17 PROCEDURE — 96368 THER/DIAG CONCURRENT INF: CPT

## 2023-08-17 RX ORDER — MIDAZOLAM HYDROCHLORIDE 1 MG/ML
INJECTION INTRAMUSCULAR; INTRAVENOUS PRN
Status: COMPLETED | OUTPATIENT
Start: 2023-08-17 | End: 2023-08-17

## 2023-08-17 RX ORDER — DILTIAZEM HYDROCHLORIDE 60 MG/1
60 TABLET, FILM COATED ORAL EVERY 6 HOURS SCHEDULED
Status: DISCONTINUED | OUTPATIENT
Start: 2023-08-17 | End: 2023-08-18

## 2023-08-17 RX ORDER — LIDOCAINE HYDROCHLORIDE 20 MG/ML
SOLUTION OROPHARYNGEAL PRN
Status: COMPLETED | OUTPATIENT
Start: 2023-08-17 | End: 2023-08-17

## 2023-08-17 RX ORDER — FENTANYL CITRATE 50 UG/ML
INJECTION, SOLUTION INTRAMUSCULAR; INTRAVENOUS PRN
Status: COMPLETED | OUTPATIENT
Start: 2023-08-17 | End: 2023-08-17

## 2023-08-17 RX ADMIN — MOMETASONE FUROATE AND FORMOTEROL FUMARATE DIHYDRATE 2 PUFF: 200; 5 AEROSOL RESPIRATORY (INHALATION) at 21:08

## 2023-08-17 RX ADMIN — SODIUM CHLORIDE, PRESERVATIVE FREE 10 ML: 5 INJECTION INTRAVENOUS at 07:24

## 2023-08-17 RX ADMIN — MIDAZOLAM 2 MG: 1 INJECTION INTRAMUSCULAR; INTRAVENOUS at 10:17

## 2023-08-17 RX ADMIN — PANTOPRAZOLE SODIUM 40 MG: 40 TABLET, DELAYED RELEASE ORAL at 06:23

## 2023-08-17 RX ADMIN — FENTANYL CITRATE 25 MCG: 50 INJECTION, SOLUTION INTRAMUSCULAR; INTRAVENOUS at 10:15

## 2023-08-17 RX ADMIN — FENTANYL CITRATE 25 MCG: 50 INJECTION, SOLUTION INTRAMUSCULAR; INTRAVENOUS at 10:23

## 2023-08-17 RX ADMIN — HEPARIN SODIUM 2000 UNITS: 1000 INJECTION INTRAVENOUS; SUBCUTANEOUS at 02:34

## 2023-08-17 RX ADMIN — SODIUM CHLORIDE, PRESERVATIVE FREE 10 ML: 5 INJECTION INTRAVENOUS at 20:58

## 2023-08-17 RX ADMIN — MIDAZOLAM 2 MG: 1 INJECTION INTRAMUSCULAR; INTRAVENOUS at 10:13

## 2023-08-17 RX ADMIN — APIXABAN 5 MG: 5 TABLET, FILM COATED ORAL at 17:07

## 2023-08-17 RX ADMIN — MOMETASONE FUROATE AND FORMOTEROL FUMARATE DIHYDRATE 2 PUFF: 200; 5 AEROSOL RESPIRATORY (INHALATION) at 07:22

## 2023-08-17 RX ADMIN — HYDRALAZINE HYDROCHLORIDE 10 MG: 20 INJECTION INTRAMUSCULAR; INTRAVENOUS at 14:13

## 2023-08-17 RX ADMIN — AMIODARONE HYDROCHLORIDE 1 MG/MIN: 50 INJECTION, SOLUTION INTRAVENOUS at 16:05

## 2023-08-17 RX ADMIN — MIDAZOLAM 2 MG: 1 INJECTION INTRAMUSCULAR; INTRAVENOUS at 10:15

## 2023-08-17 RX ADMIN — FENTANYL CITRATE 25 MCG: 50 INJECTION, SOLUTION INTRAMUSCULAR; INTRAVENOUS at 10:13

## 2023-08-17 RX ADMIN — MIDAZOLAM 1 MG: 1 INJECTION INTRAMUSCULAR; INTRAVENOUS at 10:23

## 2023-08-17 RX ADMIN — FENTANYL CITRATE 25 MCG: 50 INJECTION, SOLUTION INTRAMUSCULAR; INTRAVENOUS at 10:17

## 2023-08-17 RX ADMIN — DILTIAZEM HYDROCHLORIDE 60 MG: 60 TABLET, FILM COATED ORAL at 17:08

## 2023-08-17 RX ADMIN — AMIODARONE HYDROCHLORIDE 150 MG: 50 INJECTION, SOLUTION INTRAVENOUS at 10:25

## 2023-08-17 RX ADMIN — LIDOCAINE HYDROCHLORIDE 15 ML: 20 SOLUTION ORAL at 10:13

## 2023-08-17 RX ADMIN — MONTELUKAST 10 MG: 10 TABLET, FILM COATED ORAL at 20:51

## 2023-08-17 ASSESSMENT — PAIN SCALES - GENERAL
PAINLEVEL_OUTOF10: 0

## 2023-08-17 NOTE — PROGRESS NOTES
SONIA/cardioversion with Dr. Didi Gtz    Sedation start @ 1013  Sedation stop @ 1029    Viscous lidocaine @ 1013  Versed 7mg IV  Fentanyl 100mcg IV  Amio 150mg IV bolus    Arrived, Afib, rate in the 90s  Cardioverted x1  Now, sinus rhythm, rate in the 70s

## 2023-08-17 NOTE — PROGRESS NOTES
Bedside and Verbal shift change report given to self (oncoming nurse) by Kevin Saavedra RN (offgoing nurse). Report included the following information Nurse Handoff Report, Index, Intake/Output, MAR, Recent Results, and Cardiac Rhythm Afib . Heparin and cardizem gtts infusing at bedside.

## 2023-08-17 NOTE — PROGRESS NOTES
TRANSFER - OUT REPORT:    Verbal report given to MIGUEL Morelos on Michelet Ac  being transferred to [facility] for routine post-op       Report consisted of patient's Situation, Background, Assessment and   Recommendations(SBAR). Information from the following report(s) Surgery Report, Intake/Output, MAR, Med Rec Status, and Cardiac Rhythm NSR  was reviewed with the receiving nurse. Lines:   Peripheral IV 08/16/23 Left Antecubital (Active)   Site Assessment Clean, dry & intact 08/17/23 0733   Line Status Infusing 08/17/23 0733   Line Care Connections checked and tightened 08/17/23 0340   Phlebitis Assessment No symptoms 08/17/23 0733   Infiltration Assessment 0 08/17/23 0733   Alcohol Cap Used No 08/17/23 0340   Dressing Status Clean, dry & intact 08/17/23 0733   Dressing Type Transparent 08/17/23 0733       Peripheral IV Left; Anterior; Lateral Antecubital (Active)   Site Assessment Clean, dry & intact 08/17/23 0733   Line Status Infusing 08/17/23 0733   Line Care Connections checked and tightened 08/17/23 0340   Phlebitis Assessment No symptoms 08/17/23 0733   Infiltration Assessment 0 08/17/23 0733   Alcohol Cap Used Yes 08/17/23 0340   Dressing Status Clean, dry & intact 08/17/23 0733   Dressing Type Transparent 08/17/23 0733   Dressing Intervention New 08/17/23 0340        Opportunity for questions and clarification was provided.       Patient transported with:

## 2023-08-17 NOTE — CARE COORDINATION
Pt was sent to the ED from her pulmonologist's office where she was being seen for SOB and was found to be in A. Fib RVR. Has a PMHx of obesity, GERD, HH, menopause, asthma, and arthritis. She has recently been treated by [facility] for acute on chronic asthma exacerbation that was treated with steroids and is complaint with her home tx for her respiratory conditions. Had SONIA/CVN today, tolerated procedure well. PTA, pt lives alone in a single-story private residence. Indep at baseline. PCP established. [employer] verified and able to afford home meds. No anticipated discharge needs identified, will remain available. 08/17/23 1109   Service Assessment   Patient Orientation Alert and Oriented   Cognition Alert   History Provided By Patient   Primary Caregiver Self   Support Systems Children;Family Members;[employer] witness[employer] [employer];Friends/Neighbors   PCP Verified by CM Yes  Kyleigh Becker)   Prior Functional Level Independent in ADLs/IADLs   Current Functional Level Independent in ADLs/IADLs   Can patient return to prior living arrangement Yes   Ability to make needs known: Good   Family able to assist with home care needs: Yes   Would you like for me to discuss the discharge plan with any other family members/significant others, and if so, who? No   Financial Resources Other (Comment)  ([employer])   Community Resources None   Social/Functional History   Lives With Alone   Type of [address] [place] [address] Center  One level   [facility] None   Receives Help From Family   ADL Assistance Independent   Ambulation Assistance Independent   Active  Yes   Mode of Transportation Car   Occupation Full time employment   Discharge [facility] Medications No   [address] Discharge   Transition of [address] [place] [address]  ([address]) Discharge [facility] Discharge None   [employer] Information Provided?  No   Mode of Stable.

## 2023-08-18 ENCOUNTER — TELEPHONE (OUTPATIENT)
Dept: FAMILY MEDICINE CLINIC | Facility: CLINIC | Age: 83
End: 2023-08-18

## 2023-08-18 VITALS
HEIGHT: 65 IN | DIASTOLIC BLOOD PRESSURE: 57 MMHG | WEIGHT: 195.5 LBS | OXYGEN SATURATION: 96 % | RESPIRATION RATE: 19 BRPM | BODY MASS INDEX: 32.57 KG/M2 | SYSTOLIC BLOOD PRESSURE: 132 MMHG | HEART RATE: 72 BPM | TEMPERATURE: 97.9 F

## 2023-08-18 PROBLEM — I48.92 ATRIAL FLUTTER WITH RAPID VENTRICULAR RESPONSE (HCC): Status: RESOLVED | Noted: 2023-08-16 | Resolved: 2023-08-18

## 2023-08-18 PROBLEM — I48.91 ATRIAL FIBRILLATION WITH RAPID VENTRICULAR RESPONSE (HCC): Status: RESOLVED | Noted: 2023-08-17 | Resolved: 2023-08-18

## 2023-08-18 LAB
ERYTHROCYTE [DISTWIDTH] IN BLOOD BY AUTOMATED COUNT: 13.3 % (ref 11.9–14.6)
HCT VFR BLD AUTO: 37.4 % (ref 35.8–46.3)
HGB BLD-MCNC: 12.3 G/DL (ref 11.7–15.4)
MCH RBC QN AUTO: 29.7 PG (ref 26.1–32.9)
MCHC RBC AUTO-ENTMCNC: 32.9 G/DL (ref 31.4–35)
MCV RBC AUTO: 90.3 FL (ref 82–102)
NRBC # BLD: 0 K/UL (ref 0–0.2)
PLATELET # BLD AUTO: 277 K/UL (ref 150–450)
PMV BLD AUTO: 9.4 FL (ref 9.4–12.3)
RBC # BLD AUTO: 4.14 M/UL (ref 4.05–5.2)
WBC # BLD AUTO: 5.8 K/UL (ref 4.3–11.1)

## 2023-08-18 PROCEDURE — 2580000003 HC RX 258: Performed by: INTERNAL MEDICINE

## 2023-08-18 PROCEDURE — 85027 COMPLETE CBC AUTOMATED: CPT

## 2023-08-18 PROCEDURE — 6360000002 HC RX W HCPCS: Performed by: INTERNAL MEDICINE

## 2023-08-18 PROCEDURE — 36415 COLL VENOUS BLD VENIPUNCTURE: CPT

## 2023-08-18 PROCEDURE — 6370000000 HC RX 637 (ALT 250 FOR IP): Performed by: PHYSICIAN ASSISTANT

## 2023-08-18 PROCEDURE — G0378 HOSPITAL OBSERVATION PER HR: HCPCS

## 2023-08-18 PROCEDURE — 94760 N-INVAS EAR/PLS OXIMETRY 1: CPT

## 2023-08-18 PROCEDURE — 96366 THER/PROPH/DIAG IV INF ADDON: CPT

## 2023-08-18 PROCEDURE — 6370000000 HC RX 637 (ALT 250 FOR IP): Performed by: INTERNAL MEDICINE

## 2023-08-18 PROCEDURE — 94640 AIRWAY INHALATION TREATMENT: CPT

## 2023-08-18 PROCEDURE — 6370000000 HC RX 637 (ALT 250 FOR IP): Performed by: NURSE PRACTITIONER

## 2023-08-18 PROCEDURE — 2580000003 HC RX 258: Performed by: NURSE PRACTITIONER

## 2023-08-18 PROCEDURE — 99213 OFFICE O/P EST LOW 20 MIN: CPT | Performed by: INTERNAL MEDICINE

## 2023-08-18 RX ORDER — DILTIAZEM HYDROCHLORIDE 240 MG/1
240 CAPSULE, COATED, EXTENDED RELEASE ORAL DAILY
Qty: 30 CAPSULE | Refills: 3 | Status: SHIPPED | OUTPATIENT
Start: 2023-08-19

## 2023-08-18 RX ORDER — DILTIAZEM HYDROCHLORIDE 240 MG/1
240 CAPSULE, COATED, EXTENDED RELEASE ORAL DAILY
Status: DISCONTINUED | OUTPATIENT
Start: 2023-08-18 | End: 2023-08-18 | Stop reason: HOSPADM

## 2023-08-18 RX ADMIN — APIXABAN 5 MG: 5 TABLET, FILM COATED ORAL at 08:27

## 2023-08-18 RX ADMIN — SODIUM CHLORIDE, PRESERVATIVE FREE 10 ML: 5 INJECTION INTRAVENOUS at 08:27

## 2023-08-18 RX ADMIN — DILTIAZEM HYDROCHLORIDE 240 MG: 240 CAPSULE, EXTENDED RELEASE ORAL at 12:19

## 2023-08-18 RX ADMIN — AMIODARONE HYDROCHLORIDE 1 MG/MIN: 50 INJECTION, SOLUTION INTRAVENOUS at 00:02

## 2023-08-18 RX ADMIN — DILTIAZEM HYDROCHLORIDE 60 MG: 60 TABLET, FILM COATED ORAL at 00:02

## 2023-08-18 RX ADMIN — MOMETASONE FUROATE AND FORMOTEROL FUMARATE DIHYDRATE 2 PUFF: 200; 5 AEROSOL RESPIRATORY (INHALATION) at 07:07

## 2023-08-18 RX ADMIN — DILTIAZEM HYDROCHLORIDE 60 MG: 60 TABLET, FILM COATED ORAL at 06:11

## 2023-08-18 RX ADMIN — PANTOPRAZOLE SODIUM 40 MG: 40 TABLET, DELAYED RELEASE ORAL at 06:11

## 2023-08-18 NOTE — CARE COORDINATION
Discharge order is in. Pt is discharging home now in stable condition. No discharge needs identified. Tx goals met.     08/18/23 1133   Services At/After Discharge   Transition of Care Consult (CM Consult) Discharge [facility] Discharge None    Resource Information Provided? No   Mode of Transport at Discharge Other (see comment)  (Family)   Confirm Follow Up Transport Family   Condition of Participation: Discharge Planning   The Patient and/or Patient Representative was provided with a Choice of Provider? Patient   The Patient and/Or Patient Representative agree with the Discharge Plan? Yes   Freedom of Choice list was provided with basic dialogue that supports the patient's individualized plan of care/goals, treatment preferences, and shares the quality data associated with the providers?   Yes

## 2023-08-18 NOTE — TELEPHONE ENCOUNTER
Care Transitions Initial Follow Up Call    Outreach made within 2 business days of discharge: Yes    Patient: Melisa Eckert Patient : 1940   MRN: [id number]  Reason for Admission: There are no discharge diagnoses documented for the most recent discharge. Discharge Date: 23       Spoke with: Patient    Discharge department/facility: [facility]    TCM Interactive Patient Contact:  Was patient able to fill all prescriptions: Yes  Was patient instructed to bring all medications to the follow-up visit: Yes  Is patient taking all medications as directed in the discharge summary?  Yes  Does patient understand their discharge instructions: Yes  Does patient have questions or concerns that need addressed prior to 7-14 day follow up office visit: no    Scheduled appointment with PCP within 7-14 days    Follow Up  Future Appointments   Date Time Provider [address] [place] [address]   2023 11:30 AM Antwon Coronado MD [facility] AMB   2023  2:00 PM Antwon Coronado MD [place], [place]

## 2023-08-18 NOTE — PLAN OF CARE
Problem: Discharge Planning  Goal: Discharge to home or other facility with appropriate resources  Outcome: Completed  Flowsheets (Taken 8/18/2023 0829)  Discharge to home or other facility with appropriate resources: Identify barriers to discharge with patient and caregiver     Problem: Pain  Goal: Verbalizes/displays adequate comfort level or baseline comfort level  Outcome: Completed     Problem: Safety - Adult  Goal: Free from fall injury  Outcome: Completed  Flowsheets (Taken 8/18/2023 0829)  Free From Fall Injury: Instruct family/caregiver on patient safety

## 2023-08-18 NOTE — PROGRESS NOTES
Discharge instructions reviewed with patient. Prescriptions given for eliquis, cardizem and med info sheets provided for all new medications. Opportunity for questions provided. Patient voiced understanding of all discharge instructions.

## 2023-08-21 ENCOUNTER — OFFICE VISIT (OUTPATIENT)
Dept: FAMILY MEDICINE CLINIC | Facility: CLINIC | Age: 83
End: 2023-08-21

## 2023-08-21 VITALS
TEMPERATURE: 97.8 F | HEIGHT: 65 IN | DIASTOLIC BLOOD PRESSURE: 62 MMHG | OXYGEN SATURATION: 94 % | WEIGHT: 197 LBS | HEART RATE: 80 BPM | BODY MASS INDEX: 32.82 KG/M2 | SYSTOLIC BLOOD PRESSURE: 128 MMHG

## 2023-08-21 DIAGNOSIS — D68.69 SECONDARY HYPERCOAGULABLE STATE (HCC): ICD-10-CM

## 2023-08-21 DIAGNOSIS — J45.40 MODERATE PERSISTENT ASTHMA WITHOUT COMPLICATION: ICD-10-CM

## 2023-08-21 DIAGNOSIS — Z09 HOSPITAL DISCHARGE FOLLOW-UP: Primary | ICD-10-CM

## 2023-08-21 DIAGNOSIS — I48.91 NEW ONSET A-FIB (HCC): ICD-10-CM

## 2023-08-21 ASSESSMENT — ENCOUNTER SYMPTOMS
SHORTNESS OF BREATH: 0
CHEST TIGHTNESS: 0
ANAL BLEEDING: 0
BLOOD IN STOOL: 0
WHEEZING: 1

## 2023-08-21 NOTE — PROGRESS NOTES
Post-Discharge Transitional Care  Follow Up      Ariadna Martins   YOB: 1940    Date of Office Visit:  8/21/2023  Date of Hospital Admission: 8/16/23  Date of Hospital Discharge: 8/18/23  Risk of hospital readmission (high >=14%. Medium >=10%) :No data recorded    Care management risk score Rising risk (score 2-5) and Complex Care (Scores >=6): No Risk Score On File     Non face to face  following discharge, date last encounter closed (first attempt may have been earlier): 08/18/2023    Call initiated 2 business days of discharge: Yes    ASSESSMENT/PLAN:   Hospital discharge follow-up  -     DE DISCHARGE MEDS RECONCILED W/ CURRENT OUTPATIENT MED LIST  New onset a-fib ([address])  -admission this month for afib with RVR. Cardioversion in hospital. Taking eliquis and diltiazem. Denies palpitations, presycope or syncope. Shortness of breath has improved to baseline. Denies known bleeding. Counseled on taking anticoagulant. Will schedule cardiology follow up. Able to get medications from pharmacy. No concerns today. Secondary hypercoagulable state ([address])  Taking eliquis as prescribed for afib. No known bleeding. Moderate persistent asthma without complication  Faint end expiratory wheezing on exam.  Patient reports breathing is improving. Continue inhalers. Medical Decision Making: moderate complexity  No follow-ups on file. Subjective:   HPI:  Follow up of Hospital problems/diagnosis(es): afib with RVR    Inpatient course: Discharge summary reviewed- see chart. Interval history/Current status: Doing well since discharge. Taking medications as prescribed. Denies side effects or known bleeding.     Patient Active Problem List   Diagnosis    Gastro-esophageal reflux disease without esophagitis    CHANELLE (obstructive sleep apnea)    S/P total hip arthroplasty    Moderate persistent asthma without complication    Non-seasonal allergic rhinitis    Severe obesity with body mass index

## 2023-08-24 ENCOUNTER — TELEPHONE (OUTPATIENT)
Age: 83
End: 2023-08-24

## 2023-08-24 NOTE — TELEPHONE ENCOUNTER
[facility]AN[facility] Paperwork received from [facility]    Pt was admitted 8/16/23 with Afib with RVR. She had SONIA/Cardioversion on 8/17/23 & converted to NSR. Started on Eliquis & Diltiazem. DC's 8/18/23    I spoke to the pt. She states she is still out of work pending clearance from Cardiologist to return. Her Hospital f/u is scheduled for 9/20/23 with Dr. Isac Motta. Pt states she feels good & strong & is ready to return to work. States she would like to return Monday 8/28/23 if ok to do so. Pt states she works at [employer] in [employer].

## 2023-08-25 NOTE — TELEPHONE ENCOUNTER
Per Dr. Vidhi Singh (Discharging physician), as long as patient is feeling well with no symptoms, she may return to work as patient requested on Monday 8/28/23. [facility] paperwork completed & Faxed to Trey Mal[facility] Source) at [phone]. Faxed confirmation received & copy sent to Medical records for scanning.

## 2023-09-20 ENCOUNTER — OFFICE VISIT (OUTPATIENT)
Age: 83
End: 2023-09-20
Payer: COMMERCIAL

## 2023-09-20 VITALS
DIASTOLIC BLOOD PRESSURE: 80 MMHG | HEART RATE: 86 BPM | WEIGHT: 203.5 LBS | BODY MASS INDEX: 33.86 KG/M2 | SYSTOLIC BLOOD PRESSURE: 130 MMHG

## 2023-09-20 DIAGNOSIS — R60.0 BILATERAL LOWER EXTREMITY EDEMA: Primary | ICD-10-CM

## 2023-09-20 DIAGNOSIS — I48.91 NEW ONSET A-FIB (HCC): ICD-10-CM

## 2023-09-20 PROCEDURE — 99214 OFFICE O/P EST MOD 30 MIN: CPT | Performed by: INTERNAL MEDICINE

## 2023-09-20 PROCEDURE — 1123F ACP DISCUSS/DSCN MKR DOCD: CPT | Performed by: INTERNAL MEDICINE

## 2023-09-20 RX ORDER — FUROSEMIDE 20 MG/1
20 TABLET ORAL DAILY
Qty: 30 TABLET | Refills: 2 | Status: SHIPPED | OUTPATIENT
Start: 2023-09-20

## 2023-09-20 RX ORDER — ACETAMINOPHEN 325 MG/1
325 TABLET ORAL EVERY 6 HOURS PRN
COMMUNITY

## 2023-09-20 ASSESSMENT — ENCOUNTER SYMPTOMS
SHORTNESS OF BREATH: 0
ABDOMINAL PAIN: 0
ORTHOPNEA: 0

## 2023-09-20 NOTE — PROGRESS NOTES
[facility] CARDIOLOGY  [address] [place] [address]  PHONE: [phone]      23    NAME:  Marina Martins  : 1940  MRN: [id number]         SUBJECTIVE:   Mary Kay Serrato is a 82 y.o. female seen for a consultation visit regarding the following:     Chief Complaint   Patient presents with    Follow-Up from Hospital            HPI:  Consultation is requested by Vishal Akhtar MD for evaluation of Follow-Up from [facility]   .    Ms. Martins presents today for follow-up. Patient was recently admitted with new onset rapid atrial fibrillation. She underwent SONIA and cardioversion. Since discharge from hospital, she is done well. She denies chest pain, shortness of breath, orthopnea, PND, palpitations, syncope or lower extremity swelling. She was started on Eliquis and has had no bleeding issues. She did get a amiodarone bolus during her cardioversion but was not discharged on this medication. Her echo showed normal LV systolic function, severely dilated left atrium. Key CAD CHF Meds            furosemide (LASIX) 20 MG tablet (Taking)    Sig - Route: Take 1 tablet by mouth daily - Oral    apixaban (ELIQUIS) 5 MG TABS tablet (Taking)    Sig - Route: Take 1 tablet by mouth 2 times daily - Oral    dilTIAZem (CARDIZEM CD) 240 MG extended release capsule (Taking)    Sig - Route: Take 1 capsule by mouth daily - Oral          Key Antihyperglycemic Medications       Patient is on no antihyperglycemic meds. Past Medical History, Past Surgical History, Family history, Social History, and Medications were all reviewed with the patient today and updated as necessary. Prior to Admission medications    Medication Sig Start Date End Date Taking?  Authorizing Provider   acetaminophen (TYLENOL) 325 MG tablet Take 1 tablet by mouth every 6 hours as needed for Pain   Yes Historical Provider, MD   Multiple Vitamins-Minerals (ICAPS AREDS 2 PO) Take by mouth   Yes

## 2023-11-02 ENCOUNTER — OFFICE VISIT (OUTPATIENT)
Dept: FAMILY MEDICINE CLINIC | Facility: CLINIC | Age: 83
End: 2023-11-02
Payer: COMMERCIAL

## 2023-11-02 VITALS
WEIGHT: 207 LBS | BODY MASS INDEX: 34.49 KG/M2 | DIASTOLIC BLOOD PRESSURE: 86 MMHG | SYSTOLIC BLOOD PRESSURE: 138 MMHG | TEMPERATURE: 97.6 F | HEART RATE: 81 BPM | HEIGHT: 65 IN | OXYGEN SATURATION: 97 %

## 2023-11-02 DIAGNOSIS — I48.91 NEW ONSET A-FIB (HCC): Primary | ICD-10-CM

## 2023-11-02 DIAGNOSIS — I48.91 NEW ONSET A-FIB (HCC): ICD-10-CM

## 2023-11-02 DIAGNOSIS — D68.69 SECONDARY HYPERCOAGULABLE STATE (HCC): ICD-10-CM

## 2023-11-02 DIAGNOSIS — I87.2 VENOUS STASIS DERMATITIS OF BOTH LOWER EXTREMITIES: ICD-10-CM

## 2023-11-02 LAB
ALBUMIN SERPL-MCNC: 3.3 G/DL (ref 3.2–4.6)
ALBUMIN/GLOB SERPL: 1 (ref 0.4–1.6)
ALP SERPL-CCNC: 137 U/L (ref 50–136)
ALT SERPL-CCNC: 13 U/L (ref 12–65)
ANION GAP SERPL CALC-SCNC: 7 MMOL/L (ref 2–11)
AST SERPL-CCNC: 14 U/L (ref 15–37)
BASOPHILS # BLD: 0 K/UL (ref 0–0.2)
BASOPHILS NFR BLD: 1 % (ref 0–2)
BILIRUB SERPL-MCNC: 0.3 MG/DL (ref 0.2–1.1)
BUN SERPL-MCNC: 14 MG/DL (ref 8–23)
CALCIUM SERPL-MCNC: 8.8 MG/DL (ref 8.3–10.4)
CHLORIDE SERPL-SCNC: 107 MMOL/L (ref 101–110)
CO2 SERPL-SCNC: 26 MMOL/L (ref 21–32)
CREAT SERPL-MCNC: 0.7 MG/DL (ref 0.6–1)
DIFFERENTIAL METHOD BLD: ABNORMAL
EOSINOPHIL # BLD: 0.3 K/UL (ref 0–0.8)
EOSINOPHIL NFR BLD: 6 % (ref 0.5–7.8)
ERYTHROCYTE [DISTWIDTH] IN BLOOD BY AUTOMATED COUNT: 14 % (ref 11.9–14.6)
GLOBULIN SER CALC-MCNC: 3.2 G/DL (ref 2.8–4.5)
GLUCOSE SERPL-MCNC: 90 MG/DL (ref 65–100)
HCT VFR BLD AUTO: 36.8 % (ref 35.8–46.3)
HGB BLD-MCNC: 11.9 G/DL (ref 11.7–15.4)
IMM GRANULOCYTES # BLD AUTO: 0 K/UL (ref 0–0.5)
IMM GRANULOCYTES NFR BLD AUTO: 0 % (ref 0–5)
LYMPHOCYTES # BLD: 1.7 K/UL (ref 0.5–4.6)
LYMPHOCYTES NFR BLD: 35 % (ref 13–44)
MAGNESIUM SERPL-MCNC: 2 MG/DL (ref 1.8–2.4)
MCH RBC QN AUTO: 29.8 PG (ref 26.1–32.9)
MCHC RBC AUTO-ENTMCNC: 32.3 G/DL (ref 31.4–35)
MCV RBC AUTO: 92 FL (ref 82–102)
MONOCYTES # BLD: 0.5 K/UL (ref 0.1–1.3)
MONOCYTES NFR BLD: 11 % (ref 4–12)
NEUTS SEG # BLD: 2.3 K/UL (ref 1.7–8.2)
NEUTS SEG NFR BLD: 47 % (ref 43–78)
NRBC # BLD: 0 K/UL (ref 0–0.2)
PLATELET # BLD AUTO: 260 K/UL (ref 150–450)
PMV BLD AUTO: 10.2 FL (ref 9.4–12.3)
POTASSIUM SERPL-SCNC: 4 MMOL/L (ref 3.5–5.1)
PROT SERPL-MCNC: 6.5 G/DL (ref 6.3–8.2)
RBC # BLD AUTO: 4 M/UL (ref 4.05–5.2)
SODIUM SERPL-SCNC: 140 MMOL/L (ref 133–143)
WBC # BLD AUTO: 4.9 K/UL (ref 4.3–11.1)

## 2023-11-02 PROCEDURE — 1123F ACP DISCUSS/DSCN MKR DOCD: CPT | Performed by: FAMILY MEDICINE

## 2023-11-02 PROCEDURE — 99214 OFFICE O/P EST MOD 30 MIN: CPT | Performed by: FAMILY MEDICINE

## 2023-11-02 RX ORDER — TRIAMCINOLONE ACETONIDE 1 MG/ML
LOTION TOPICAL
Qty: 60 ML | Refills: 2 | Status: SHIPPED | OUTPATIENT
Start: 2023-11-02 | End: 2023-11-09

## 2023-11-02 ASSESSMENT — ENCOUNTER SYMPTOMS
ABDOMINAL PAIN: 0
ROS SKIN COMMENTS: ITCHING
SHORTNESS OF BREATH: 0
WHEEZING: 0
CHEST TIGHTNESS: 0

## 2023-11-02 ASSESSMENT — PATIENT HEALTH QUESTIONNAIRE - PHQ9
SUM OF ALL RESPONSES TO PHQ QUESTIONS 1-9: 0
SUM OF ALL RESPONSES TO PHQ QUESTIONS 1-9: 0
1. LITTLE INTEREST OR PLEASURE IN DOING THINGS: 0
SUM OF ALL RESPONSES TO PHQ QUESTIONS 1-9: 0
SUM OF ALL RESPONSES TO PHQ9 QUESTIONS 1 & 2: 0
SUM OF ALL RESPONSES TO PHQ QUESTIONS 1-9: 0
2. FEELING DOWN, DEPRESSED OR HOPELESS: 0

## 2023-11-02 NOTE — PROGRESS NOTES
Hilary Frausto is a 82 y.o. female who presents today for the following:  Chief Complaint   Patient presents with    Follow-up     6 month follow up        Allergies   Allergen Reactions    Azithromycin Hives    Codeine Hives    Sulfa Antibiotics Rash       Current Outpatient Medications   Medication Sig Dispense Refill    acetaminophen (TYLENOL) 325 MG tablet Take 1 tablet by mouth every 6 hours as needed for Pain      Multiple Vitamins-Minerals (ICAPS AREDS 2 PO) Take by mouth      furosemide (LASIX) 20 MG tablet Take 1 tablet by mouth daily 30 tablet 2    apixaban (ELIQUIS) 5 MG TABS tablet Take 1 tablet by mouth 2 times daily 60 tablet 2    dilTIAZem (CARDIZEM CD) 240 MG extended release capsule Take 1 capsule by mouth daily 30 capsule 3    zafirlukast (ACCOLATE) 20 MG tablet Take 1 tablet by mouth 2 times daily TAKE ONE TABLET BY MOUTH TWO TIMES A  tablet 3    albuterol (PROVENTIL) (2.5 MG/3ML) 0.083% nebulizer solution 1 vial via nebulizer qid prn Dx - asthma J45.909. Sandra Walton Patient will call when refills are needed 360 mL 11    albuterol sulfate HFA (PROVENTIL;VENTOLIN;PROAIR) 108 (90 Base) MCG/ACT inhaler 2 puffs 4 times daily if needed for shortness of breath or wheezing 18 g 11    fluticasone-salmeterol (ADVAIR DISKUS) 250-50 MCG/ACT AEPB diskus inhaler 1 inhalation twice daily, rinse mouth after use 1 each 11    esomeprazole (NEXIUM) 20 MG delayed release capsule Take 1 capsule by mouth daily       No current facility-administered medications for this visit.        Past Medical History:   Diagnosis Date    Arthritis     Asthma exacerbation     hospitalization 2004    Bilateral arm fractures 2013    Cancer [facility])     GERD (gastroesophageal reflux disease)     Hiatal hernia     Menopause     Morbid obesity ([address])        Past Surgical History:   Procedure Laterality Date    HYSTERECTOMY (CERVIX STATUS UNKNOWN)  1960's    ORTHOPEDIC SURGERY  2013    arm    TOTAL KNEE ARTHROPLASTY Right 2006    TOTAL KNEE

## 2023-11-16 NOTE — TELEPHONE ENCOUNTER
Requested Prescriptions     Pending Prescriptions Disp Refills    apixaban (ELIQUIS) 5 MG TABS tablet 60 tablet 11     Sig: Take 1 tablet by mouth 2 times daily

## 2023-11-16 NOTE — TELEPHONE ENCOUNTER
MEDICATION REFILL REQUEST      Name of Medication:  Eliquis  Dose:  5 mg  Frequency:  BID  Quantity:  60  Days' supply:  30 with 11 refills      Pharmacy Name/Location:  [facility] [phone]  Pt needs this called in today she is out of meds ,and is asking this to be done asap please  today

## 2023-11-22 RX ORDER — ALBUTEROL SULFATE 90 UG/1
AEROSOL, METERED RESPIRATORY (INHALATION)
Qty: 1 EACH | Refills: 11 | Status: SHIPPED | OUTPATIENT
Start: 2023-11-22

## 2023-11-22 NOTE — TELEPHONE ENCOUNTER
Per Kirsten COTA,pt is at the pharmacy asking if we can send her in albuterol inhaler. She needs this called in today. Last office visit was 8/16/2023 with Luann Joiner.  AIYANA SNIDER MA

## 2023-11-22 NOTE — TELEPHONE ENCOUNTER
I sent in refill. She needs to call for follow up appt, was last seen on day of hospital admission for afib.

## 2023-12-07 ENCOUNTER — TELEPHONE (OUTPATIENT)
Dept: PULMONOLOGY | Age: 83
End: 2023-12-07

## 2023-12-20 ENCOUNTER — OFFICE VISIT (OUTPATIENT)
Age: 83
End: 2023-12-20

## 2023-12-20 VITALS
HEART RATE: 76 BPM | HEIGHT: 65 IN | SYSTOLIC BLOOD PRESSURE: 150 MMHG | BODY MASS INDEX: 34.66 KG/M2 | WEIGHT: 208 LBS | DIASTOLIC BLOOD PRESSURE: 68 MMHG

## 2023-12-20 DIAGNOSIS — I48.91 NEW ONSET A-FIB (HCC): Primary | ICD-10-CM

## 2024-01-05 ASSESSMENT — ENCOUNTER SYMPTOMS
ORTHOPNEA: 0
ABDOMINAL PAIN: 0
SHORTNESS OF BREATH: 0

## 2024-01-05 NOTE — PROGRESS NOTES
issues.  Blood pressure is slightly high today, will monitor closely.  Of instructed her to check her blood pressure at home.  Will see her back in 6 months for regular follow-up, or sooner if new issues arise.        No follow-ups on file.          Thank you for allowing me to participate in this patient's care.  Please call or contact me if there are any questions or concerns regarding the above.      Nash Kline III, MD  01/05/24  1:13 PM

## 2024-01-11 ENCOUNTER — OFFICE VISIT (OUTPATIENT)
Dept: PULMONOLOGY | Age: 84
End: 2024-01-11
Payer: COMMERCIAL

## 2024-01-11 VITALS
DIASTOLIC BLOOD PRESSURE: 78 MMHG | OXYGEN SATURATION: 96 % | SYSTOLIC BLOOD PRESSURE: 146 MMHG | RESPIRATION RATE: 16 BRPM | WEIGHT: 213 LBS | HEIGHT: 65 IN | HEART RATE: 79 BPM | TEMPERATURE: 98.4 F | BODY MASS INDEX: 35.49 KG/M2

## 2024-01-11 DIAGNOSIS — J45.40 MODERATE PERSISTENT ASTHMA WITHOUT COMPLICATION: Primary | ICD-10-CM

## 2024-01-11 DIAGNOSIS — E66.01 SEVERE OBESITY (BMI 35.0-39.9) WITH COMORBIDITY (HCC): ICD-10-CM

## 2024-01-11 DIAGNOSIS — J30.89 NON-SEASONAL ALLERGIC RHINITIS DUE TO OTHER ALLERGIC TRIGGER: ICD-10-CM

## 2024-01-11 DIAGNOSIS — Z71.85 VACCINE COUNSELING: ICD-10-CM

## 2024-01-11 LAB
EXPIRATORY TIME: NORMAL
FEF 25-75% %PRED-PRE: NORMAL
FEF 25-75% PRED: NORMAL
FEF 25-75%-PRE: NORMAL
FEV1 %PRED-PRE: 43 %
FEV1 PRED: NORMAL
FEV1/FVC %PRED-PRE: NORMAL
FEV1/FVC PRED: NORMAL
FEV1/FVC: 71 %
FEV1: 0.86 L
FVC %PRED-PRE: 45 %
FVC PRED: NORMAL
FVC: 1.21 L
PEF %PRED-PRE: NORMAL
PEF PRED: NORMAL
PEF-PRE: NORMAL

## 2024-01-11 PROCEDURE — 99214 OFFICE O/P EST MOD 30 MIN: CPT | Performed by: NURSE PRACTITIONER

## 2024-01-11 PROCEDURE — 94010 BREATHING CAPACITY TEST: CPT | Performed by: INTERNAL MEDICINE

## 2024-01-11 PROCEDURE — 1123F ACP DISCUSS/DSCN MKR DOCD: CPT | Performed by: NURSE PRACTITIONER

## 2024-01-11 RX ORDER — FLUTICASONE PROPIONATE AND SALMETEROL 250; 50 UG/1; UG/1
POWDER RESPIRATORY (INHALATION)
Qty: 3 EACH | Refills: 3 | Status: SHIPPED | OUTPATIENT
Start: 2024-01-11

## 2024-01-11 RX ORDER — ZAFIRLUKAST 20 MG/1
20 TABLET, FILM COATED ORAL 2 TIMES DAILY
Qty: 180 TABLET | Refills: 3 | Status: SHIPPED | OUTPATIENT
Start: 2024-01-11

## 2024-01-11 RX ORDER — ALBUTEROL SULFATE 2.5 MG/3ML
SOLUTION RESPIRATORY (INHALATION)
Qty: 360 ML | Refills: 11 | Status: SHIPPED | OUTPATIENT
Start: 2024-01-11

## 2024-01-11 RX ORDER — ALBUTEROL SULFATE 90 UG/1
AEROSOL, METERED RESPIRATORY (INHALATION)
Qty: 3 EACH | Refills: 3 | Status: SHIPPED | OUTPATIENT
Start: 2024-01-11

## 2024-01-11 ASSESSMENT — PULMONARY FUNCTION TESTS
FVC_PERCENT_PREDICTED_PRE: 45
FVC: 1.21
FEV1: 0.86
FEV1/FVC: 71
FEV1_PERCENT_PREDICTED_PRE: 43

## 2024-01-11 ASSESSMENT — ENCOUNTER SYMPTOMS
WHEEZING: 0
SPUTUM PRODUCTION: 0
SHORTNESS OF BREATH: 0
COUGH: 0
HEARTBURN: 0

## 2024-01-11 NOTE — PATIENT INSTRUCTIONS
Continue Advair 1 inhalation twice daily, rinse mouth after use.    Albuterol inhaler or nebulizer 4 times daily as needed.    Continue Accolate 1 tablet twice daily.    Recommend Prevnar 20 pneumonia vaccine, RSV vaccine and newest COVID booster.  She reports that she had seasonal flu vaccine.

## 2024-01-11 NOTE — PROGRESS NOTES
She is an 83-year-old female with history of moderate persistent asthma, allergic rhinitis, GERD who was seen today for follow up.  Last visit 8/16/2023 was secondary to shortness of breath and noted to have A-fib with rapid ventricular response.  She was admitted, underwent transesophageal echocardiogram with no evidence of LA thrombus and had cardioversion.  She has had cardiology follow-up and has maintained sinus rhythm.  She has experienced some lower extremity edema and was begun on low-dose diuretic.  She was doing well at her recent cardiology follow-up 1/5/2024.     DIAGNOSTICS:       PSG 2005.  CXR 2005, 2006, 2008, 2011.  Spirometry 2005, 2006, 2007, 2010. 2011.  CXR 2/2012, spirometry 2/2012.  CXR 2/22/2023.  Mild scarring noted at the lung bases.  No acute cardiopulmonary disease.  Spirometry 3/18/15 - moderate Restrictive defect, no significant interval change compared with 2012.    Spirometry 9/21/2015 - moderate obstructive defect, slight, but statistically insignificant decline in FVC and FEV1.  Spirometry 3/28/2016 - moderate obstructive defect with decreased FVC, no significant interval change.  Spirometry 7/19/2017-moderate restrictive defect, no significant interval change.  Scooping of expiratory loop consistent with obstructive pattern.  Spirometry 9/20/2019- moderate restrictive defect, no significant interval change.  Scooping of expiratory loop consistent with obstructive pattern.  Spirometry 9/6/2022-moderately severe restrictive defect with obstructive pattern to expiratory loop.  There has been no significant change in FVC, slight decline in FEV1.  Twelve-lead EKG 8/16/2023-A-fib with rapid ventricular rate.  ECHO 8/2023-EF 55-60%, mild-moderate MR. RVSP 30 mmHg.  SONIA/cardioversion 8/2023.  CXR 8/16/2023-lungs are clear, no acute cardiopulmonary process.  
daily    acetaminophen (TYLENOL) 325 MG tablet Take 1 tablet by mouth every 6 hours as needed for Pain    Multiple Vitamins-Minerals (ICAPS AREDS 2 PO) Take by mouth    furosemide (LASIX) 20 MG tablet Take 1 tablet by mouth daily    zafirlukast (ACCOLATE) 20 MG tablet Take 1 tablet by mouth 2 times daily TAKE ONE TABLET BY MOUTH TWO TIMES A DAY    albuterol (PROVENTIL) (2.5 MG/3ML) 0.083% nebulizer solution 1 vial via nebulizer qid prn Dx - asthma J45.909.. Patient will call when refills are needed    fluticasone-salmeterol (ADVAIR DISKUS) 250-50 MCG/ACT AEPB diskus inhaler 1 inhalation twice daily, rinse mouth after use    esomeprazole (NEXIUM) 20 MG delayed release capsule Take 1 capsule by mouth daily    dilTIAZem (CARDIZEM CD) 240 MG extended release capsule Take 1 capsule by mouth daily (Patient not taking: Reported on 1/11/2024)     No current facility-administered medications for this visit.       Over 50% of today's office visit was spent in face to face time reviewing test results, prognosis, importance of compliance, education about disease process, benefits of medications, instructions for management of acute symptoms, and follow up plans.     Electronically signed. Dictated using voice recognition software.  Proof read but unrecognized errors may exist.

## 2024-01-16 NOTE — ED NOTES
I have reviewed discharge instructions with the patient. The patient verbalized understanding. Patient left ED via Discharge Method: ambulatory to Home with self. Opportunity for questions and clarification provided. Patient given 0 scripts. To continue your aftercare when you leave the hospital, you may receive an automated call from our care team to check in on how you are doing. This is a free service and part of our promise to provide the best care and service to meet your aftercare needs.  If you have questions, or wish to unsubscribe from this service please call [phone]. Thank you for Choosing our [facility]. Transferred to P1 at 1215. Report given to receiving RN.

## 2024-05-05 NOTE — PROGRESS NOTES
Dior Martins is a 83 y.o. female who presents today for the following:  Chief Complaint   Patient presents with    Follow-up     Pt presents for FU.        Allergies   Allergen Reactions    Azithromycin Hives    Codeine Hives    Sulfa Antibiotics Rash       Current Outpatient Medications   Medication Sig Dispense Refill    albuterol sulfate HFA (PROVENTIL;VENTOLIN;PROAIR) 108 (90 Base) MCG/ACT inhaler 2 puffs 4 times daily if needed for shortness of breath or wheezing 3 each 3    albuterol (PROVENTIL) (2.5 MG/3ML) 0.083% nebulizer solution 1 vial via nebulizer qid prn Dx - asthma J45.909.. Patient will call when refills are needed 360 mL 11    fluticasone-salmeterol (ADVAIR DISKUS) 250-50 MCG/ACT AEPB diskus inhaler 1 inhalation twice daily, rinse mouth after use 3 each 3    zafirlukast (ACCOLATE) 20 MG tablet Take 1 tablet by mouth 2 times daily TAKE ONE TABLET BY MOUTH TWO TIMES A  tablet 3    Misc Natural Products (GLUCOSAMINE CHOND MSM FORMULA PO) Take by mouth      dilTIAZem (CARDIZEM CD) 240 MG extended release capsule Take 1 capsule by mouth daily 90 capsule 3    apixaban (ELIQUIS) 5 MG TABS tablet Take 1 tablet by mouth 2 times daily 60 tablet 11    acetaminophen (TYLENOL) 325 MG tablet Take 1 tablet by mouth every 6 hours as needed for Pain      Multiple Vitamins-Minerals (ICAPS AREDS 2 PO) Take by mouth      furosemide (LASIX) 20 MG tablet Take 1 tablet by mouth daily 30 tablet 2    esomeprazole (NEXIUM) 20 MG delayed release capsule Take 1 capsule by mouth daily       No current facility-administered medications for this visit.       Past Medical History:   Diagnosis Date    Arthritis     Asthma exacerbation     hospitalization 2004    Bilateral arm fractures 2013    Cancer (HCC)     GERD (gastroesophageal reflux disease)     Hiatal hernia     Menopause     Morbid obesity (HCC)        Past Surgical History:   Procedure Laterality Date    HYSTERECTOMY (CERVIX STATUS UNKNOWN)  1960's

## 2024-05-06 ENCOUNTER — OFFICE VISIT (OUTPATIENT)
Dept: FAMILY MEDICINE CLINIC | Facility: CLINIC | Age: 84
End: 2024-05-06
Payer: COMMERCIAL

## 2024-05-06 VITALS
TEMPERATURE: 97.8 F | HEART RATE: 75 BPM | DIASTOLIC BLOOD PRESSURE: 76 MMHG | SYSTOLIC BLOOD PRESSURE: 130 MMHG | WEIGHT: 215 LBS | OXYGEN SATURATION: 98 % | BODY MASS INDEX: 35.78 KG/M2

## 2024-05-06 DIAGNOSIS — I48.91 NEW ONSET A-FIB (HCC): ICD-10-CM

## 2024-05-06 DIAGNOSIS — J45.40 MODERATE PERSISTENT ASTHMA WITHOUT COMPLICATION: Primary | ICD-10-CM

## 2024-05-06 DIAGNOSIS — D68.69 SECONDARY HYPERCOAGULABLE STATE (HCC): ICD-10-CM

## 2024-05-06 DIAGNOSIS — K21.9 GASTRO-ESOPHAGEAL REFLUX DISEASE WITHOUT ESOPHAGITIS: ICD-10-CM

## 2024-05-06 DIAGNOSIS — M15.9 PRIMARY OSTEOARTHRITIS INVOLVING MULTIPLE JOINTS: ICD-10-CM

## 2024-05-06 DIAGNOSIS — Z23 ENCOUNTER FOR IMMUNIZATION: ICD-10-CM

## 2024-05-06 LAB
ALBUMIN SERPL-MCNC: 3.2 G/DL (ref 3.2–4.6)
ALBUMIN/GLOB SERPL: 1 (ref 1–1.9)
ALP SERPL-CCNC: 132 U/L (ref 35–104)
ALT SERPL-CCNC: 7 U/L (ref 12–65)
ANION GAP SERPL CALC-SCNC: 10 MMOL/L (ref 9–18)
AST SERPL-CCNC: 18 U/L (ref 15–37)
BASOPHILS # BLD: 0 K/UL (ref 0–0.2)
BASOPHILS NFR BLD: 1 % (ref 0–2)
BILIRUB SERPL-MCNC: <0.2 MG/DL (ref 0–1.2)
BUN SERPL-MCNC: 12 MG/DL (ref 8–23)
CALCIUM SERPL-MCNC: 9.1 MG/DL (ref 8.8–10.2)
CHLORIDE SERPL-SCNC: 102 MMOL/L (ref 98–107)
CHOLEST SERPL-MCNC: 194 MG/DL (ref 0–200)
CO2 SERPL-SCNC: 28 MMOL/L (ref 20–28)
CREAT SERPL-MCNC: 0.8 MG/DL (ref 0.6–1.1)
DIFFERENTIAL METHOD BLD: NORMAL
EOSINOPHIL # BLD: 0.4 K/UL (ref 0–0.8)
EOSINOPHIL NFR BLD: 7 % (ref 0.5–7.8)
ERYTHROCYTE [DISTWIDTH] IN BLOOD BY AUTOMATED COUNT: 13.8 % (ref 11.9–14.6)
GLOBULIN SER CALC-MCNC: 3.1 G/DL (ref 2.3–3.5)
GLUCOSE SERPL-MCNC: 99 MG/DL (ref 70–99)
HCT VFR BLD AUTO: 37.6 % (ref 35.8–46.3)
HDLC SERPL-MCNC: 56 MG/DL (ref 40–60)
HDLC SERPL: 3.4 (ref 0–5)
HGB BLD-MCNC: 12 G/DL (ref 11.7–15.4)
IMM GRANULOCYTES # BLD AUTO: 0 K/UL (ref 0–0.5)
IMM GRANULOCYTES NFR BLD AUTO: 0 % (ref 0–5)
LDLC SERPL CALC-MCNC: 108 MG/DL (ref 0–100)
LYMPHOCYTES # BLD: 1.9 K/UL (ref 0.5–4.6)
LYMPHOCYTES NFR BLD: 31 % (ref 13–44)
MCH RBC QN AUTO: 29.3 PG (ref 26.1–32.9)
MCHC RBC AUTO-ENTMCNC: 31.9 G/DL (ref 31.4–35)
MCV RBC AUTO: 91.9 FL (ref 82–102)
MONOCYTES # BLD: 0.6 K/UL (ref 0.1–1.3)
MONOCYTES NFR BLD: 10 % (ref 4–12)
NEUTS SEG # BLD: 3.1 K/UL (ref 1.7–8.2)
NEUTS SEG NFR BLD: 51 % (ref 43–78)
NRBC # BLD: 0 K/UL (ref 0–0.2)
PLATELET # BLD AUTO: 299 K/UL (ref 150–450)
PMV BLD AUTO: 9.6 FL (ref 9.4–12.3)
POTASSIUM SERPL-SCNC: 4.1 MMOL/L (ref 3.5–5.1)
PROT SERPL-MCNC: 6.3 G/DL (ref 6.3–8.2)
RBC # BLD AUTO: 4.09 M/UL (ref 4.05–5.2)
SODIUM SERPL-SCNC: 139 MMOL/L (ref 136–145)
TRIGL SERPL-MCNC: 149 MG/DL (ref 0–150)
VLDLC SERPL CALC-MCNC: 30 MG/DL (ref 6–23)
WBC # BLD AUTO: 6 K/UL (ref 4.3–11.1)

## 2024-05-06 PROCEDURE — 90471 IMMUNIZATION ADMIN: CPT | Performed by: FAMILY MEDICINE

## 2024-05-06 PROCEDURE — 1123F ACP DISCUSS/DSCN MKR DOCD: CPT | Performed by: FAMILY MEDICINE

## 2024-05-06 PROCEDURE — 99214 OFFICE O/P EST MOD 30 MIN: CPT | Performed by: FAMILY MEDICINE

## 2024-05-06 PROCEDURE — 90677 PCV20 VACCINE IM: CPT | Performed by: FAMILY MEDICINE

## 2024-05-06 SDOH — ECONOMIC STABILITY: FOOD INSECURITY: WITHIN THE PAST 12 MONTHS, THE FOOD YOU BOUGHT JUST DIDN'T LAST AND YOU DIDN'T HAVE MONEY TO GET MORE.: NEVER TRUE

## 2024-05-06 SDOH — ECONOMIC STABILITY: FOOD INSECURITY: WITHIN THE PAST 12 MONTHS, YOU WORRIED THAT YOUR FOOD WOULD RUN OUT BEFORE YOU GOT MONEY TO BUY MORE.: NEVER TRUE

## 2024-05-06 SDOH — ECONOMIC STABILITY: INCOME INSECURITY: HOW HARD IS IT FOR YOU TO PAY FOR THE VERY BASICS LIKE FOOD, HOUSING, MEDICAL CARE, AND HEATING?: NOT HARD AT ALL

## 2024-05-06 ASSESSMENT — PATIENT HEALTH QUESTIONNAIRE - PHQ9
SUM OF ALL RESPONSES TO PHQ QUESTIONS 1-9: 0
2. FEELING DOWN, DEPRESSED OR HOPELESS: NOT AT ALL
SUM OF ALL RESPONSES TO PHQ9 QUESTIONS 1 & 2: 0
1. LITTLE INTEREST OR PLEASURE IN DOING THINGS: NOT AT ALL
SUM OF ALL RESPONSES TO PHQ QUESTIONS 1-9: 0

## 2024-05-06 ASSESSMENT — ENCOUNTER SYMPTOMS
WHEEZING: 0
SHORTNESS OF BREATH: 0
ROS SKIN COMMENTS: ITCHING
ABDOMINAL PAIN: 0
CHEST TIGHTNESS: 0

## 2024-05-06 NOTE — ASSESSMENT & PLAN NOTE
-diagnosed 08/16/2023  -Established with cardiology.    -on rate control and anticoagulation  -denies presyncope, syncope and heart racing

## 2024-05-22 ENCOUNTER — OFFICE VISIT (OUTPATIENT)
Dept: ORTHOPEDIC SURGERY | Age: 84
End: 2024-05-22
Payer: COMMERCIAL

## 2024-05-22 DIAGNOSIS — M25.551 HIP PAIN, RIGHT: Primary | ICD-10-CM

## 2024-05-22 DIAGNOSIS — E66.01 MORBID OBESITY (HCC): ICD-10-CM

## 2024-05-22 DIAGNOSIS — M16.12 UNILATERAL PRIMARY OSTEOARTHRITIS, LEFT HIP: ICD-10-CM

## 2024-05-22 DIAGNOSIS — M25.562 LEFT KNEE PAIN, UNSPECIFIED CHRONICITY: ICD-10-CM

## 2024-05-22 DIAGNOSIS — M25.552 HIP PAIN, LEFT: ICD-10-CM

## 2024-05-22 PROCEDURE — 99215 OFFICE O/P EST HI 40 MIN: CPT | Performed by: ORTHOPAEDIC SURGERY

## 2024-05-22 PROCEDURE — 1123F ACP DISCUSS/DSCN MKR DOCD: CPT | Performed by: ORTHOPAEDIC SURGERY

## 2024-05-22 NOTE — PROGRESS NOTES
with the [facility] written home therapy program and protocols.  They are encouraged to begin this in anticipation of surgery.  Additionally we at length discussed the importance of weight management and discuss the importance of an appropriate healthy weight.  They were counseled regarding means to address this as well as directed back to the primary care provider further guidance and weight management.  The patient was also advised that if they do have a tobacco history that smoking cessation is appropriate.  We discussed the importance of discussing appropriate tobacco management with her primary care doctor.      We also discussed the definitive option of total Hip arthroplasty.  I counseled the patient regarding the nature of the procedure the preoperative preparation necessary postop recovery and expected outcome.    I counseled them regarding the risks of surgery including risk of infection, DVT formation, loss of motion, dislocation and stiffness.    This patient has significant factors which may increase their surgical risk which include:, Significant previous cardiovascular history which may increase cardiovascular risk.  This will necessitate preoperative clearance via their cardiologist., and Chronic anticoagulation which will require more complex perioperative management, consultation with their primary provider, and increased risk for wound complications and bleeding issues.    We discussed time return to work , general activity and long-term expectations.    I described the [facility] program for the patient and expected time for hospitalization. This patient, with their level of home support, medical comorbidities, and general ambulatory function expected to have an overnight stay in the hospital prior to discharge.    I would plan a Depuy Actis MILVIA. Conventional Instrumentation  I discussed my implant selection process and rationale with the patient.     Patient would like to consider options, if

## 2024-06-20 ENCOUNTER — OFFICE VISIT (OUTPATIENT)
Age: 84
End: 2024-06-20
Payer: COMMERCIAL

## 2024-06-20 VITALS
DIASTOLIC BLOOD PRESSURE: 80 MMHG | HEART RATE: 72 BPM | HEIGHT: 65 IN | BODY MASS INDEX: 36.32 KG/M2 | SYSTOLIC BLOOD PRESSURE: 148 MMHG | WEIGHT: 218 LBS

## 2024-06-20 DIAGNOSIS — D68.69 SECONDARY HYPERCOAGULABLE STATE (HCC): ICD-10-CM

## 2024-06-20 DIAGNOSIS — R60.0 BILATERAL LOWER EXTREMITY EDEMA: ICD-10-CM

## 2024-06-20 DIAGNOSIS — E66.01 SEVERE OBESITY (BMI 35.0-39.9) WITH COMORBIDITY (HCC): ICD-10-CM

## 2024-06-20 DIAGNOSIS — I48.91 NEW ONSET A-FIB (HCC): Primary | ICD-10-CM

## 2024-06-20 PROCEDURE — 99214 OFFICE O/P EST MOD 30 MIN: CPT | Performed by: INTERNAL MEDICINE

## 2024-06-20 PROCEDURE — 1123F ACP DISCUSS/DSCN MKR DOCD: CPT | Performed by: INTERNAL MEDICINE

## 2024-06-20 RX ORDER — OMEGA-3S/DHA/EPA/FISH OIL/D3 300MG-1000
400 CAPSULE ORAL 2 TIMES DAILY
COMMUNITY

## 2024-06-20 RX ORDER — PHENOL 1.4 %
1 AEROSOL, SPRAY (ML) MUCOUS MEMBRANE 2 TIMES DAILY
COMMUNITY

## 2024-06-20 ASSESSMENT — ENCOUNTER SYMPTOMS
ABDOMINAL PAIN: 0
SHORTNESS OF BREATH: 0

## 2024-06-20 NOTE — PROGRESS NOTES
Cervical back: Neck supple.   Skin:     General: Skin is warm and dry.   Neurological:      Mental Status: She is alert and oriented to person, place, and time.   Psychiatric:         Mood and Affect: Mood normal.         Medical problems and test results were reviewed with the patient today.     DATA REVIEW    LIPID PANEL     Lab Results   Component Value Date    CHOL 194 05/06/2024    CHOL 196 11/01/2022    CHOL 204 (H) 08/02/2021     Lab Results   Component Value Date    TRIG 149 05/06/2024    TRIG 113 11/01/2022    TRIG 91 08/02/2021     Lab Results   Component Value Date    HDL 56 05/06/2024    HDL 58 11/01/2022    HDL 62 08/02/2021     No components found for: \"LDLCHOLESTEROL\", \"LDLCALC\"  Lab Results   Component Value Date    VLDL 30 (H) 05/06/2024    VLDL 22.6 11/01/2022    VLDL 16 08/02/2021     Lab Results   Component Value Date    CHOLHDLRATIO 3.4 05/06/2024    CHOLHDLRATIO 3.4 11/01/2022       BMP  Lab Results   Component Value Date/Time     05/06/2024 02:50 PM    K 4.1 05/06/2024 02:50 PM     05/06/2024 02:50 PM    CO2 28 05/06/2024 02:50 PM    BUN 12 05/06/2024 02:50 PM    CREATININE 0.80 05/06/2024 02:50 PM    GLUCOSE 99 05/06/2024 02:50 PM    CALCIUM 9.1 05/06/2024 02:50 PM                OUTSIDE RECORDS REVIEW    Records from outside providers have been reviewed and summarized as noted in the HPI, past history and data review sections of this note, and reviewed with patient. .       ASSESSMENT and PLAN    Dior was seen today for 6 month follow-up.    Diagnoses and all orders for this visit:    New onset a-fib (HCC)    Bilateral lower extremity edema    Secondary hypercoagulable state (HCC)    Severe obesity (BMI 35.0-39.9) with comorbidity (HCC)          IMPRESSION:    Ms. Martins presents today for follow-up.  She is stable for cardiac standpoint today.  Has no acute cardiac complaints today.  She is tolerating medications well including her Eliquis.  From preop standpoint, she

## 2024-07-05 DIAGNOSIS — M16.12 PRIMARY OSTEOARTHRITIS OF LEFT HIP: ICD-10-CM

## 2024-07-05 DIAGNOSIS — M16.12 UNILATERAL PRIMARY OSTEOARTHRITIS, LEFT HIP: Primary | ICD-10-CM

## 2024-08-27 ENCOUNTER — TELEPHONE (OUTPATIENT)
Dept: SURGERY | Age: 84
End: 2024-08-27

## 2024-08-27 ENCOUNTER — HOSPITAL ENCOUNTER (OUTPATIENT)
Dept: REHABILITATION | Age: 84
Discharge: HOME | End: 2024-08-30
Payer: COMMERCIAL

## 2024-08-27 ENCOUNTER — HOSPITAL ENCOUNTER (OUTPATIENT)
Dept: SURGERY | Age: 84
Discharge: HOME | End: 2024-08-30
Payer: COMMERCIAL

## 2024-08-27 VITALS
HEART RATE: 74 BPM | OXYGEN SATURATION: 97 % | DIASTOLIC BLOOD PRESSURE: 84 MMHG | BODY MASS INDEX: 35.7 KG/M2 | RESPIRATION RATE: 16 BRPM | TEMPERATURE: 99 F | WEIGHT: 214.29 LBS | HEIGHT: 65 IN | SYSTOLIC BLOOD PRESSURE: 140 MMHG

## 2024-08-27 LAB
ALBUMIN SERPL-MCNC: 3 G/DL (ref 3.2–4.6)
ALBUMIN/GLOB SERPL: 0.9 (ref 1–1.9)
ALP SERPL-CCNC: 107 U/L (ref 35–104)
ALT SERPL-CCNC: 8 U/L (ref 12–65)
ANION GAP SERPL CALC-SCNC: 12 MMOL/L (ref 9–18)
AST SERPL-CCNC: 13 U/L (ref 15–37)
BILIRUB SERPL-MCNC: 0.4 MG/DL (ref 0–1.2)
BUN SERPL-MCNC: 9 MG/DL (ref 8–23)
CALCIUM SERPL-MCNC: 8.9 MG/DL (ref 8.8–10.2)
CHLORIDE SERPL-SCNC: 102 MMOL/L (ref 98–107)
CO2 SERPL-SCNC: 26 MMOL/L (ref 20–28)
CREAT SERPL-MCNC: 0.7 MG/DL (ref 0.6–1.1)
EKG DIAGNOSIS: NORMAL
EKG Q-T INTERVAL: 354 MS
EKG QRS DURATION: 90 MS
EKG QTC CALCULATION (BAZETT): 451 MS
EKG R AXIS: -14 DEGREES
EKG T AXIS: 110 DEGREES
EKG VENTRICULAR RATE: 98 BPM
ERYTHROCYTE [DISTWIDTH] IN BLOOD BY AUTOMATED COUNT: 13.5 % (ref 11.9–14.6)
GLOBULIN SER CALC-MCNC: 3.2 G/DL (ref 2.3–3.5)
GLUCOSE SERPL-MCNC: 92 MG/DL (ref 70–99)
HCT VFR BLD AUTO: 39 % (ref 35.8–46.3)
HGB BLD-MCNC: 12.6 G/DL (ref 11.7–15.4)
MCH RBC QN AUTO: 28.8 PG (ref 26.1–32.9)
MCHC RBC AUTO-ENTMCNC: 32.3 G/DL (ref 31.4–35)
MCV RBC AUTO: 89.2 FL (ref 82–102)
MRSA DNA SPEC QL NAA+PROBE: NOT DETECTED
NRBC # BLD: 0 K/UL (ref 0–0.2)
PLATELET # BLD AUTO: 276 K/UL (ref 150–450)
PMV BLD AUTO: 9.4 FL (ref 9.4–12.3)
POTASSIUM SERPL-SCNC: 4.6 MMOL/L (ref 3.5–5.1)
PROT SERPL-MCNC: 6.2 G/DL (ref 6.3–8.2)
RBC # BLD AUTO: 4.37 M/UL (ref 4.05–5.2)
S AUREUS CPE NOSE QL NAA+PROBE: DETECTED
SODIUM SERPL-SCNC: 140 MMOL/L (ref 136–145)
WBC # BLD AUTO: 4.9 K/UL (ref 4.3–11.1)

## 2024-08-27 PROCEDURE — 85027 COMPLETE CBC AUTOMATED: CPT

## 2024-08-27 PROCEDURE — 80053 COMPREHEN METABOLIC PANEL: CPT

## 2024-08-27 PROCEDURE — 97161 PT EVAL LOW COMPLEX 20 MIN: CPT

## 2024-08-27 PROCEDURE — 93010 ELECTROCARDIOGRAM REPORT: CPT | Performed by: INTERNAL MEDICINE

## 2024-08-27 PROCEDURE — 87641 MR-STAPH DNA AMP PROBE: CPT

## 2024-08-27 PROCEDURE — 94664 DEMO&/EVAL PT USE INHALER: CPT

## 2024-08-27 PROCEDURE — 94760 N-INVAS EAR/PLS OXIMETRY 1: CPT

## 2024-08-27 PROCEDURE — 93005 ELECTROCARDIOGRAM TRACING: CPT | Performed by: SURGERY

## 2024-08-27 ASSESSMENT — PROMIS GLOBAL HEALTH SCALE
HOW IS THE PROMIS V1.1 BEING ADMINISTERED?: PAPER
IN THE PAST 7 DAYS, HOW WOULD YOU RATE YOUR FATIGUE ON AVERAGE [ON A SCALE FROM 1 (NONE) TO 5 (VERY SEVERE)]?: MILD
IN GENERAL, HOW WOULD YOU RATE YOUR SATISFACTION WITH YOUR SOCIAL ACTIVITIES AND RELATIONSHIPS [ON A SCALE OF 1 (POOR) TO 5 (EXCELLENT)]?: EXCELLENT
IN GENERAL, PLEASE RATE HOW WELL YOU CARRY OUT YOUR USUAL SOCIAL ACTIVITIES (INCLUDES ACTIVITIES AT HOME, AT WORK, AND IN YOUR COMMUNITY, AND RESPONSIBILITIES AS A PARENT, CHILD, SPOUSE, EMPLOYEE, FRIEND, ETC) [ON A SCALE OF 1 (POOR) TO 5 (EXCELLENT)]?: EXCELLENT
WHO IS THE PERSON COMPLETING THE PROMIS V1.1 SURVEY?: SELF
IN GENERAL, HOW WOULD YOU RATE YOUR MENTAL HEALTH, INCLUDING YOUR MOOD AND YOUR ABILITY TO THINK [ON A SCALE OF 1 (POOR) TO 5 (EXCELLENT)]?: EXCELLENT
SUM OF RESPONSES TO QUESTIONS 3, 6, 7, & 8: 14
SUM OF RESPONSES TO QUESTIONS 2, 4, 5, & 10: 20
TO WHAT EXTENT ARE YOU ABLE TO CARRY OUT YOUR EVERYDAY PHYSICAL ACTIVITIES SUCH AS WALKING, CLIMBING STAIRS, CARRYING GROCERIES, OR MOVING A CHAIR [ON A SCALE OF 1 (NOT AT ALL) TO 5 (COMPLETELY)]?: COMPLETELY
IN GENERAL, WOULD YOU SAY YOUR HEALTH IS...[ON A SCALE OF 1 (POOR) TO 5 (EXCELLENT)]: GOOD
IN GENERAL, WOULD YOU SAY YOUR QUALITY OF LIFE IS...[ON A SCALE OF 1 (POOR) TO 5 (EXCELLENT)]: EXCELLENT
IN THE PAST 7 DAYS, HOW WOULD YOU RATE YOUR PAIN ON AVERAGE [ON A SCALE FROM 0 (NO PAIN) TO 10 (WORST IMAGINABLE PAIN)]?: 1
IN GENERAL, HOW WOULD YOU RATE YOUR PHYSICAL HEALTH [ON A SCALE OF 1 (POOR) TO 5 (EXCELLENT)]?: VERY GOOD
IN THE PAST 7 DAYS, HOW OFTEN HAVE YOU BEEN BOTHERED BY EMOTIONAL PROBLEMS, SUCH AS FEELING ANXIOUS, DEPRESSED, OR IRRITABLE [ON A SCALE FROM 1 (NEVER) TO 5 (ALWAYS)]?: NEVER

## 2024-08-27 ASSESSMENT — HOOS JR
HOOS JR RAW SCORE: 7
SITTING: MILD
GOING UP OR DOWN STAIRS: MILD
LYING IN BED (TURNING OVER, MAINTAINING HIP POSITION): MILD
BENDING TO THE FLOOR TO PICK UP OBJECT: MODERATE
WALKING ON UNEVEN SURFACE: MILD
RISING FROM SITTING: MILD
HOOS JR TOTAL INTERVAL SCORE: 67.516
HOOS JR RAW SCORE: 7

## 2024-08-27 ASSESSMENT — PAIN SCALES - GENERAL
PAINLEVEL_OUTOF10: 0
PAINLEVEL_OUTOF10: 7

## 2024-08-27 ASSESSMENT — PAIN DESCRIPTION - LOCATION: LOCATION: HIP

## 2024-08-27 ASSESSMENT — PAIN DESCRIPTION - ORIENTATION: ORIENTATION: LEFT

## 2024-08-27 ASSESSMENT — PULMONARY FUNCTION TESTS: FEV1 (LITERS): 49

## 2024-08-27 NOTE — PROGRESS NOTES
Latest Reference Range & Units 08/27/24 10:34   Sodium 136 - 145 mmol/L 140   Potassium 3.5 - 5.1 mmol/L 4.6   Chloride 98 - 107 mmol/L 102   CARBON DIOXIDE 20 - 28 mmol/L 26   BUN,BUNPL 8 - 23 MG/DL 9   Creatinine 0.60 - 1.10 MG/DL 0.70   Anion Gap 9 - 18 mmol/L 12   Est, Glom Filt Rate >60 ml/min/1.73m2 87   Glucose 70 - 99 mg/dL 92   Calcium 8.8 - 10.2 MG/DL 8.9   Albumin/Globulin Ratio 1.0 - 1.9   0.9 (L)   Total Protein 6.3 - 8.2 g/dL 6.2 (L)   Albumin 3.2 - 4.6 g/dL 3.0 (L)   Globulin 2.3 - 3.5 g/dL 3.2   Alkaline Phosphatase 35 - 104 U/L 107 (H)   ALT 12 - 65 U/L 8 (L)   AST 15 - 37 U/L 13 (L)   Total Bilirubin 0.0 - 1.2 MG/DL 0.4   WBC 4.3 - 11.1 K/uL 4.9   RBC 4.05 - 5.2 M/uL 4.37   Hemoglobin Quant 11.7 - 15.4 g/dL 12.6   Hematocrit 35.8 - 46.3 % 39.0   MCV 82.0 - 102.0 FL 89.2   MCH 26.1 - 32.9 PG 28.8   MCHC 31.4 - 35.0 g/dL 32.3   MPV 9.4 - 12.3 FL 9.4   RDW 11.9 - 14.6 % 13.5   Platelet Count 150 - 450 K/uL 276   Nucleated Red Blood Cells 0.0 - 0.2 K/uL 0.00   (L): Data is abnormally low  (H): Data is abnormally high

## 2024-08-27 NOTE — TELEPHONE ENCOUNTER
CARDIAC CLEARANCE    Surgical, Procedural, or Medication Clearance    Physician or Practice Requesting Clearance: [facility]  : Marisol Ortiz RN  Contact Phone Number: [phone]  Contact Fax Number: [phone]  Date of Surgery/Procedure: 9/17/24  Type of Surgery or Procedure: left total hip replacement  Type of Anesthesia: SPINAL  Medication to hold: Eliquis  Requested # of days to hold: 72 hours    Will replace with one 81 mg ASA/day while holding      Please do not respond via e-mail to this message.

## 2024-08-27 NOTE — PROGRESS NOTES
(HCC), GERD (gastroesophageal reflux disease), Hiatal hernia, History of echocardiogram, Menopause, Moderate persistent asthma, Morbid obesity (HCC), New onset a-fib (HCC), Non-seasonal allergic rhinitis due to other allergic trigger, and Secondary hypercoagulable state ([facility]).  Ms. Martins  has a past surgical history that includes Hysterectomy (1960's); orthopedic surgery (Right, 2013); Total knee arthroplasty (Right, 2006); Total knee arthroplasty (Left, 2005); Colonoscopy; Endoscopy, colon, diagnostic; and joint replacement (Right).    Allergies:  Azithromycin, Codeine, and Sulfa antibiotics    Current Medications:  Refer to pre-assessment nursing note    Home Set-Up/Prior Level of Function:  Lives With: Son (two sons, daughter in laws and granddaughter)  Home Layout: Two level  Home Access: Stairs to enter with rails  Entrance Stairs - Number of Steps: 3  Entrance Stairs - Rails: Both  Bathroom Shower/Tub: Tub/Shower unit  Bathroom Toilet: Bedside commode  Bathroom Equipment: Shower chair  Home Equipment: Cane, Walker - Rolling    Dominant Side:  Dominant Hand: : Right      Current Functional Status:   Ambulation:  [] Independent  [] Walk Indoors Only  [] Walk Outdoors  [x] Use Assistive Device cane  [] Use Wheelchair Only Dressing:  [x] Independent  Requires Assistance from Someone for:  [] Sock/Shoes  [] Pants  [] Everything   Bathing/Showering:   [x] Independent  [] Requires Assistance from Someone  [] Sponge Bath Only Household Activities:  [] Routine house and yard work  [] Light Housework Only  [] None     Objective:   PAIN:   Pre-Treatment Pain  Pain Assessment: 0-10  Pain Level: 7  Pain Location: Hip  Pain Orientation: Left    Post Treatment: 7    Outcome Measure:   HOOS-JR:  Total Raw Score (0-24 Scale): 7  Hoos, JR. Hip Survey 1. Going up or down stairs 2. Walking on an uneven surface 3. Rising from sitting  4. Bending to the floor/ an object 5. Lying in bed (turning over, maintaining hip  initial evaluation charge encompasses clinical chart review, objective assessment, interpretation of assessment, and skilled monitoring of the patient's response to treatment in order to develop a plan of care.     TREATMENT PLAN:   Effective Dates: 8/27/2024 TO 8/27/2024.    Treatment/Session Assessment:  Patient was instructed in PT- HEP to increase strength and ROM in LEs.  Answered all questions.  Frequency/Duration: Patient to continue to perform home exercise program at least twice per day up until her surgery.    EDUCATION: Education Given To: Patient  Education Provided: Role of Therapy, Plan of Care, Home Exercise Program, Transfer Training  Education Method: Demonstration, Verbal  Education Outcome: Verbalized understanding, Demonstrated understanding    MEDICAL NECESSITY: Ms. Martins is expected to optimize herlower extremity strength and ROM in preparation for joint replacement surgery.    REASON FOR CONTINUED SERVICES: Achieve baseline assesment of musculoskeletal system, functional mobility and home environment., educate in PT HEP in preparation for surgery, educate in hospital plan of care.    COMPLIANCE WITH PROGRAM/EXERCISE: compliant most of the time, Will assess as treatment progresses.    TOTAL TREATMENT DURATION:  Time In: 1120  Time Out: 1150  Minutes: 30    Regarding Dior Martins's therapy, I certify that the treatment plan above will be carried out by a therapist or under their direction.  Thank you for this referral,  JUNAID UMANZOR, PT

## 2024-08-27 NOTE — PROGRESS NOTES
Patient verified name and .    Order for consent not found in EHR and unable to match consent with case posting; patient verified.     Type 3 surgery, joint camp assessment complete.    Labs per surgeon: unknown, no orders ;   Labs per anesthesia protocol: CBC, CMP results within anesthesia guidelines; routed via fax to pcp Dr Golden Hunter.  EKG:completed today per protocol and depicts atrial fibrillation~this is old diagnosis, also states inferior infarct so will have anesthesia review per protocol; ECHO 23; EKG 23; cardiology office note per Dr. Kline 24    MRSA/MSSA swab collected per policy. MD to consult pharmacy to dose Vanc if appropriate.     Hospital approved surgical skin cleanser and instructions to return bottle on DOS given per hospital policy.    Patient provided with handouts including Guide to Surgery, Pain Management, Preventing Surgical Site Infections, and [facility] Brochure.    Patient answered medical/surgical history questions at their best of ability. All prior to admission medications documented in [facility] Care. Original medication prescription bottle  visualized during patient appointment.     Patient instructed to hold all vitamins 3 weeks prior to surgery and NSAIDS 5 days prior to surgery.     Patient teach back successful and patient demonstrates knowledge of instruction.

## 2024-08-27 NOTE — PROGRESS NOTES
Total Joint Surgery Preoperative Chart Review      Patient ID:  Dior Martins  [id number]  83 y.o.  1940  Surgeon: Dr. De Oliveira  Date of Surgery: 9/17/2024  Procedure: Total Left Hip Arthroplasty  Primary Care Physician: Golden Hunter [phone]  Specialty Physician(s):      Subjective:   Dior Martins is a 83 y.o. White (non-) female who presents for preoperative evaluation for Total Left Hip arthroplasty.    This is a preoperative chart review note based on data collected by the nurse at the surgical Pre-Assessment visit.    Past Medical History:   Diagnosis Date    Arthritis     Asthma exacerbation     hospitalization 2004    Bilateral arm fractures 2013    Cancer (HCC)     pt denies    GERD (gastroesophageal reflux disease)     Hiatal hernia     History of echocardiogram 08/17/2023      Left Ventricle: Normal left ventricular systolic function with a visually estimated EF of 55 - 60%. Left ventricle size is normal. Normal wall thickness. Normal wall motion.   Mitral Valve: Mild to moderate regurgitation (RV ~23cc, ERO ~0.13).   Left Atrium: Left atrium is severely dilated.    Menopause     Moderate persistent asthma     Morbid obesity (HCC)     New onset a-fib ([facility]) 08/16/2023    Non-seasonal allergic rhinitis due to other allergic trigger     Secondary hypercoagulable state ([facility])       Past Surgical History:   Procedure Laterality Date    COLONOSCOPY      ENDOSCOPY, COLON, DIAGNOSTIC      HYSTERECTOMY (CERVIX STATUS UNKNOWN)  1960's    JOINT REPLACEMENT Right     ORTHOPEDIC SURGERY Right 2013    arm    TOTAL KNEE ARTHROPLASTY Right 2006    TOTAL KNEE ARTHROPLASTY Left 2005     Family History   Problem Relation Age of Onset    Lung Disease Neg Hx       Social History     Tobacco Use    Smoking status: Former     Current packs/day: 0.00     Average packs/day: 0.2 packs/day for 1 year (0.2 ttl pk-yrs)     Types: Cigarettes     Start date: 1962     Quit date: 1963     Years since  quittin.6     Passive exposure: Never    Smokeless tobacco: Never   Substance Use Topics    Alcohol use: No       Prior to Admission medications    Medication Sig Start Date End Date Taking? Authorizing Provider   vitamin D3 (CHOLECALCIFEROL) 10 MCG (400 UNIT) TABS tablet Take 1 tablet by mouth in the morning and at bedtime    Sydnee Chirinos MD   albuterol sulfate HFA (PROVENTIL;VENTOLIN;PROAIR) 108 (90 Base) MCG/ACT inhaler 2 puffs 4 times daily if needed for shortness of breath or wheezing 24   Sherry Lopez APRN - CNP   albuterol (PROVENTIL) (2.5 MG/3ML) 0.083% nebulizer solution 1 vial via nebulizer qid prn Dx - asthma J45.909.. Patient will call when refills are needed 24   Sherry Lopez APRN - CNP   fluticasone-salmeterol (ADVAIR DISKUS) 250-50 MCG/ACT AEPB diskus inhaler 1 inhalation twice daily, rinse mouth after use  Patient taking differently: Inhale 1 puff into the lungs 2 times daily 1 inhalation twice daily, rinse mouth after use 24   Sherry Lopez APRN - CNP   zafirlukast (ACCOLATE) 20 MG tablet Take 1 tablet by mouth 2 times daily TAKE ONE TABLET BY MOUTH TWO TIMES A DAY 24   Sherry Lopez APRN - CNP   dilTIAZem (CARDIZEM CD) 240 MG extended release capsule Take 1 capsule by mouth daily 23   Nash Kline III, MD   apixaban (ELIQUIS) 5 MG TABS tablet Take 1 tablet by mouth 2 times daily 23   Nash Kline III, MD   acetaminophen (TYLENOL) 325 MG tablet Take 1 tablet by mouth every 6 hours as needed for Pain    ProviderSydnee MD   Multiple Vitamins-Minerals (ICAPS AREDS 2 PO) Take by mouth    Sydnee Chirinos MD   furosemide (LASIX) 20 MG tablet Take 1 tablet by mouth daily  Patient taking differently: Take 1 tablet by mouth as needed 23   Nash Kline III, MD   esomeprazole (NEXIUM) 20 MG delayed release capsule Take 1 capsule by mouth daily    Automatic Reconciliation, Ar     Allergies   Allergen

## 2024-08-27 NOTE — PROGRESS NOTES
08/27/24 1230   Treatment   Treatment Type Bedside spirometry   Oxygen Therapy/Pulse Ox   O2 Therapy Room air   Pulse 74   SpO2 97 %   Pulse Oximeter Device Mode Intermittent   $Pulse Oximeter $Spot check (single)   Bedside Spirometry   FEV-1/Actual (Liters) 49 Liters   RT Misc Charges   $RT Education $HHN/MDI/IPPB Demo or Eval  (spacer)     Pt's symptoms include:    Snoring  Tiredness- excessive daytime sleepiness  Previous diagnosis of sleep apnea   GERD  Neck size        41      cm  Modified Cardona stage 4  SACS Score 8  STOP BANG 5  Height     5'5 '    \"   Weight   214  lbs  BMI 35.7      Sleepiness Scale:     Sitting and reading 1    Watching TV 1    Sitting inactive in a public place 0    As a passenger in a car for an hour without a break 1    Lying down to rest in the afternoon when circumstances Permits 1    Sitting and talking to someone 1    Sitting quietly after lunch without alcohol 1    In a car, while stopped for a few minutes 1    Total :  7  Initial respiratory Assessment completed with pt. Pt was interviewed and evaluated in Joint camp prior to surgery.  Patient ID:  Dior Martins  [id number]  83 y.o.  1940  Surgeon: Dr. De Oliveira  Date of Surgery: [unfilled] 09/17/24  Procedure: Total Left Hip Arthroplasty  Primary Care Physician: Golden Hunter [phone]  Specialists:    Pt taught proper COUGH technique  IS REVIEWED WITH PT AS WELL AS BENEFITS OF USING IS IN SEDENTARY PTS.  DIAPHRAGMATIC BREATHING EXERCISE INSTRUCTIONS GIVEN    History of smoking:   DENIES                 Quit date:         Secondhand smoke:DENIES    Past procedures with Oxygen desaturation or delayed awakening:DENIES     Respiratory history:CHANELLE no CPAP  Asthma                                                                    Respiratory meds:  PT uses MDI PRN with PROAIR.   MDI instructions given verbally & written along with spacer.  Pt to use home MDI's morning of surgery & bring to Hospital.PT uses

## 2024-08-27 NOTE — PROGRESS NOTES
PLEASE CONTINUE TAKING ALL PRESCRIPTION MEDICATIONS UP TO THE DAY OF SURGERY UNLESS OTHERWISE DIRECTED BELOW.    DISCONTINUE all vitamins, herbals and supplements 3 weeks prior to surgery. DISCONTINUE Non-Steroidal Anti-Inflammatory (NSAIDS) such as Advil, Ibuprofen, Motrin, Naproxen and Aleve 5 days prior to surgery.       Home Medications to take  the day of surgery    Diltiazem        81 mg Aspirin   Nexium            Zafirlukast   USE/Bring Albuterol inhaler; nebulizer and Fluticasone Propionate Diskus     Home Medications to Hold- please continue all other medications except these.    HOLD Eliquis for 72 hours prior to surgery and replace with one 81 mg Aspirin once a day        Comments   On the day before surgery please take 2 Extra Strength Tylenol in the morning and then again before bed. You may substitute for Tylenol 650 mg.      Bring Dynahex wash and Incentive Spirometer with you to hospital on the day of surgery.     BRING Zafirlukast in original bottle; bring inhalers and nebulizer       Please do not bring home medications with you on the day of surgery unless otherwise directed by your nurse.  If you are instructed to bring home medications, please give them to your nurse as they will be administered by the nursing staff.    If you have any questions, please call [facility] [phone].    A copy of this note was provided to the patient for reference.

## 2024-08-29 ENCOUNTER — PREP FOR PROCEDURE (OUTPATIENT)
Dept: ORTHOPEDIC SURGERY | Age: 84
End: 2024-08-29

## 2024-08-29 DIAGNOSIS — M16.12 PRIMARY OSTEOARTHRITIS OF LEFT HIP: Primary | ICD-10-CM

## 2024-08-29 RX ORDER — ACETAMINOPHEN 500 MG
1000 TABLET ORAL ONCE
Status: CANCELLED | OUTPATIENT
Start: 2024-08-29 | End: 2024-08-29

## 2024-09-13 DIAGNOSIS — M16.12 PRIMARY OSTEOARTHRITIS OF LEFT HIP: Primary | ICD-10-CM

## 2024-09-13 RX ORDER — ASPIRIN 81 MG/1
81 TABLET ORAL 2 TIMES DAILY
Qty: 60 TABLET | Refills: 0 | Status: SHIPPED | OUTPATIENT
Start: 2024-09-13

## 2024-09-13 RX ORDER — CELECOXIB 200 MG/1
200 CAPSULE ORAL DAILY
Qty: 30 CAPSULE | Refills: 0 | Status: SHIPPED | OUTPATIENT
Start: 2024-09-13

## 2024-09-13 RX ORDER — OXYCODONE HYDROCHLORIDE 5 MG/1
5-10 TABLET ORAL EVERY 4 HOURS PRN
Qty: 60 TABLET | Refills: 0 | Status: SHIPPED | OUTPATIENT
Start: 2024-09-13 | End: 2024-09-20

## 2024-09-13 RX ORDER — METHOCARBAMOL 750 MG/1
TABLET, FILM COATED ORAL
Qty: 40 TABLET | Refills: 0 | Status: SHIPPED | OUTPATIENT
Start: 2024-09-13

## 2024-09-13 RX ORDER — ONDANSETRON 4 MG/1
4 TABLET, FILM COATED ORAL EVERY 6 HOURS PRN
Qty: 30 TABLET | Refills: 0 | Status: SHIPPED | OUTPATIENT
Start: 2024-09-13

## 2024-09-17 ENCOUNTER — ANESTHESIA (OUTPATIENT)
Dept: SURGERY | Age: 84
End: 2024-09-17
Payer: COMMERCIAL

## 2024-09-17 ENCOUNTER — HOSPITAL ENCOUNTER (OUTPATIENT)
Age: 84
Discharge: HOME HEALTH | End: 2024-09-19
Attending: ORTHOPAEDIC SURGERY | Admitting: ORTHOPAEDIC SURGERY
Payer: COMMERCIAL

## 2024-09-17 ENCOUNTER — ANESTHESIA EVENT (OUTPATIENT)
Dept: SURGERY | Age: 84
End: 2024-09-17
Payer: COMMERCIAL

## 2024-09-17 ENCOUNTER — APPOINTMENT (OUTPATIENT)
Dept: GENERAL RADIOLOGY | Age: 84
End: 2024-09-17
Attending: ORTHOPAEDIC SURGERY
Payer: COMMERCIAL

## 2024-09-17 DIAGNOSIS — Z96.642 HX OF TOTAL HIP ARTHROPLASTY, LEFT: Primary | ICD-10-CM

## 2024-09-17 PROBLEM — I48.91 ATRIAL FIBRILLATION (HCC): Status: ACTIVE | Noted: 2024-09-17

## 2024-09-17 LAB
ABO + RH BLD: NORMAL
BLOOD GROUP ANTIBODIES SERPL: NORMAL
SPECIMEN EXP DATE BLD: NORMAL

## 2024-09-17 PROCEDURE — 97530 THERAPEUTIC ACTIVITIES: CPT

## 2024-09-17 PROCEDURE — 2580000003 HC RX 258: Performed by: ORTHOPAEDIC SURGERY

## 2024-09-17 PROCEDURE — 3700000000 HC ANESTHESIA ATTENDED CARE: Performed by: ORTHOPAEDIC SURGERY

## 2024-09-17 PROCEDURE — 2580000003 HC RX 258: Performed by: NURSE ANESTHETIST, CERTIFIED REGISTERED

## 2024-09-17 PROCEDURE — 36415 COLL VENOUS BLD VENIPUNCTURE: CPT

## 2024-09-17 PROCEDURE — 2580000003 HC RX 258: Performed by: PHYSICIAN ASSISTANT

## 2024-09-17 PROCEDURE — 86850 RBC ANTIBODY SCREEN: CPT

## 2024-09-17 PROCEDURE — 6360000002 HC RX W HCPCS: Performed by: NURSE ANESTHETIST, CERTIFIED REGISTERED

## 2024-09-17 PROCEDURE — 2500000003 HC RX 250 WO HCPCS: Performed by: ORTHOPAEDIC SURGERY

## 2024-09-17 PROCEDURE — 86901 BLOOD TYPING SEROLOGIC RH(D): CPT

## 2024-09-17 PROCEDURE — 6360000002 HC RX W HCPCS: Performed by: ORTHOPAEDIC SURGERY

## 2024-09-17 PROCEDURE — 86900 BLOOD TYPING SEROLOGIC ABO: CPT

## 2024-09-17 PROCEDURE — 2709999900 HC NON-CHARGEABLE SUPPLY: Performed by: ORTHOPAEDIC SURGERY

## 2024-09-17 PROCEDURE — 3600000015 HC SURGERY LEVEL 5 ADDTL 15MIN: Performed by: ORTHOPAEDIC SURGERY

## 2024-09-17 PROCEDURE — 3600000005 HC SURGERY LEVEL 5 BASE: Performed by: ORTHOPAEDIC SURGERY

## 2024-09-17 PROCEDURE — 97535 SELF CARE MNGMENT TRAINING: CPT

## 2024-09-17 PROCEDURE — 97165 OT EVAL LOW COMPLEX 30 MIN: CPT

## 2024-09-17 PROCEDURE — 94760 N-INVAS EAR/PLS OXIMETRY 1: CPT

## 2024-09-17 PROCEDURE — 2500000003 HC RX 250 WO HCPCS: Performed by: NURSE ANESTHETIST, CERTIFIED REGISTERED

## 2024-09-17 PROCEDURE — 6360000002 HC RX W HCPCS: Performed by: PHYSICIAN ASSISTANT

## 2024-09-17 PROCEDURE — 97110 THERAPEUTIC EXERCISES: CPT

## 2024-09-17 PROCEDURE — 27130 TOTAL HIP ARTHROPLASTY: CPT | Performed by: PHYSICIAN ASSISTANT

## 2024-09-17 PROCEDURE — 3700000001 HC ADD 15 MINUTES (ANESTHESIA): Performed by: ORTHOPAEDIC SURGERY

## 2024-09-17 PROCEDURE — 7100000000 HC PACU RECOVERY - FIRST 15 MIN: Performed by: ORTHOPAEDIC SURGERY

## 2024-09-17 PROCEDURE — 2580000003 HC RX 258: Performed by: ANESTHESIOLOGY

## 2024-09-17 PROCEDURE — C1776 JOINT DEVICE (IMPLANTABLE): HCPCS | Performed by: ORTHOPAEDIC SURGERY

## 2024-09-17 PROCEDURE — 6370000000 HC RX 637 (ALT 250 FOR IP): Performed by: PHYSICIAN ASSISTANT

## 2024-09-17 PROCEDURE — 72170 X-RAY EXAM OF PELVIS: CPT

## 2024-09-17 PROCEDURE — 94761 N-INVAS EAR/PLS OXIMETRY MLT: CPT

## 2024-09-17 PROCEDURE — 2720000010 HC SURG SUPPLY STERILE: Performed by: ORTHOPAEDIC SURGERY

## 2024-09-17 PROCEDURE — 27130 TOTAL HIP ARTHROPLASTY: CPT | Performed by: ORTHOPAEDIC SURGERY

## 2024-09-17 PROCEDURE — 6360000002 HC RX W HCPCS: Performed by: ANESTHESIOLOGY

## 2024-09-17 PROCEDURE — 97161 PT EVAL LOW COMPLEX 20 MIN: CPT

## 2024-09-17 PROCEDURE — 7100000001 HC PACU RECOVERY - ADDTL 15 MIN: Performed by: ORTHOPAEDIC SURGERY

## 2024-09-17 DEVICE — HIP H2 TOT ADV OTHER HD IMPL CAPPED SYNTHES: Type: IMPLANTABLE DEVICE | Site: HIP | Status: FUNCTIONAL

## 2024-09-17 DEVICE — PINNACLE HIP SOLUTIONS ALTRX POLYETHYLENE ACETABULAR LINER NEUTRAL 36MM ID 54MM OD
Type: IMPLANTABLE DEVICE | Site: HIP | Status: FUNCTIONAL
Brand: PINNACLE ALTRX

## 2024-09-17 DEVICE — BIOLOX DELTA CERAMIC FEMORAL HEAD +1.5 36MM DIA 12/14 TAPER
Type: IMPLANTABLE DEVICE | Site: HIP | Status: FUNCTIONAL
Brand: BIOLOX DELTA

## 2024-09-17 DEVICE — EMPHASYS 12/14 FEMORAL STEM COLLARED STANDARD OFFSET HA COATED  8 SO
Type: IMPLANTABLE DEVICE | Site: HIP | Status: FUNCTIONAL
Brand: EMPHASYS

## 2024-09-17 DEVICE — POWDER SURG CELLERATE RX 1 GM HYDROL COLLEGEN: Type: IMPLANTABLE DEVICE | Site: HIP | Status: FUNCTIONAL

## 2024-09-17 DEVICE — PINNACLE CANCELLOUS BONE SCREW 6.5MM X 25MM
Type: IMPLANTABLE DEVICE | Site: HIP | Status: FUNCTIONAL
Brand: PINNACLE

## 2024-09-17 DEVICE — PINNACLE GRIPTION ACETABULAR SHELL MULTI-HOLE 54MM OD
Type: IMPLANTABLE DEVICE | Site: HIP | Status: FUNCTIONAL
Brand: PINNACLE GRIPTION

## 2024-09-17 RX ORDER — MAGNESIUM HYDROXIDE/ALUMINUM HYDROXICE/SIMETHICONE 120; 1200; 1200 MG/30ML; MG/30ML; MG/30ML
15 SUSPENSION ORAL EVERY 6 HOURS PRN
Status: DISCONTINUED | OUTPATIENT
Start: 2024-09-17 | End: 2024-09-19 | Stop reason: HOSPADM

## 2024-09-17 RX ORDER — HYDROCODONE BITARTRATE AND ACETAMINOPHEN 7.5; 325 MG/1; MG/1
1 TABLET ORAL EVERY 4 HOURS PRN
Status: DISCONTINUED | OUTPATIENT
Start: 2024-09-17 | End: 2024-09-19 | Stop reason: HOSPADM

## 2024-09-17 RX ORDER — ACETAMINOPHEN 325 MG/1
650 TABLET ORAL EVERY 6 HOURS
Status: DISCONTINUED | OUTPATIENT
Start: 2024-09-17 | End: 2024-09-19 | Stop reason: HOSPADM

## 2024-09-17 RX ORDER — MONTELUKAST SODIUM 10 MG/1
10 TABLET ORAL NIGHTLY
Status: DISCONTINUED | OUTPATIENT
Start: 2024-09-17 | End: 2024-09-19 | Stop reason: HOSPADM

## 2024-09-17 RX ORDER — OXYCODONE HYDROCHLORIDE 5 MG/1
5 TABLET ORAL
Status: DISCONTINUED | OUTPATIENT
Start: 2024-09-17 | End: 2024-09-17

## 2024-09-17 RX ORDER — BUDESONIDE AND FORMOTEROL FUMARATE DIHYDRATE 160; 4.5 UG/1; UG/1
2 AEROSOL RESPIRATORY (INHALATION)
Status: DISCONTINUED | OUTPATIENT
Start: 2024-09-17 | End: 2024-09-17

## 2024-09-17 RX ORDER — ACETAMINOPHEN 500 MG
1000 TABLET ORAL ONCE
Status: DISCONTINUED | OUTPATIENT
Start: 2024-09-17 | End: 2024-09-17 | Stop reason: HOSPADM

## 2024-09-17 RX ORDER — PROMETHAZINE HYDROCHLORIDE 25 MG/1
25 TABLET ORAL EVERY 6 HOURS PRN
Status: DISCONTINUED | OUTPATIENT
Start: 2024-09-17 | End: 2024-09-19 | Stop reason: HOSPADM

## 2024-09-17 RX ORDER — ALBUTEROL SULFATE 90 UG/1
2 INHALANT RESPIRATORY (INHALATION) EVERY 4 HOURS PRN
Status: DISCONTINUED | OUTPATIENT
Start: 2024-09-17 | End: 2024-09-17

## 2024-09-17 RX ORDER — IPRATROPIUM BROMIDE AND ALBUTEROL SULFATE 2.5; .5 MG/3ML; MG/3ML
1 SOLUTION RESPIRATORY (INHALATION)
Status: DISCONTINUED | OUTPATIENT
Start: 2024-09-17 | End: 2024-09-17 | Stop reason: HOSPADM

## 2024-09-17 RX ORDER — LIDOCAINE HYDROCHLORIDE 10 MG/ML
1 INJECTION, SOLUTION INFILTRATION; PERINEURAL
Status: DISCONTINUED | OUTPATIENT
Start: 2024-09-17 | End: 2024-09-17 | Stop reason: HOSPADM

## 2024-09-17 RX ORDER — KETOROLAC TROMETHAMINE 30 MG/ML
INJECTION, SOLUTION INTRAMUSCULAR; INTRAVENOUS PRN
Status: DISCONTINUED | OUTPATIENT
Start: 2024-09-17 | End: 2024-09-17 | Stop reason: ALTCHOICE

## 2024-09-17 RX ORDER — TRANEXAMIC ACID 100 MG/ML
INJECTION, SOLUTION INTRAVENOUS
Status: DISCONTINUED | OUTPATIENT
Start: 2024-09-17 | End: 2024-09-17 | Stop reason: SDUPTHER

## 2024-09-17 RX ORDER — ALBUTEROL SULFATE 0.83 MG/ML
2.5 SOLUTION RESPIRATORY (INHALATION) EVERY 4 HOURS PRN
Status: DISCONTINUED | OUTPATIENT
Start: 2024-09-17 | End: 2024-09-17

## 2024-09-17 RX ORDER — ENOXAPARIN SODIUM 100 MG/ML
40 INJECTION SUBCUTANEOUS DAILY
Status: DISCONTINUED | OUTPATIENT
Start: 2024-09-18 | End: 2024-09-19 | Stop reason: HOSPADM

## 2024-09-17 RX ORDER — HYDROCODONE BITARTRATE AND ACETAMINOPHEN 7.5; 325 MG/1; MG/1
2 TABLET ORAL EVERY 4 HOURS PRN
Status: DISCONTINUED | OUTPATIENT
Start: 2024-09-17 | End: 2024-09-19 | Stop reason: HOSPADM

## 2024-09-17 RX ORDER — SODIUM CHLORIDE 0.9 % (FLUSH) 0.9 %
5-40 SYRINGE (ML) INJECTION PRN
Status: DISCONTINUED | OUTPATIENT
Start: 2024-09-17 | End: 2024-09-17 | Stop reason: HOSPADM

## 2024-09-17 RX ORDER — ASPIRIN 81 MG/1
81 TABLET ORAL 2 TIMES DAILY
Status: DISCONTINUED | OUTPATIENT
Start: 2024-09-17 | End: 2024-09-19 | Stop reason: HOSPADM

## 2024-09-17 RX ORDER — METHOCARBAMOL 750 MG/1
750 TABLET, FILM COATED ORAL 4 TIMES DAILY PRN
Status: DISCONTINUED | OUTPATIENT
Start: 2024-09-17 | End: 2024-09-19 | Stop reason: HOSPADM

## 2024-09-17 RX ORDER — OXYCODONE HYDROCHLORIDE 5 MG/1
10 TABLET ORAL EVERY 4 HOURS PRN
Status: DISCONTINUED | OUTPATIENT
Start: 2024-09-17 | End: 2024-09-17

## 2024-09-17 RX ORDER — OXYCODONE HYDROCHLORIDE 5 MG/1
5 TABLET ORAL EVERY 4 HOURS PRN
Status: DISCONTINUED | OUTPATIENT
Start: 2024-09-17 | End: 2024-09-17

## 2024-09-17 RX ORDER — PROPOFOL 10 MG/ML
INJECTION, EMULSION INTRAVENOUS
Status: DISCONTINUED | OUTPATIENT
Start: 2024-09-17 | End: 2024-09-17 | Stop reason: SDUPTHER

## 2024-09-17 RX ORDER — MIDAZOLAM HYDROCHLORIDE 2 MG/2ML
2 INJECTION, SOLUTION INTRAMUSCULAR; INTRAVENOUS
Status: DISCONTINUED | OUTPATIENT
Start: 2024-09-17 | End: 2024-09-17 | Stop reason: HOSPADM

## 2024-09-17 RX ORDER — FENTANYL CITRATE 50 UG/ML
100 INJECTION, SOLUTION INTRAMUSCULAR; INTRAVENOUS
Status: DISCONTINUED | OUTPATIENT
Start: 2024-09-17 | End: 2024-09-17 | Stop reason: HOSPADM

## 2024-09-17 RX ORDER — HYDROMORPHONE HYDROCHLORIDE 1 MG/ML
0.5 INJECTION, SOLUTION INTRAMUSCULAR; INTRAVENOUS; SUBCUTANEOUS
Status: DISCONTINUED | OUTPATIENT
Start: 2024-09-17 | End: 2024-09-19 | Stop reason: HOSPADM

## 2024-09-17 RX ORDER — CELECOXIB 200 MG/1
200 CAPSULE ORAL DAILY
Status: DISCONTINUED | OUTPATIENT
Start: 2024-09-18 | End: 2024-09-19 | Stop reason: HOSPADM

## 2024-09-17 RX ORDER — ARFORMOTEROL TARTRATE 15 UG/2ML
15 SOLUTION RESPIRATORY (INHALATION)
Status: DISCONTINUED | OUTPATIENT
Start: 2024-09-17 | End: 2024-09-17

## 2024-09-17 RX ORDER — SODIUM CHLORIDE 9 MG/ML
INJECTION, SOLUTION INTRAVENOUS CONTINUOUS
Status: DISCONTINUED | OUTPATIENT
Start: 2024-09-17 | End: 2024-09-19 | Stop reason: HOSPADM

## 2024-09-17 RX ORDER — DIPHENHYDRAMINE HYDROCHLORIDE 50 MG/ML
25 INJECTION INTRAMUSCULAR; INTRAVENOUS EVERY 6 HOURS PRN
Status: DISCONTINUED | OUTPATIENT
Start: 2024-09-17 | End: 2024-09-19 | Stop reason: HOSPADM

## 2024-09-17 RX ORDER — PROCHLORPERAZINE EDISYLATE 5 MG/ML
5 INJECTION INTRAMUSCULAR; INTRAVENOUS
Status: DISCONTINUED | OUTPATIENT
Start: 2024-09-17 | End: 2024-09-17 | Stop reason: HOSPADM

## 2024-09-17 RX ORDER — NALOXONE HYDROCHLORIDE 0.4 MG/ML
INJECTION, SOLUTION INTRAMUSCULAR; INTRAVENOUS; SUBCUTANEOUS PRN
Status: DISCONTINUED | OUTPATIENT
Start: 2024-09-17 | End: 2024-09-17 | Stop reason: HOSPADM

## 2024-09-17 RX ORDER — SODIUM CHLORIDE, SODIUM LACTATE, POTASSIUM CHLORIDE, CALCIUM CHLORIDE 600; 310; 30; 20 MG/100ML; MG/100ML; MG/100ML; MG/100ML
INJECTION, SOLUTION INTRAVENOUS
Status: DISCONTINUED | OUTPATIENT
Start: 2024-09-17 | End: 2024-09-17 | Stop reason: SDUPTHER

## 2024-09-17 RX ORDER — ALBUTEROL SULFATE 90 UG/1
2 INHALANT RESPIRATORY (INHALATION) EVERY 6 HOURS PRN
Status: DISCONTINUED | OUTPATIENT
Start: 2024-09-17 | End: 2024-09-17

## 2024-09-17 RX ORDER — ONDANSETRON 2 MG/ML
4 INJECTION INTRAMUSCULAR; INTRAVENOUS EVERY 6 HOURS PRN
Status: DISCONTINUED | OUTPATIENT
Start: 2024-09-17 | End: 2024-09-19 | Stop reason: HOSPADM

## 2024-09-17 RX ORDER — SODIUM CHLORIDE 0.9 % (FLUSH) 0.9 %
5-40 SYRINGE (ML) INJECTION EVERY 12 HOURS SCHEDULED
Status: DISCONTINUED | OUTPATIENT
Start: 2024-09-17 | End: 2024-09-19 | Stop reason: HOSPADM

## 2024-09-17 RX ORDER — NALOXONE HYDROCHLORIDE 0.4 MG/ML
0.4 INJECTION, SOLUTION INTRAMUSCULAR; INTRAVENOUS; SUBCUTANEOUS PRN
Status: DISCONTINUED | OUTPATIENT
Start: 2024-09-17 | End: 2024-09-19 | Stop reason: HOSPADM

## 2024-09-17 RX ORDER — BUDESONIDE 0.5 MG/2ML
0.5 INHALANT ORAL
Status: DISCONTINUED | OUTPATIENT
Start: 2024-09-17 | End: 2024-09-17

## 2024-09-17 RX ORDER — SODIUM CHLORIDE 0.9 % (FLUSH) 0.9 %
5-40 SYRINGE (ML) INJECTION EVERY 12 HOURS SCHEDULED
Status: DISCONTINUED | OUTPATIENT
Start: 2024-09-17 | End: 2024-09-17 | Stop reason: HOSPADM

## 2024-09-17 RX ORDER — ACETAMINOPHEN 500 MG
1000 TABLET ORAL ONCE
Status: COMPLETED | OUTPATIENT
Start: 2024-09-17 | End: 2024-09-17

## 2024-09-17 RX ORDER — HALOPERIDOL 5 MG/ML
1 INJECTION INTRAMUSCULAR
Status: DISCONTINUED | OUTPATIENT
Start: 2024-09-17 | End: 2024-09-17 | Stop reason: HOSPADM

## 2024-09-17 RX ORDER — DEXAMETHASONE SODIUM PHOSPHATE 10 MG/ML
10 INJECTION INTRAMUSCULAR; INTRAVENOUS ONCE
Status: DISCONTINUED | OUTPATIENT
Start: 2024-09-18 | End: 2024-09-19 | Stop reason: HOSPADM

## 2024-09-17 RX ORDER — PANTOPRAZOLE SODIUM 40 MG/1
40 TABLET, DELAYED RELEASE ORAL
Status: DISCONTINUED | OUTPATIENT
Start: 2024-09-18 | End: 2024-09-19 | Stop reason: HOSPADM

## 2024-09-17 RX ORDER — ALBUTEROL SULFATE 0.83 MG/ML
2.5 SOLUTION RESPIRATORY (INHALATION) EVERY 6 HOURS PRN
Status: DISCONTINUED | OUTPATIENT
Start: 2024-09-17 | End: 2024-09-19 | Stop reason: HOSPADM

## 2024-09-17 RX ORDER — MIDAZOLAM HYDROCHLORIDE 1 MG/ML
INJECTION INTRAMUSCULAR; INTRAVENOUS
Status: DISCONTINUED | OUTPATIENT
Start: 2024-09-17 | End: 2024-09-17 | Stop reason: SDUPTHER

## 2024-09-17 RX ORDER — LANOLIN ALCOHOL/MO/W.PET/CERES
3 CREAM (GRAM) TOPICAL NIGHTLY PRN
Status: DISCONTINUED | OUTPATIENT
Start: 2024-09-17 | End: 2024-09-19 | Stop reason: HOSPADM

## 2024-09-17 RX ORDER — SODIUM CHLORIDE 9 MG/ML
INJECTION, SOLUTION INTRAVENOUS PRN
Status: DISCONTINUED | OUTPATIENT
Start: 2024-09-17 | End: 2024-09-19 | Stop reason: HOSPADM

## 2024-09-17 RX ORDER — DIPHENHYDRAMINE HCL 25 MG
25 CAPSULE ORAL EVERY 6 HOURS PRN
Status: DISCONTINUED | OUTPATIENT
Start: 2024-09-17 | End: 2024-09-19 | Stop reason: HOSPADM

## 2024-09-17 RX ORDER — SODIUM CHLORIDE, SODIUM LACTATE, POTASSIUM CHLORIDE, CALCIUM CHLORIDE 600; 310; 30; 20 MG/100ML; MG/100ML; MG/100ML; MG/100ML
INJECTION, SOLUTION INTRAVENOUS CONTINUOUS
Status: DISCONTINUED | OUTPATIENT
Start: 2024-09-17 | End: 2024-09-17 | Stop reason: HOSPADM

## 2024-09-17 RX ORDER — SODIUM CHLORIDE 9 MG/ML
INJECTION, SOLUTION INTRAVENOUS PRN
Status: DISCONTINUED | OUTPATIENT
Start: 2024-09-17 | End: 2024-09-17 | Stop reason: HOSPADM

## 2024-09-17 RX ORDER — SENNA AND DOCUSATE SODIUM 50; 8.6 MG/1; MG/1
1 TABLET, FILM COATED ORAL 2 TIMES DAILY
Status: DISCONTINUED | OUTPATIENT
Start: 2024-09-17 | End: 2024-09-19 | Stop reason: HOSPADM

## 2024-09-17 RX ORDER — DILTIAZEM HYDROCHLORIDE 240 MG/1
240 CAPSULE, COATED, EXTENDED RELEASE ORAL DAILY
Status: DISCONTINUED | OUTPATIENT
Start: 2024-09-18 | End: 2024-09-19 | Stop reason: HOSPADM

## 2024-09-17 RX ORDER — HYDROCODONE BITARTRATE AND ACETAMINOPHEN 7.5; 325 MG/1; MG/1
1-2 TABLET ORAL EVERY 4 HOURS PRN
Qty: 60 TABLET | Refills: 0 | Status: SHIPPED | OUTPATIENT
Start: 2024-09-17 | End: 2024-09-24

## 2024-09-17 RX ORDER — HYDROMORPHONE HYDROCHLORIDE 1 MG/ML
1 INJECTION, SOLUTION INTRAMUSCULAR; INTRAVENOUS; SUBCUTANEOUS
Status: DISCONTINUED | OUTPATIENT
Start: 2024-09-17 | End: 2024-09-19 | Stop reason: HOSPADM

## 2024-09-17 RX ORDER — SODIUM CHLORIDE 0.9 % (FLUSH) 0.9 %
5-40 SYRINGE (ML) INJECTION PRN
Status: DISCONTINUED | OUTPATIENT
Start: 2024-09-17 | End: 2024-09-19 | Stop reason: HOSPADM

## 2024-09-17 RX ORDER — ROPIVACAINE HYDROCHLORIDE 2 MG/ML
INJECTION, SOLUTION EPIDURAL; INFILTRATION; PERINEURAL PRN
Status: DISCONTINUED | OUTPATIENT
Start: 2024-09-17 | End: 2024-09-17 | Stop reason: ALTCHOICE

## 2024-09-17 RX ADMIN — MONTELUKAST 10 MG: 10 TABLET, FILM COATED ORAL at 21:07

## 2024-09-17 RX ADMIN — PROPOFOL 25 MCG/KG/MIN: 10 INJECTION, EMULSION INTRAVENOUS at 07:19

## 2024-09-17 RX ADMIN — ACETAMINOPHEN 1000 MG: 500 TABLET, FILM COATED ORAL at 06:00

## 2024-09-17 RX ADMIN — SODIUM CHLORIDE, POTASSIUM CHLORIDE, SODIUM LACTATE AND CALCIUM CHLORIDE: 600; 310; 30; 20 INJECTION, SOLUTION INTRAVENOUS at 06:10

## 2024-09-17 RX ADMIN — SODIUM CHLORIDE, PRESERVATIVE FREE 10 ML: 5 INJECTION INTRAVENOUS at 21:08

## 2024-09-17 RX ADMIN — MEPIVACAINE HYDROCHLORIDE 60 MG: 20 INJECTION, SOLUTION EPIDURAL; INFILTRATION at 07:12

## 2024-09-17 RX ADMIN — ACETAMINOPHEN 650 MG: 325 TABLET, FILM COATED ORAL at 18:15

## 2024-09-17 RX ADMIN — ACETAMINOPHEN 650 MG: 325 TABLET, FILM COATED ORAL at 23:23

## 2024-09-17 RX ADMIN — CEFAZOLIN 2000 MG: 10 INJECTION, POWDER, FOR SOLUTION INTRAVENOUS at 15:36

## 2024-09-17 RX ADMIN — MIDAZOLAM 2 MG: 1 INJECTION INTRAMUSCULAR; INTRAVENOUS at 06:59

## 2024-09-17 RX ADMIN — SENNOSIDES AND DOCUSATE SODIUM 1 TABLET: 50; 8.6 TABLET ORAL at 21:07

## 2024-09-17 RX ADMIN — ASPIRIN 81 MG: 81 TABLET, COATED ORAL at 21:07

## 2024-09-17 RX ADMIN — ACETAMINOPHEN 650 MG: 325 TABLET, FILM COATED ORAL at 11:47

## 2024-09-17 RX ADMIN — HYDROCODONE BITARTRATE AND ACETAMINOPHEN 1 TABLET: 7.5; 325 TABLET ORAL at 11:47

## 2024-09-17 RX ADMIN — CEFAZOLIN 2000 MG: 10 INJECTION, POWDER, FOR SOLUTION INTRAVENOUS at 23:24

## 2024-09-17 RX ADMIN — SODIUM CHLORIDE, SODIUM LACTATE, POTASSIUM CHLORIDE, AND CALCIUM CHLORIDE: 600; 310; 30; 20 INJECTION, SOLUTION INTRAVENOUS at 06:58

## 2024-09-17 RX ADMIN — Medication 2000 MG: at 06:58

## 2024-09-17 RX ADMIN — TRANEXAMIC ACID 1000 MG: 100 INJECTION, SOLUTION INTRAVENOUS at 07:03

## 2024-09-17 ASSESSMENT — PAIN DESCRIPTION - DESCRIPTORS
DESCRIPTORS: ACHING

## 2024-09-17 ASSESSMENT — PAIN SCALES - GENERAL
PAINLEVEL_OUTOF10: 6
PAINLEVEL_OUTOF10: 6
PAINLEVEL_OUTOF10: 0
PAINLEVEL_OUTOF10: 4

## 2024-09-17 ASSESSMENT — PAIN DESCRIPTION - LOCATION
LOCATION: HIP
LOCATION: HIP

## 2024-09-17 ASSESSMENT — PAIN DESCRIPTION - ORIENTATION
ORIENTATION: LEFT
ORIENTATION: LEFT

## 2024-09-17 ASSESSMENT — PAIN - FUNCTIONAL ASSESSMENT: PAIN_FUNCTIONAL_ASSESSMENT: 0-10

## 2024-09-18 LAB
ANION GAP SERPL CALC-SCNC: 8 MMOL/L (ref 9–18)
BUN SERPL-MCNC: 12 MG/DL (ref 8–23)
CALCIUM SERPL-MCNC: 8.4 MG/DL (ref 8.8–10.2)
CHLORIDE SERPL-SCNC: 103 MMOL/L (ref 98–107)
CO2 SERPL-SCNC: 26 MMOL/L (ref 20–28)
CREAT SERPL-MCNC: 0.72 MG/DL (ref 0.6–1.1)
GLUCOSE SERPL-MCNC: 113 MG/DL (ref 70–99)
POTASSIUM SERPL-SCNC: 4.5 MMOL/L (ref 3.5–5.1)
SODIUM SERPL-SCNC: 137 MMOL/L (ref 136–145)

## 2024-09-18 PROCEDURE — 94761 N-INVAS EAR/PLS OXIMETRY MLT: CPT

## 2024-09-18 PROCEDURE — 80048 BASIC METABOLIC PNL TOTAL CA: CPT

## 2024-09-18 PROCEDURE — 97535 SELF CARE MNGMENT TRAINING: CPT

## 2024-09-18 PROCEDURE — 97530 THERAPEUTIC ACTIVITIES: CPT

## 2024-09-18 PROCEDURE — 94760 N-INVAS EAR/PLS OXIMETRY 1: CPT

## 2024-09-18 PROCEDURE — 6360000002 HC RX W HCPCS: Performed by: PHYSICIAN ASSISTANT

## 2024-09-18 PROCEDURE — 36415 COLL VENOUS BLD VENIPUNCTURE: CPT

## 2024-09-18 PROCEDURE — 6370000000 HC RX 637 (ALT 250 FOR IP): Performed by: PHYSICIAN ASSISTANT

## 2024-09-18 RX ADMIN — MONTELUKAST 10 MG: 10 TABLET, FILM COATED ORAL at 20:52

## 2024-09-18 RX ADMIN — ASPIRIN 81 MG: 81 TABLET, COATED ORAL at 20:52

## 2024-09-18 RX ADMIN — PANTOPRAZOLE SODIUM 40 MG: 40 TABLET, DELAYED RELEASE ORAL at 06:21

## 2024-09-18 RX ADMIN — ACETAMINOPHEN 650 MG: 325 TABLET, FILM COATED ORAL at 12:09

## 2024-09-18 RX ADMIN — ACETAMINOPHEN 650 MG: 325 TABLET, FILM COATED ORAL at 18:37

## 2024-09-18 RX ADMIN — HYDROCODONE BITARTRATE AND ACETAMINOPHEN 1 TABLET: 7.5; 325 TABLET ORAL at 06:21

## 2024-09-18 RX ADMIN — SENNOSIDES AND DOCUSATE SODIUM 1 TABLET: 50; 8.6 TABLET ORAL at 20:52

## 2024-09-18 RX ADMIN — DILTIAZEM HYDROCHLORIDE 240 MG: 240 CAPSULE, EXTENDED RELEASE ORAL at 08:55

## 2024-09-18 RX ADMIN — ENOXAPARIN SODIUM 40 MG: 100 INJECTION SUBCUTANEOUS at 08:58

## 2024-09-18 RX ADMIN — SENNOSIDES AND DOCUSATE SODIUM 1 TABLET: 50; 8.6 TABLET ORAL at 08:55

## 2024-09-18 RX ADMIN — CELECOXIB 200 MG: 200 CAPSULE ORAL at 08:55

## 2024-09-18 RX ADMIN — ASPIRIN 81 MG: 81 TABLET, COATED ORAL at 08:54

## 2024-09-18 RX ADMIN — ACETAMINOPHEN 650 MG: 325 TABLET, FILM COATED ORAL at 06:21

## 2024-09-18 ASSESSMENT — PAIN SCALES - GENERAL
PAINLEVEL_OUTOF10: 2
PAINLEVEL_OUTOF10: 3
PAINLEVEL_OUTOF10: 0

## 2024-09-18 ASSESSMENT — PAIN DESCRIPTION - DESCRIPTORS: DESCRIPTORS: ACHING;SORE

## 2024-09-18 ASSESSMENT — PAIN DESCRIPTION - LOCATION: LOCATION: HIP

## 2024-09-18 ASSESSMENT — PAIN DESCRIPTION - ORIENTATION: ORIENTATION: LEFT

## 2024-09-18 ASSESSMENT — PAIN - FUNCTIONAL ASSESSMENT: PAIN_FUNCTIONAL_ASSESSMENT: PREVENTS OR INTERFERES SOME ACTIVE ACTIVITIES AND ADLS

## 2024-09-19 VITALS
TEMPERATURE: 98.4 F | RESPIRATION RATE: 18 BRPM | HEART RATE: 90 BPM | DIASTOLIC BLOOD PRESSURE: 75 MMHG | HEIGHT: 65 IN | BODY MASS INDEX: 35.84 KG/M2 | WEIGHT: 215.1 LBS | OXYGEN SATURATION: 96 % | SYSTOLIC BLOOD PRESSURE: 125 MMHG

## 2024-09-19 PROCEDURE — 6360000002 HC RX W HCPCS: Performed by: PHYSICIAN ASSISTANT

## 2024-09-19 PROCEDURE — 97530 THERAPEUTIC ACTIVITIES: CPT

## 2024-09-19 PROCEDURE — 6370000000 HC RX 637 (ALT 250 FOR IP): Performed by: PHYSICIAN ASSISTANT

## 2024-09-19 PROCEDURE — 2580000003 HC RX 258: Performed by: PHYSICIAN ASSISTANT

## 2024-09-19 RX ADMIN — ASPIRIN 81 MG: 81 TABLET, COATED ORAL at 08:57

## 2024-09-19 RX ADMIN — SODIUM CHLORIDE, PRESERVATIVE FREE 10 ML: 5 INJECTION INTRAVENOUS at 08:58

## 2024-09-19 RX ADMIN — ACETAMINOPHEN 650 MG: 325 TABLET, FILM COATED ORAL at 06:05

## 2024-09-19 RX ADMIN — ACETAMINOPHEN 650 MG: 325 TABLET, FILM COATED ORAL at 00:07

## 2024-09-19 RX ADMIN — PANTOPRAZOLE SODIUM 40 MG: 40 TABLET, DELAYED RELEASE ORAL at 06:05

## 2024-09-19 RX ADMIN — SENNOSIDES AND DOCUSATE SODIUM 1 TABLET: 50; 8.6 TABLET ORAL at 08:56

## 2024-09-19 RX ADMIN — ENOXAPARIN SODIUM 40 MG: 100 INJECTION SUBCUTANEOUS at 08:57

## 2024-09-19 RX ADMIN — CELECOXIB 200 MG: 200 CAPSULE ORAL at 08:56

## 2024-09-19 RX ADMIN — DILTIAZEM HYDROCHLORIDE 240 MG: 240 CAPSULE, EXTENDED RELEASE ORAL at 08:57

## 2024-09-20 ENCOUNTER — TELEPHONE (OUTPATIENT)
Dept: ORTHOPEDIC SURGERY | Age: 84
End: 2024-09-20

## 2024-09-24 ENCOUNTER — CLINICAL DOCUMENTATION (OUTPATIENT)
Dept: ORTHOPEDIC SURGERY | Age: 84
End: 2024-09-24

## 2024-09-24 ENCOUNTER — TELEPHONE (OUTPATIENT)
Dept: ORTHOPEDIC SURGERY | Age: 84
End: 2024-09-24

## 2024-10-23 ENCOUNTER — OFFICE VISIT (OUTPATIENT)
Dept: ORTHOPEDIC SURGERY | Age: 84
End: 2024-10-23

## 2024-10-23 DIAGNOSIS — Z96.642 S/P TOTAL LEFT HIP ARTHROPLASTY: Primary | ICD-10-CM

## 2024-10-23 DIAGNOSIS — Z09 FOLLOW-UP EXAMINATION: ICD-10-CM

## 2024-10-23 PROCEDURE — 99024 POSTOP FOLLOW-UP VISIT: CPT | Performed by: ORTHOPAEDIC SURGERY

## 2024-10-23 NOTE — PROGRESS NOTES
Name: Dior Martins  YOB: 1940  Gender: female  MRN: [id number]    LEFT Post-Op MILVIA: 4 weeks    This patient returns now 4 weeks s/p MILVIA.  They are doing well.  There have been no significant issues since last visit.  Patient is anxious to increase level of activity.  Today they complain of no significant symptoms.      PE: On exam today, incision looks good.  The patient is ambulating with a steady gait with the use of CANE.  There is minimal discomfort with gentle range of motion of the operative hip.     RADIOGRAPHS:  AP pelvis and table lateral of the LEFT hip which demonstrate well fixed implants in good position.  No sign of fracture or concern.    RADIOGRAPHIC IMPRESSION:  Stable LEFTTHA.    CLINICAL IMPRESSION AND PLAN:  Now four weeks s/p LEFT MILVIA. I advised them to continue to wean from their current assistive device and to resume activities as tolerated.   Further activity was discussed with the patient as was further pain medications.  The patient will return in 4-5 months.    Work/Activity Restrictions: OUT OF WORK - returning in about 6 weeks    BEULAH Kirby

## 2024-10-24 ENCOUNTER — TELEPHONE (OUTPATIENT)
Dept: ORTHOPEDIC SURGERY | Age: 84
End: 2024-10-24

## 2024-11-08 ENCOUNTER — OFFICE VISIT (OUTPATIENT)
Dept: FAMILY MEDICINE CLINIC | Facility: CLINIC | Age: 84
End: 2024-11-08
Payer: COMMERCIAL

## 2024-11-08 VITALS
HEART RATE: 96 BPM | HEIGHT: 65 IN | WEIGHT: 214 LBS | DIASTOLIC BLOOD PRESSURE: 84 MMHG | TEMPERATURE: 98.4 F | BODY MASS INDEX: 35.65 KG/M2 | OXYGEN SATURATION: 98 % | SYSTOLIC BLOOD PRESSURE: 136 MMHG

## 2024-11-08 DIAGNOSIS — J45.40 MODERATE PERSISTENT ASTHMA WITHOUT COMPLICATION: ICD-10-CM

## 2024-11-08 DIAGNOSIS — I48.91 ATRIAL FIBRILLATION, UNSPECIFIED TYPE (HCC): Primary | ICD-10-CM

## 2024-11-08 DIAGNOSIS — Z23 ENCOUNTER FOR IMMUNIZATION: ICD-10-CM

## 2024-11-08 DIAGNOSIS — I48.91 ATRIAL FIBRILLATION, UNSPECIFIED TYPE (HCC): ICD-10-CM

## 2024-11-08 PROCEDURE — 90471 IMMUNIZATION ADMIN: CPT | Performed by: FAMILY MEDICINE

## 2024-11-08 PROCEDURE — 90653 IIV ADJUVANT VACCINE IM: CPT | Performed by: FAMILY MEDICINE

## 2024-11-08 PROCEDURE — 99214 OFFICE O/P EST MOD 30 MIN: CPT | Performed by: FAMILY MEDICINE

## 2024-11-08 PROCEDURE — 1123F ACP DISCUSS/DSCN MKR DOCD: CPT | Performed by: FAMILY MEDICINE

## 2024-11-08 RX ORDER — DILTIAZEM HYDROCHLORIDE 240 MG/1
240 CAPSULE, COATED, EXTENDED RELEASE ORAL DAILY
Qty: 90 CAPSULE | Refills: 3 | Status: SHIPPED | OUTPATIENT
Start: 2024-11-08

## 2024-11-08 RX ORDER — ZAFIRLUKAST 20 MG/1
20 TABLET, FILM COATED ORAL 2 TIMES DAILY
Qty: 180 TABLET | Refills: 3 | Status: SHIPPED | OUTPATIENT
Start: 2024-11-08

## 2024-11-08 ASSESSMENT — ENCOUNTER SYMPTOMS
ROS SKIN COMMENTS: ITCHING
SHORTNESS OF BREATH: 0
CHEST TIGHTNESS: 0
ABDOMINAL PAIN: 0
WHEEZING: 0

## 2024-11-08 NOTE — PROGRESS NOTES
Dior Martins is a 83 y.o. female who presents today for the following:  Chief Complaint   Patient presents with    Follow-up       Allergies   Allergen Reactions    Azithromycin Hives    Codeine Hives    Oxycodone Hallucinations    Sulfa Antibiotics Rash       Current Outpatient Medications   Medication Sig Dispense Refill    apixaban (ELIQUIS) 5 MG TABS tablet Take 1 tablet by mouth 2 times daily 1 tablet 0    methocarbamol (ROBAXIN-750) 750 MG tablet Take 1 tablet by mouth every 6 hours as needed for spasms. 40 tablet 0    ondansetron (ZOFRAN) 4 MG tablet Take 1 tablet by mouth every 6 hours as needed for Nausea or Vomiting 30 tablet 0    aspirin 81 MG EC tablet Take 1 tablet by mouth 2 times daily 60 tablet 0    celecoxib (CELEBREX) 200 MG capsule Take 1 capsule by mouth daily 30 capsule 0    albuterol sulfate HFA (PROVENTIL;VENTOLIN;PROAIR) 108 (90 Base) MCG/ACT inhaler 2 puffs 4 times daily if needed for shortness of breath or wheezing 3 each 3    albuterol (PROVENTIL) (2.5 MG/3ML) 0.083% nebulizer solution 1 vial via nebulizer qid prn Dx - asthma J45.909.. Patient will call when refills are needed 360 mL 11    fluticasone-salmeterol (ADVAIR DISKUS) 250-50 MCG/ACT AEPB diskus inhaler 1 inhalation twice daily, rinse mouth after use (Patient taking differently: Inhale 1 puff into the lungs 2 times daily 1 inhalation twice daily, rinse mouth after use) 3 each 3    zafirlukast (ACCOLATE) 20 MG tablet Take 1 tablet by mouth 2 times daily TAKE ONE TABLET BY MOUTH TWO TIMES A  tablet 3    dilTIAZem (CARDIZEM CD) 240 MG extended release capsule Take 1 capsule by mouth daily 90 capsule 3    acetaminophen (TYLENOL) 325 MG tablet Take 1 tablet by mouth every 6 hours as needed for Pain      esomeprazole (NEXIUM) 20 MG delayed release capsule Take 1 capsule by mouth daily       No current facility-administered medications for this visit.       Past Medical History:   Diagnosis Date    Arthritis     Asthma

## 2024-11-15 ENCOUNTER — TELEPHONE (OUTPATIENT)
Dept: ORTHOPEDIC SURGERY | Age: 84
End: 2024-11-15

## 2024-12-04 ENCOUNTER — OFFICE VISIT (OUTPATIENT)
Dept: ORTHOPEDIC SURGERY | Age: 84
End: 2024-12-04

## 2024-12-04 DIAGNOSIS — Z09 SURGERY FOLLOW-UP: Primary | ICD-10-CM

## 2024-12-04 DIAGNOSIS — Z96.642 H/O TOTAL HIP ARTHROPLASTY, LEFT: ICD-10-CM

## 2024-12-04 NOTE — PROGRESS NOTES
Name: Dior Martins  YOB: 1940  Gender: female  MRN: [id number]    CC: Follow-up left total of arthroplasty    HPI: Dior Martins is a 84 y.o. female who presents about 2 and half month status post left total hip arthroplasty she does continue to work at [employer] and is not sure that she is ready to go back.  She does feel that she is doing better and is ambulating now with a lot more confidence with the use of a cane.  She states that her pain has improved since about mid November but she still has not yet confident.    History was obtained from patient    ROS/Meds/PSH/PMH/FH/SH: I personally reviewed the patients standard intake form.  Below are the pertinents    Tobacco:  reports that she quit smoking about 61 years ago. Her smoking use included cigarettes. She started smoking about 62 years ago. She has a 0.2 pack-year smoking history. She has never been exposed to tobacco smoke. She has never used smokeless tobacco.  Past Medical History:   Diagnosis Date    Arthritis     Asthma exacerbation     hospitalization 2004    Bilateral arm fractures 2013    Cancer (Formerly [facility])     pt denies    GERD (gastroesophageal reflux disease)     Hiatal hernia     History of echocardiogram 08/17/2023      Left Ventricle: Normal left ventricular systolic function with a visually estimated EF of 55 - 60%. Left ventricle size is normal. Normal wall thickness. Normal wall motion.   Mitral Valve: Mild to moderate regurgitation (RV ~23cc, ERO ~0.13).   Left Atrium: Left atrium is severely dilated.    Menopause     Moderate persistent asthma     Morbid obesity     New onset a-fib ([facility]) 08/16/2023    Non-seasonal allergic rhinitis due to other allergic trigger     Secondary hypercoagulable state (Formerly [facility])       Past Surgical History:   Procedure Laterality Date    COLONOSCOPY      ENDOSCOPY, COLON, DIAGNOSTIC      HYSTERECTOMY (CERVIX STATUS UNKNOWN)  1960's    JOINT REPLACEMENT Right     ORTHOPEDIC SURGERY Right

## 2024-12-06 ENCOUNTER — CLINICAL DOCUMENTATION (OUTPATIENT)
Dept: ORTHOPEDIC SURGERY | Age: 84
End: 2024-12-06

## 2024-12-09 ENCOUNTER — CLINICAL DOCUMENTATION (OUTPATIENT)
Dept: ORTHOPEDIC SURGERY | Age: 84
End: 2024-12-09

## 2024-12-09 NOTE — PROGRESS NOTES
Received 2nd [employer] form connected to disability, sent for payment and completion.

## 2024-12-17 ENCOUNTER — OFFICE VISIT (OUTPATIENT)
Age: 84
End: 2024-12-17
Payer: COMMERCIAL

## 2024-12-17 VITALS
SYSTOLIC BLOOD PRESSURE: 128 MMHG | HEART RATE: 108 BPM | HEIGHT: 65 IN | WEIGHT: 215 LBS | BODY MASS INDEX: 35.82 KG/M2 | DIASTOLIC BLOOD PRESSURE: 82 MMHG

## 2024-12-17 DIAGNOSIS — D68.69 SECONDARY HYPERCOAGULABLE STATE (HCC): ICD-10-CM

## 2024-12-17 DIAGNOSIS — I48.91 NEW ONSET A-FIB (HCC): Primary | ICD-10-CM

## 2024-12-17 PROCEDURE — G8482 FLU IMMUNIZE ORDER/ADMIN: HCPCS | Performed by: INTERNAL MEDICINE

## 2024-12-17 PROCEDURE — 99214 OFFICE O/P EST MOD 30 MIN: CPT | Performed by: INTERNAL MEDICINE

## 2024-12-17 PROCEDURE — G8417 CALC BMI ABV UP PARAM F/U: HCPCS | Performed by: INTERNAL MEDICINE

## 2024-12-17 PROCEDURE — 1123F ACP DISCUSS/DSCN MKR DOCD: CPT | Performed by: INTERNAL MEDICINE

## 2024-12-17 PROCEDURE — 1090F PRES/ABSN URINE INCON ASSESS: CPT | Performed by: INTERNAL MEDICINE

## 2024-12-17 PROCEDURE — 1036F TOBACCO NON-USER: CPT | Performed by: INTERNAL MEDICINE

## 2024-12-17 PROCEDURE — G8427 DOCREV CUR MEDS BY ELIG CLIN: HCPCS | Performed by: INTERNAL MEDICINE

## 2024-12-17 PROCEDURE — G8400 PT W/DXA NO RESULTS DOC: HCPCS | Performed by: INTERNAL MEDICINE

## 2024-12-17 ASSESSMENT — ENCOUNTER SYMPTOMS
ABDOMINAL PAIN: 0
SHORTNESS OF BREATH: 0

## 2024-12-17 NOTE — PROGRESS NOTES
[facility] CARDIOLOGY  [address] [place] [address], [address]  [place] [address]  PHONE: [phone]      24    NAME:  Dior Martins  : 1940  MRN: [id number]         SUBJECTIVE:   Dior Martins is a 84 y.o. female seen for a follow up visit regarding the following:     Chief Complaint   Patient presents with    6 Month Follow-Up    Atrial Fibrillation            HPI:  Follow up  6 Month Follow-Up and Atrial Fibrillation   .    Ms. Martins presents today for follow-up.  She doing well and has no complaints today.  In regards to her atrial fibrillation, she has had no palpitations or heart racing.  No shortness of breath or chest pain.  She is continued on Eliquis and said no bleeding issues.  She also on diltiazem for rate control.  She has not had any documented atrial fibrillation since her cardioversion earlier this year.    Atrial Fibrillation  Symptoms are negative for shortness of breath.           Cardiac Medications       Calcium Channel Blockers       dilTIAZem (CARDIZEM CD) 240 MG extended release capsule Take 1 capsule by mouth daily       Salicylates       aspirin 81 MG EC tablet Take 1 tablet by mouth 2 times daily     Patient not taking: Reported on 2024       Direct Factor Xa Inhibitors       apixaban (ELIQUIS) 5 MG TABS tablet Take 1 tablet by mouth 2 times daily                  Past Medical History, Past Surgical History, Family history, Social History, and Medications were all reviewed with the patient today and updated as necessary.     Prior to Admission medications    Medication Sig Start Date End Date Taking? Authorizing Provider   Multiple Vitamins-Minerals (ICAPS AREDS 2 PO) Take by mouth   Yes Provider, MD Sydnee   zafirlukast (ACCOLATE) 20 MG tablet Take 1 tablet by mouth 2 times daily TAKE ONE TABLET BY MOUTH TWO TIMES A DAY 24  Yes Golden Hunter MD   dilTIAZem (CARDIZEM CD) 240 MG extended release capsule Take 1 capsule by mouth daily 24

## 2024-12-20 ENCOUNTER — CLINICAL DOCUMENTATION (OUTPATIENT)
Dept: ORTHOPEDIC SURGERY | Age: 84
End: 2024-12-20

## 2024-12-23 ENCOUNTER — TELEPHONE (OUTPATIENT)
Dept: PULMONOLOGY | Age: 84
End: 2024-12-23

## 2024-12-23 DIAGNOSIS — R06.02 SHORTNESS OF BREATH: ICD-10-CM

## 2024-12-23 DIAGNOSIS — J30.89 NON-SEASONAL ALLERGIC RHINITIS DUE TO OTHER ALLERGIC TRIGGER: ICD-10-CM

## 2024-12-23 DIAGNOSIS — J45.40 MODERATE PERSISTENT ASTHMA WITHOUT COMPLICATION: Primary | ICD-10-CM

## 2024-12-23 RX ORDER — ALBUTEROL SULFATE 0.83 MG/ML
SOLUTION RESPIRATORY (INHALATION)
Qty: 360 ML | Refills: 3 | Status: SHIPPED | OUTPATIENT
Start: 2024-12-23

## 2024-12-23 NOTE — TELEPHONE ENCOUNTER
Patient Refill Request     Last Office Visit: 01/11/2024     When they were supposed to follow up: prn     Upcoming Office Visit: prn     Refills Requested: albuterol sol'n     Type of refill: 90 days     Requested Pharmacy: [facility] [id number]     Is prescription pended?  YES

## 2025-02-03 ENCOUNTER — TELEPHONE (OUTPATIENT)
Dept: ORTHOPEDIC SURGERY | Age: 85
End: 2025-02-03

## 2025-02-03 NOTE — TELEPHONE ENCOUNTER
Patient left a msg that someone had called her but they were talking to fast for her to get all the information and asking for them to call her back.

## 2025-02-03 NOTE — TELEPHONE ENCOUNTER
Patient called and asked about her back to work date.  She says that her paperwork has her going back to work on 2/24/25 but her appointment with Dr. De Oliveira was moved out to 2/12/25 and she wants to know if she waits until after she sees Dr. De Oliveira to go back to work and asks for a return call please.

## 2025-02-12 ENCOUNTER — OFFICE VISIT (OUTPATIENT)
Dept: ORTHOPEDIC SURGERY | Age: 85
End: 2025-02-12
Payer: MEDICARE

## 2025-02-12 DIAGNOSIS — Z96.642 H/O TOTAL HIP ARTHROPLASTY, LEFT: Primary | ICD-10-CM

## 2025-02-12 DIAGNOSIS — Z09 FOLLOW-UP EXAMINATION: ICD-10-CM

## 2025-02-12 PROCEDURE — 99213 OFFICE O/P EST LOW 20 MIN: CPT | Performed by: PHYSICIAN ASSISTANT

## 2025-02-12 PROCEDURE — 1123F ACP DISCUSS/DSCN MKR DOCD: CPT | Performed by: PHYSICIAN ASSISTANT

## 2025-02-12 NOTE — PROGRESS NOTES
Name: Dior Martins  YOB: 1940  Gender: female  MRN: [id number]    Post-Op LEFT MILVIA: 6 months, return to work    The patient returns now 6 months s/p LEFT MILVIA.  They are doing well.  The patient states they can ambulate for unlimited distances.  The patient never uses a cane or assistive devices.  The patient is able to climb stairs normally with rail. The patient uses pain medicines rarely. The patient has had no significant complications since they were last seen.  She is anxious to get back to work, working at the hospital.    Past Medical History:    Allergies:  Allergies   Allergen Reactions    Azithromycin Hives    Codeine Hives    Oxycodone Hallucinations    Sulfa Antibiotics Rash       Medications:  Current Outpatient Medications   Medication Sig    albuterol (PROVENTIL) (2.5 MG/3ML) 0.083% nebulizer solution 1 vial via nebulizer qid prn Dx - asthma J45.909.. Patient will call when refills are needed    Multiple Vitamins-Minerals (ICAPS AREDS 2 PO) Take by mouth    zafirlukast (ACCOLATE) 20 MG tablet Take 1 tablet by mouth 2 times daily TAKE ONE TABLET BY MOUTH TWO TIMES A DAY    dilTIAZem (CARDIZEM CD) 240 MG extended release capsule Take 1 capsule by mouth daily    apixaban (ELIQUIS) 5 MG TABS tablet Take 1 tablet by mouth 2 times daily    ondansetron (ZOFRAN) 4 MG tablet Take 1 tablet by mouth every 6 hours as needed for Nausea or Vomiting (Patient not taking: Reported on 12/17/2024)    aspirin 81 MG EC tablet Take 1 tablet by mouth 2 times daily (Patient not taking: Reported on 12/17/2024)    albuterol sulfate HFA (PROVENTIL;VENTOLIN;PROAIR) 108 (90 Base) MCG/ACT inhaler 2 puffs 4 times daily if needed for shortness of breath or wheezing    fluticasone-salmeterol (ADVAIR DISKUS) 250-50 MCG/ACT AEPB diskus inhaler 1 inhalation twice daily, rinse mouth after use (Patient taking differently: Inhale 1 puff into the lungs 2 times daily 1 inhalation twice daily, rinse mouth after use)

## 2025-02-14 ENCOUNTER — CLINICAL DOCUMENTATION (OUTPATIENT)
Dept: ORTHOPEDIC SURGERY | Age: 85
End: 2025-02-14

## 2025-02-14 ENCOUNTER — TELEPHONE (OUTPATIENT)
Dept: PULMONOLOGY | Age: 85
End: 2025-02-14

## 2025-02-14 NOTE — TELEPHONE ENCOUNTER
Patient called refill ine requesting a refill on Albuterol HFA and Advair; pharmacy is on file [facility].

## 2025-02-17 RX ORDER — ALBUTEROL SULFATE 90 UG/1
INHALANT RESPIRATORY (INHALATION)
Qty: 3 EACH | Refills: 0 | Status: SHIPPED | OUTPATIENT
Start: 2025-02-17

## 2025-02-17 RX ORDER — FLUTICASONE PROPIONATE AND SALMETEROL 250; 50 UG/1; UG/1
POWDER RESPIRATORY (INHALATION)
Qty: 3 EACH | Refills: 0 | Status: SHIPPED | OUTPATIENT
Start: 2025-02-17

## 2025-05-22 ENCOUNTER — OFFICE VISIT (OUTPATIENT)
Dept: FAMILY MEDICINE CLINIC | Facility: CLINIC | Age: 85
End: 2025-05-22

## 2025-05-22 VITALS
SYSTOLIC BLOOD PRESSURE: 156 MMHG | BODY MASS INDEX: 34.82 KG/M2 | OXYGEN SATURATION: 97 % | RESPIRATION RATE: 20 BRPM | WEIGHT: 209 LBS | HEIGHT: 65 IN | TEMPERATURE: 98.4 F | DIASTOLIC BLOOD PRESSURE: 94 MMHG | HEART RATE: 84 BPM

## 2025-05-22 DIAGNOSIS — H93.12 TINNITUS OF LEFT EAR: ICD-10-CM

## 2025-05-22 DIAGNOSIS — H92.02 LEFT EAR PAIN: Primary | ICD-10-CM

## 2025-05-22 PROCEDURE — 1123F ACP DISCUSS/DSCN MKR DOCD: CPT | Performed by: PHYSICIAN ASSISTANT

## 2025-05-22 PROCEDURE — 99212 OFFICE O/P EST SF 10 MIN: CPT | Performed by: PHYSICIAN ASSISTANT

## 2025-05-22 RX ORDER — LORATADINE 10 MG/1
5 TABLET ORAL DAILY PRN
Qty: 10 TABLET | Refills: 0 | Status: SHIPPED | OUTPATIENT
Start: 2025-05-22 | End: 2025-06-21

## 2025-05-22 RX ORDER — FLUTICASONE PROPIONATE 50 MCG
2 SPRAY, SUSPENSION (ML) NASAL DAILY
Qty: 32 G | Refills: 0 | Status: SHIPPED | OUTPATIENT
Start: 2025-05-22

## 2025-05-22 ASSESSMENT — PATIENT HEALTH QUESTIONNAIRE - PHQ9
SUM OF ALL RESPONSES TO PHQ QUESTIONS 1-9: 0
2. FEELING DOWN, DEPRESSED OR HOPELESS: NOT AT ALL
1. LITTLE INTEREST OR PLEASURE IN DOING THINGS: NOT AT ALL
SUM OF ALL RESPONSES TO PHQ QUESTIONS 1-9: 0

## 2025-05-22 NOTE — PROGRESS NOTES
Delmis Francis PA-C, [facility], MPH  [facility]  [address], [address]  [place] [address]  Phone [phone]    SUBJECTIVE  Chief Complaint   Patient presents with    Ear Pain     Left x 2days     HPI   Dior Martins is a 84 y.o. female with PMH as below presents for an acute visit.     Pt complains of left ear pain that started yesterday. States that when she stood up, she felt \"wobbly,\" which lasted for a second and then went away. States she took a tylenol and laid down and when she got up it was feeling better. However, this morning she noticed that it was hurting again. It feels like her ear is stopped up. Denies any discharge from her ear canal. Denies fever, chills, dizziness. States her balance is at baseline. States she has had \"roaring\" in her ear all day today which is a new problem. Denies pulsatile tinnitus. Hearing is normal. Denies vertigo. States it feels like it does when she is in an airplane.    Review of Systems     Patient Active Problem List   Diagnosis    Gastro-esophageal reflux disease without esophagitis    CHANELLE (obstructive sleep apnea)    S/P total hip arthroplasty    Moderate persistent asthma without complication    Non-seasonal allergic rhinitis    Severe obesity (BMI 35.0-39.9) with comorbidity (HCC)    Primary osteoarthritis involving multiple joints    Asthma    New onset a-fib (HCC)    Secondary hypercoagulable state    Primary osteoarthritis of left hip    Atrial fibrillation (HCC)      Social History     Tobacco Use    Smoking status: Former     Current packs/day: 0.00     Average packs/day: 0.2 packs/day for 1 year (0.2 ttl pk-yrs)     Types: Cigarettes     Start date:      Quit date:      Years since quittin.4     Passive exposure: Never    Smokeless tobacco: Never   Substance Use Topics    Alcohol use: No      PHQ-9 Total Score: 0 (2025  2:42 PM)      OBJECTIVE  Vitals:    25 1442   BP: (!) 156/94   Pulse: 84

## 2025-06-09 ENCOUNTER — OFFICE VISIT (OUTPATIENT)
Age: 85
End: 2025-06-09

## 2025-06-09 VITALS
WEIGHT: 211 LBS | HEIGHT: 65 IN | BODY MASS INDEX: 35.16 KG/M2 | SYSTOLIC BLOOD PRESSURE: 156 MMHG | HEART RATE: 96 BPM | DIASTOLIC BLOOD PRESSURE: 72 MMHG

## 2025-06-09 DIAGNOSIS — I48.21 PERMANENT ATRIAL FIBRILLATION (HCC): Primary | ICD-10-CM

## 2025-06-09 DIAGNOSIS — D68.69 SECONDARY HYPERCOAGULABLE STATE: ICD-10-CM

## 2025-06-09 PROCEDURE — 99214 OFFICE O/P EST MOD 30 MIN: CPT | Performed by: INTERNAL MEDICINE

## 2025-06-09 PROCEDURE — 1123F ACP DISCUSS/DSCN MKR DOCD: CPT | Performed by: INTERNAL MEDICINE

## 2025-06-09 RX ORDER — PHENOL 1.4 %
1 AEROSOL, SPRAY (ML) MUCOUS MEMBRANE DAILY
COMMUNITY

## 2025-06-09 ASSESSMENT — ENCOUNTER SYMPTOMS
ABDOMINAL PAIN: 0
SHORTNESS OF BREATH: 0

## 2025-06-09 NOTE — PROGRESS NOTES
[facility] CARDIOLOGY  [address] [place] [address], [address]  [place] [address]  PHONE: [phone]      25    NAME:  Dior Martins  : 1940  MRN: [id number]         SUBJECTIVE:   Dior Martins is a 84 y.o. female seen for a follow up visit regarding the following:     Chief Complaint   Patient presents with    Atrial Fibrillation            HPI:  Follow up  Atrial Fibrillation   .    Ms. Martins presents today for follow-up.  She doing well and has no complaints today.  In regards to her atrial fibrillation, she has had no palpitations or heart racing.  No shortness of breath or chest pain.  She is continued on Eliquis and said no bleeding issues.  She also on diltiazem for rate control.  She has not had any documented atrial fibrillation since her cardioversion earlier this year.    Atrial Fibrillation  Symptoms are negative for shortness of breath.           Cardiac Medications       Calcium Channel Blockers       dilTIAZem (CARDIZEM CD) 240 MG extended release capsule Take 1 capsule by mouth daily       Salicylates       aspirin 81 MG EC tablet Take 1 tablet by mouth 2 times daily     Patient not taking: Reported on 2025       Direct Factor Xa Inhibitors       apixaban (ELIQUIS) 5 MG TABS tablet Take 1 tablet by mouth 2 times daily                  Past Medical History, Past Surgical History, Family history, Social History, and Medications were all reviewed with the patient today and updated as necessary.     Prior to Admission medications    Medication Sig Start Date End Date Taking? Authorizing Provider   calcium carbonate 600 MG TABS tablet Take 1 tablet by mouth daily   Yes Provider, MD Sydnee   fluticasone (FLONASE) 50 MCG/ACT nasal spray 2 sprays by Each Nostril route daily  Patient taking differently: 2 sprays by Each Nostril route daily PRN 25  Yes Delmis Francis PA   fluticasone-salmeterol (ADVAIR DISKUS) 250-50 MCG/ACT AEPB diskus inhaler 1 inhalation twice

## 2025-06-23 ENCOUNTER — OFFICE VISIT (OUTPATIENT)
Dept: FAMILY MEDICINE CLINIC | Facility: CLINIC | Age: 85
End: 2025-06-23

## 2025-06-23 VITALS
SYSTOLIC BLOOD PRESSURE: 138 MMHG | DIASTOLIC BLOOD PRESSURE: 78 MMHG | TEMPERATURE: 97.9 F | HEIGHT: 65 IN | WEIGHT: 208.8 LBS | BODY MASS INDEX: 34.79 KG/M2 | RESPIRATION RATE: 18 BRPM | HEART RATE: 72 BPM | OXYGEN SATURATION: 99 %

## 2025-06-23 DIAGNOSIS — K21.9 GASTRO-ESOPHAGEAL REFLUX DISEASE WITHOUT ESOPHAGITIS: ICD-10-CM

## 2025-06-23 DIAGNOSIS — I48.21 PERMANENT ATRIAL FIBRILLATION (HCC): ICD-10-CM

## 2025-06-23 DIAGNOSIS — J45.40 MODERATE PERSISTENT ASTHMA WITHOUT COMPLICATION: ICD-10-CM

## 2025-06-23 DIAGNOSIS — I48.21 PERMANENT ATRIAL FIBRILLATION (HCC): Primary | ICD-10-CM

## 2025-06-23 LAB
ALBUMIN SERPL-MCNC: 3 G/DL (ref 3.2–4.6)
ALBUMIN/GLOB SERPL: 0.9 (ref 1–1.9)
ALP SERPL-CCNC: 127 U/L (ref 35–104)
ALT SERPL-CCNC: 8 U/L (ref 8–45)
ANION GAP SERPL CALC-SCNC: 11 MMOL/L (ref 7–16)
AST SERPL-CCNC: 14 U/L (ref 15–37)
BASOPHILS # BLD: 0.04 K/UL (ref 0–0.2)
BASOPHILS NFR BLD: 0.7 % (ref 0–2)
BILIRUB SERPL-MCNC: 0.3 MG/DL (ref 0–1.2)
BUN SERPL-MCNC: 11 MG/DL (ref 8–23)
CALCIUM SERPL-MCNC: 9.4 MG/DL (ref 8.8–10.2)
CHLORIDE SERPL-SCNC: 103 MMOL/L (ref 98–107)
CHOLEST SERPL-MCNC: 193 MG/DL (ref 0–200)
CO2 SERPL-SCNC: 26 MMOL/L (ref 20–29)
CREAT SERPL-MCNC: 0.72 MG/DL (ref 0.6–1.1)
DIFFERENTIAL METHOD BLD: NORMAL
EOSINOPHIL # BLD: 0.22 K/UL (ref 0–0.8)
EOSINOPHIL NFR BLD: 3.6 % (ref 0.5–7.8)
ERYTHROCYTE [DISTWIDTH] IN BLOOD BY AUTOMATED COUNT: 14.6 % (ref 11.9–14.6)
GLOBULIN SER CALC-MCNC: 3.4 G/DL (ref 2.3–3.5)
GLUCOSE SERPL-MCNC: 91 MG/DL (ref 70–99)
HCT VFR BLD AUTO: 37.8 % (ref 35.8–46.3)
HDLC SERPL-MCNC: 55 MG/DL (ref 40–60)
HDLC SERPL: 3.5 (ref 0–5)
HGB BLD-MCNC: 12 G/DL (ref 11.7–15.4)
IMM GRANULOCYTES # BLD AUTO: 0.01 K/UL (ref 0–0.5)
IMM GRANULOCYTES NFR BLD AUTO: 0.2 % (ref 0–5)
LDLC SERPL CALC-MCNC: 120 MG/DL (ref 0–100)
LYMPHOCYTES # BLD: 2 K/UL (ref 0.5–4.6)
LYMPHOCYTES NFR BLD: 32.9 % (ref 13–44)
MCH RBC QN AUTO: 28.4 PG (ref 26.1–32.9)
MCHC RBC AUTO-ENTMCNC: 31.7 G/DL (ref 31.4–35)
MCV RBC AUTO: 89.4 FL (ref 82–102)
MONOCYTES # BLD: 0.57 K/UL (ref 0.1–1.3)
MONOCYTES NFR BLD: 9.4 % (ref 4–12)
NEUTS SEG # BLD: 3.24 K/UL (ref 1.7–8.2)
NEUTS SEG NFR BLD: 53.2 % (ref 43–78)
NRBC # BLD: 0 K/UL (ref 0–0.2)
PLATELET # BLD AUTO: 323 K/UL (ref 150–450)
PMV BLD AUTO: 9.5 FL (ref 9.4–12.3)
POTASSIUM SERPL-SCNC: 4.9 MMOL/L (ref 3.5–5.1)
PROT SERPL-MCNC: 6.4 G/DL (ref 6.3–8.2)
RBC # BLD AUTO: 4.23 M/UL (ref 4.05–5.2)
SODIUM SERPL-SCNC: 140 MMOL/L (ref 136–145)
TRIGL SERPL-MCNC: 91 MG/DL (ref 0–150)
TSH W FREE THYROID IF ABNORMAL: 1.13 UIU/ML (ref 0.27–4.2)
VLDLC SERPL CALC-MCNC: 18 MG/DL (ref 6–23)
WBC # BLD AUTO: 6.1 K/UL (ref 4.3–11.1)

## 2025-06-23 PROCEDURE — 99214 OFFICE O/P EST MOD 30 MIN: CPT | Performed by: FAMILY MEDICINE

## 2025-06-23 PROCEDURE — 1123F ACP DISCUSS/DSCN MKR DOCD: CPT | Performed by: FAMILY MEDICINE

## 2025-06-23 NOTE — PROGRESS NOTES
Follow-up on kidney and liver function and blood counts ordered.  - Bruising attributed to Eliquis.  - Continue monitoring for any new symptoms or concerns.  - Call if any issues arise.    Follow-up  - Follow-up 6 months      Diagnoses and orders for visit:  1. Permanent atrial fibrillation (HCC)  -     CBC with Auto Differential; Future  -     Comprehensive Metabolic Panel; Future  -     TSH reflex to FT4; Future  -     Lipid Panel; Future  2. Moderate persistent asthma without complication  3. Gastro-esophageal reflux disease without esophagitis            The patient (or guardian, if applicable) and other individuals in attendance with the patient were advised that Artificial Intelligence will be utilized during this visit to record, process the conversation to generate a clinical note, and support improvement of the AI technology. The patient (or guardian, if applicable) and other individuals in attendance at the appointment consented to the use of AI, including the recording.          Patient informed, we will call with blood work results within one week.  If you have not heard regarding results in over a week, please contact office.  You can also review results on [employer].           Golden Hunter MD

## 2025-06-24 ENCOUNTER — RESULTS FOLLOW-UP (OUTPATIENT)
Dept: FAMILY MEDICINE CLINIC | Facility: CLINIC | Age: 85
End: 2025-06-24

## 2025-06-24 ASSESSMENT — ENCOUNTER SYMPTOMS
ABDOMINAL PAIN: 0
CHEST TIGHTNESS: 0
WHEEZING: 0
SHORTNESS OF BREATH: 0

## 2025-08-22 ENCOUNTER — TELEPHONE (OUTPATIENT)
Dept: PULMONOLOGY | Age: 85
End: 2025-08-22

## 2025-08-22 RX ORDER — FLUTICASONE PROPIONATE AND SALMETEROL 250; 50 UG/1; UG/1
POWDER RESPIRATORY (INHALATION)
Qty: 3 EACH | Refills: 0 | Status: SHIPPED | OUTPATIENT
Start: 2025-08-22

## 2025-08-22 RX ORDER — ALBUTEROL SULFATE 90 UG/1
INHALANT RESPIRATORY (INHALATION)
Qty: 3 EACH | Refills: 0 | Status: SHIPPED | OUTPATIENT
Start: 2025-08-22

## (undated) DEVICE — HEWSON SUTURE RETRIEVER: Brand: HEWSON SUTURE RETRIEVER

## (undated) DEVICE — NEEDLE SPNL 22GA L3.5IN BLK HUB S STL REG WALL FIT STYL W/

## (undated) DEVICE — 1840 FOAM BLOCK NEEDLE COUNTER: Brand: DEVON

## (undated) DEVICE — SOLUTION IV 250ML 0.9% SOD CHL PH 5 INJ USP VIAFLX PLAS

## (undated) DEVICE — SUTURE VICRYL SZ 1 L36IN ABSRB UD CTX L48MM 1/2 CIR J977H

## (undated) DEVICE — DRAPE,HIP,W/POUCHES,STERILE: Brand: MEDLINE

## (undated) DEVICE — 2000CC GUARDIAN II: Brand: GUARDIAN

## (undated) DEVICE — SYRINGE MED 50ML LUERLOCK TIP

## (undated) DEVICE — SUTURE VCRL SZ 1 L27IN ABSRB UD L36MM CP-1 1/2 CIR REV CUT J268H

## (undated) DEVICE — TOTAL HIP DR RIDGEWAY: Brand: MEDLINE INDUSTRIES, INC.

## (undated) DEVICE — SUTURE VICRYL SZ 1 L27IN ABSRB UD L36MM CP-1 1/2 CIR REV CUT J268H

## (undated) DEVICE — FOAM BUMP, LARGE: Brand: MEDLINE INDUSTRIES, INC.

## (undated) DEVICE — 3M™ IOBAN™ 2 ANTIMICROBIAL INCISE DRAPE 6651EZ: Brand: IOBAN™ 2

## (undated) DEVICE — SUTURE PDS II SZ 1 L54IN ABSRB VLT L65MM TP-1 1/2 CIR Z879G

## (undated) DEVICE — CLOSURE SKIN FACILITATES COMPATIBILITY W/ CERTAIN IS DSG

## (undated) DEVICE — SOLUTION IRRIG 1000ML 0.9% SOD CHL USP POUR PLAS BTL

## (undated) DEVICE — SOLUTION IRRIG 1000ML H2O STRL BLT

## (undated) DEVICE — TRAY PREP DRY W/ PREM GLV 2 APPL 6 SPNG 2 UNDPD 1 OVERWRAP

## (undated) DEVICE — SYR LR LCK 1ML GRAD NSAF 30ML --

## (undated) DEVICE — SUTURE VCRL SZ 2-0 L27IN ABSRB UD L36MM CP-1 1/2 CIR REV J266H

## (undated) DEVICE — TRAY CATH 16F DRN BG LTX -- CONVERT TO ITEM 363158

## (undated) DEVICE — ZIP 16 SURGICAL SKIN CLOSURE DEVICE: Brand: ZIP 16 SURGICAL SKIN CLOSURE DEVICE

## (undated) DEVICE — MEDI-VAC YANKAUER SUCTION HANDLE W/BULBOUS TIP: Brand: CARDINAL HEALTH

## (undated) DEVICE — 3000CC GUARDIAN II: Brand: GUARDIAN

## (undated) DEVICE — BIPOLAR SEALER 23-112-1 AQM 6.0: Brand: AQUAMANTYS ®

## (undated) DEVICE — T4 HOOD

## (undated) DEVICE — YANKAUER,FLEXIBLE HANDLE,REGLR CAPACITY: Brand: MEDLINE INDUSTRIES, INC.

## (undated) DEVICE — Z DISCONTINUED USE 2744636  DRESSING AQUACEL 14 IN ALG W3.5XL14IN POLYUR FLM CVR W/ HYDRCOLL

## (undated) DEVICE — SOLUTION IRRIG 3000ML 0.9% SOD CHL USP UROMATIC PLAS CONT

## (undated) DEVICE — 3M™ COBAN™ NL STERILE NON-LATEX SELF-ADHERENT WRAP, 2084S, 4 IN X 5 YD (10 CM X 4,5 M), 18 ROLLS/CASE: Brand: 3M™ COBAN™

## (undated) DEVICE — FAN SPRAY KIT: Brand: PULSAVAC®

## (undated) DEVICE — NEEDLE HYPO 18GA L1.5IN PNK S STL HUB POLYPR SHLD REG BVL

## (undated) DEVICE — SKIN STAPLER: Brand: SIGNET

## (undated) DEVICE — SYR 50ML LR LCK 1ML GRAD NSAF --

## (undated) DEVICE — REM POLYHESIVE ADULT PATIENT RETURN ELECTRODE: Brand: VALLEYLAB

## (undated) DEVICE — HOOD: Brand: T7PLUS

## (undated) DEVICE — (D)PREP SKN CHLRAPRP APPL 26ML -- CONVERT TO ITEM 371833

## (undated) DEVICE — SOLUTION IRRIG 1000ML STRL H2O USP PLAS POUR BTL

## (undated) DEVICE — SUTURE VICRYL + SZ 2-0 L27IN ABSRB UD CP-1 1/2 CIR REV CUT VCP266H

## (undated) DEVICE — SUTURE VCRL SZ 1 L36IN ABSRB UD CTX L48MM 1/2 CIR J977H

## (undated) DEVICE — SUTURE PDS II SZ 1 L96IN ABSRB VLT TP-1 L65MM 1/2 CIR Z880G

## (undated) DEVICE — SOLUTION IRRIG 3000ML 0.9% SOD CHL FLX CONT 0797208] ICU MEDICAL INC]

## (undated) DEVICE — STERILE PVP: Brand: MEDLINE INDUSTRIES, INC.

## (undated) DEVICE — COVER,MAYO STAND,XL,STERILE: Brand: MEDLINE

## (undated) DEVICE — SOLUTION IV 1000ML 0.9% SOD CHL

## (undated) DEVICE — SUTURE ETHIBOND EXCEL SZ 5 L30IN NONABSORBABLE GRN L40MM V-37 MB66G

## (undated) DEVICE — DRAPE,TOP,102X53,STERILE: Brand: MEDLINE

## (undated) DEVICE — MCLASS OSCILLATING SAW BLADE 19 X 1.27 (0.050") X 90 MM: Brand: MCLASS

## (undated) DEVICE — SUTURE ETHBND EXCEL SZ 5 L30IN NONABSORBABLE GRN L40MM V-37 MB66G

## (undated) DEVICE — BUTTON SWITCH PENCIL BLADE ELECTRODE, HOLSTER: Brand: EDGE

## (undated) DEVICE — STRYKER PERFORMANCE SERIES SAGITTAL BLADE: Brand: STRYKER PERFORMANCE SERIES

## (undated) DEVICE — SURGICAL PROCEDURE PACK TOT HIP CDS

## (undated) DEVICE — BASIN SET W LT HANDLES: Brand: CARDINAL HEALTH

## (undated) DEVICE — SUTURE STRATAFIX SYMMETRIC PDS + SZ 1 L18IN ABSRB VLT L48MM SXPP1A400